# Patient Record
Sex: FEMALE | Race: OTHER | Employment: UNEMPLOYED | URBAN - METROPOLITAN AREA
[De-identification: names, ages, dates, MRNs, and addresses within clinical notes are randomized per-mention and may not be internally consistent; named-entity substitution may affect disease eponyms.]

---

## 2018-10-10 ENCOUNTER — HOSPITAL ENCOUNTER (EMERGENCY)
Age: 33
Discharge: HOME | End: 2018-10-11
Attending: EMERGENCY MEDICINE | Admitting: EMERGENCY MEDICINE
Payer: SELF-PAY

## 2018-10-10 ENCOUNTER — APPOINTMENT (OUTPATIENT)
Dept: ULTRASOUND IMAGING | Age: 33
End: 2018-10-10
Attending: NURSE PRACTITIONER
Payer: SELF-PAY

## 2018-10-10 DIAGNOSIS — Z32.01 PREGNANCY TEST PERFORMED, PREGNANCY CONFIRMED: Primary | ICD-10-CM

## 2018-10-10 DIAGNOSIS — R10.84 ABDOMINAL PAIN, GENERALIZED: ICD-10-CM

## 2018-10-10 LAB
ALBUMIN SERPL-MCNC: 3.4 G/DL (ref 3.5–5)
ALBUMIN/GLOB SERPL: 0.9 {RATIO} (ref 1.1–2.2)
ALP SERPL-CCNC: 61 U/L (ref 45–117)
ALT SERPL-CCNC: 19 U/L (ref 12–78)
ANION GAP SERPL CALC-SCNC: 10 MMOL/L (ref 5–15)
APPEARANCE UR: CLEAR
AST SERPL-CCNC: 7 U/L (ref 15–37)
BACTERIA URNS QL MICRO: NEGATIVE /HPF
BASOPHILS # BLD: 0 K/UL (ref 0–0.1)
BASOPHILS NFR BLD: 0 % (ref 0–1)
BILIRUB SERPL-MCNC: 0.3 MG/DL (ref 0.2–1)
BILIRUB UR QL: NEGATIVE
BUN SERPL-MCNC: 10 MG/DL (ref 6–20)
BUN/CREAT SERPL: 20 (ref 12–20)
CALCIUM SERPL-MCNC: 9.4 MG/DL (ref 8.5–10.1)
CHLORIDE SERPL-SCNC: 104 MMOL/L (ref 97–108)
CO2 SERPL-SCNC: 23 MMOL/L (ref 21–32)
COLOR UR: ABNORMAL
CREAT SERPL-MCNC: 0.49 MG/DL (ref 0.55–1.02)
DIFFERENTIAL METHOD BLD: ABNORMAL
EOSINOPHIL # BLD: 0 K/UL (ref 0–0.4)
EOSINOPHIL NFR BLD: 1 % (ref 0–7)
EPITH CASTS URNS QL MICRO: ABNORMAL /LPF
ERYTHROCYTE [DISTWIDTH] IN BLOOD BY AUTOMATED COUNT: 13.3 % (ref 11.5–14.5)
GLOBULIN SER CALC-MCNC: 3.9 G/DL (ref 2–4)
GLUCOSE SERPL-MCNC: 117 MG/DL (ref 65–100)
GLUCOSE UR STRIP.AUTO-MCNC: >1000 MG/DL
HCG SERPL-ACNC: ABNORMAL MIU/ML (ref 0–6)
HCT VFR BLD AUTO: 34.8 % (ref 35–47)
HGB BLD-MCNC: 11.7 G/DL (ref 11.5–16)
HGB UR QL STRIP: ABNORMAL
HYALINE CASTS URNS QL MICRO: ABNORMAL /LPF (ref 0–5)
IMM GRANULOCYTES # BLD: 0 K/UL (ref 0–0.04)
IMM GRANULOCYTES NFR BLD AUTO: 0 % (ref 0–0.5)
KETONES UR QL STRIP.AUTO: 15 MG/DL
LEUKOCYTE ESTERASE UR QL STRIP.AUTO: NEGATIVE
LIPASE SERPL-CCNC: 150 U/L (ref 73–393)
LYMPHOCYTES # BLD: 1.6 K/UL (ref 0.8–3.5)
LYMPHOCYTES NFR BLD: 25 % (ref 12–49)
MCH RBC QN AUTO: 27.1 PG (ref 26–34)
MCHC RBC AUTO-ENTMCNC: 33.6 G/DL (ref 30–36.5)
MCV RBC AUTO: 80.6 FL (ref 80–99)
MONOCYTES # BLD: 0.3 K/UL (ref 0–1)
MONOCYTES NFR BLD: 5 % (ref 5–13)
NEUTS SEG # BLD: 4.5 K/UL (ref 1.8–8)
NEUTS SEG NFR BLD: 69 % (ref 32–75)
NITRITE UR QL STRIP.AUTO: NEGATIVE
NRBC # BLD: 0 K/UL (ref 0–0.01)
NRBC BLD-RTO: 0 PER 100 WBC
PH UR STRIP: 5.5 [PH] (ref 5–8)
PLATELET # BLD AUTO: 259 K/UL (ref 150–400)
PMV BLD AUTO: 10.1 FL (ref 8.9–12.9)
POTASSIUM SERPL-SCNC: 3.6 MMOL/L (ref 3.5–5.1)
PROT SERPL-MCNC: 7.3 G/DL (ref 6.4–8.2)
PROT UR STRIP-MCNC: ABNORMAL MG/DL
RBC # BLD AUTO: 4.32 M/UL (ref 3.8–5.2)
RBC #/AREA URNS HPF: ABNORMAL /HPF (ref 0–5)
SODIUM SERPL-SCNC: 137 MMOL/L (ref 136–145)
SP GR UR REFRACTOMETRY: >1.03 (ref 1–1.03)
UROBILINOGEN UR QL STRIP.AUTO: 1 EU/DL (ref 0.2–1)
WBC # BLD AUTO: 6.4 K/UL (ref 3.6–11)
WBC URNS QL MICRO: ABNORMAL /HPF (ref 0–4)

## 2018-10-10 PROCEDURE — 86900 BLOOD TYPING SEROLOGIC ABO: CPT | Performed by: NURSE PRACTITIONER

## 2018-10-10 PROCEDURE — 81001 URINALYSIS AUTO W/SCOPE: CPT | Performed by: NURSE PRACTITIONER

## 2018-10-10 PROCEDURE — 80053 COMPREHEN METABOLIC PANEL: CPT | Performed by: NURSE PRACTITIONER

## 2018-10-10 PROCEDURE — 84702 CHORIONIC GONADOTROPIN TEST: CPT | Performed by: NURSE PRACTITIONER

## 2018-10-10 PROCEDURE — 83690 ASSAY OF LIPASE: CPT | Performed by: NURSE PRACTITIONER

## 2018-10-10 PROCEDURE — 99283 EMERGENCY DEPT VISIT LOW MDM: CPT

## 2018-10-10 PROCEDURE — 76705 ECHO EXAM OF ABDOMEN: CPT

## 2018-10-10 PROCEDURE — 85025 COMPLETE CBC W/AUTO DIFF WBC: CPT | Performed by: NURSE PRACTITIONER

## 2018-10-10 PROCEDURE — 36415 COLL VENOUS BLD VENIPUNCTURE: CPT | Performed by: NURSE PRACTITIONER

## 2018-10-11 ENCOUNTER — APPOINTMENT (OUTPATIENT)
Dept: ULTRASOUND IMAGING | Age: 33
End: 2018-10-11
Attending: NURSE PRACTITIONER
Payer: SELF-PAY

## 2018-10-11 VITALS
DIASTOLIC BLOOD PRESSURE: 80 MMHG | HEART RATE: 94 BPM | BODY MASS INDEX: 30.73 KG/M2 | SYSTOLIC BLOOD PRESSURE: 139 MMHG | OXYGEN SATURATION: 98 % | WEIGHT: 180 LBS | RESPIRATION RATE: 17 BRPM | TEMPERATURE: 98.5 F | HEIGHT: 64 IN

## 2018-10-11 LAB
ABO + RH BLD: NORMAL
BLOOD BANK CMNT PATIENT-IMP: NORMAL

## 2018-10-11 PROCEDURE — 76801 OB US < 14 WKS SINGLE FETUS: CPT

## 2018-10-11 NOTE — ED TRIAGE NOTES
Pt.  is 16 week pregnant,  A0 pt.  has been vomiting non-stop, having right flank pain and abdominal cramping. Denies bleeding or discharge at this time. +fetal movement. Pt.  has not had any pre- care at this time, nor has been able to hold down her vitamins.  does not have a doctor.

## 2018-10-11 NOTE — ED PROVIDER NOTES
HPI Comments: 26 y.o. Female, Lina Vicente and currently 16 weeks pregnant with no significant past medical history, presents ambulatory to the ED accompanied by her , with chief complaint of right flank pain and abdominal pain x 2 days. Patient states that her abdominal pain and right flank pain started yesterday. The abdominal pain is described as diffuse \"cramps\", and she rates her pain in both the abdomen and right flank as a 6/10 in severity. Her pain has been constant since onset. She also reports associated \"burning\" dysuria, occasionally malodorous urine, chills, nausea, and vomiting. Patient notes that her nausea and vomiting have been present since she first found out she was pregnant. She denies fever. Pt reports that she has not had any prenatal care for this current pregnancy due to lack of medical insurance. Now that she has insurance, she plans to make an appointment in the near future. Patient specifically denies any vaginal bleeding or vaginal discharge. There are no other acute medical concerns at this time. PCP: None No past medical history on file. No past surgical history on file. Note written by Elab Knutson. Denita Escamilla, as dictated  by Jose Montes NP 9:42 The history is provided by the patient. No  was used. No past medical history on file. No past surgical history on file. No family history on file. Social History Social History  Marital status:  Spouse name: N/A  
 Number of children: N/A  
 Years of education: N/A Occupational History  Not on file. Social History Main Topics  Smoking status: Not on file  Smokeless tobacco: Not on file  Alcohol use Not on file  Drug use: Not on file  Sexual activity: Not on file Other Topics Concern  Not on file Social History Narrative  No narrative on file ALLERGIES: Review of patient's allergies indicates no known allergies. Review of Systems Constitutional: Positive for chills. Negative for appetite change, diaphoresis, fatigue and fever. HENT: Negative for congestion, ear discharge, ear pain, sinus pain, sinus pressure, sore throat and trouble swallowing. Eyes: Negative for photophobia, pain, redness and visual disturbance. Respiratory: Negative for chest tightness, shortness of breath and wheezing. Cardiovascular: Negative for chest pain and palpitations. Gastrointestinal: Positive for abdominal pain, nausea and vomiting. Negative for abdominal distention and diarrhea. Endocrine: Negative. Genitourinary: Positive for dysuria and flank pain (Right). Negative for difficulty urinating, frequency, hematuria, urgency, vaginal bleeding and vaginal discharge. Musculoskeletal: Negative for back pain, neck pain and neck stiffness. Skin: Negative for color change, pallor, rash and wound. Allergic/Immunologic: Negative. Neurological: Negative for dizziness, speech difficulty, weakness and headaches. Hematological: Does not bruise/bleed easily. Psychiatric/Behavioral: Negative for behavioral problems. The patient is not nervous/anxious. All other systems reviewed and are negative. Vitals:  
 10/10/18 2105 10/11/18 0044 10/11/18 0045 BP: 150/80 139/80 Pulse: 94 Resp: 17 Temp: 98.5 °F (36.9 °C) SpO2: 99%  98% Weight: 81.6 kg (180 lb) Height: 5' 4\" (1.626 m) Physical Exam  
Constitutional: She is oriented to person, place, and time. She appears well-developed and well-nourished. No distress. HENT:  
Head: Normocephalic and atraumatic.   
Right Ear: External ear normal.  
Left Ear: External ear normal.  
Nose: Nose normal.  
Mouth/Throat: Oropharynx is clear and moist.  
Eyes: Conjunctivae and EOM are normal. Pupils are equal, round, and reactive to light. Right eye exhibits no discharge. Left eye exhibits no discharge. No scleral icterus. Neck: Normal range of motion. Neck supple. No JVD present. No tracheal deviation present. No thyromegaly present. Cardiovascular: Normal rate, regular rhythm, normal heart sounds and intact distal pulses. Exam reveals no gallop. No murmur heard. Pulmonary/Chest: Effort normal and breath sounds normal. No respiratory distress. She has no wheezes. She has no rales. She exhibits no tenderness. Abdominal: Soft. Bowel sounds are normal. She exhibits no distension. There is no tenderness. There is no rebound and no guarding. Gravid uterus Genitourinary: No vaginal discharge found. Musculoskeletal: Normal range of motion. She exhibits no edema or tenderness. R lower back ttp Neurological: She is alert and oriented to person, place, and time. Skin: Skin is warm and dry. No rash noted. She is not diaphoretic. No erythema. No pallor. Psychiatric: She has a normal mood and affect. Her behavior is normal. Judgment and thought content normal.  
Nursing note and vitals reviewed. Written by Denita Max, as dictated  by Jose Montes NP 9:42 PM 
 
MDM Number of Diagnoses or Management Options Abdominal pain, generalized: new and requires workup Pregnancy test performed, pregnancy confirmed: new and requires workup Diagnosis management comments: Plan: 
Discharge to home and follow up with PCP. Follow up with OB/GYN Amount and/or Complexity of Data Reviewed Clinical lab tests: ordered and reviewed ED Course 12:42 AM 
Pt has been reexamined. Pt has no new complaints, changes or physical findings. Care plan outlined and precautions discussed. All available results were reviewed with pt. All medications were reviewed with pt. All of pt's questions and concerns were addressed.  Pt agrees to F/U as instructed and agrees to return to ED upon further deterioration. Pt is ready to go home. Yandy Lynch NP Procedures

## 2018-10-11 NOTE — DISCHARGE INSTRUCTIONS
Belly Pain in Pregnancy: Care Instructions  Your Care Instructions  When you're pregnant, any belly pain can be a worry. You may not want to call your doctor about every pain you have. But you don't want to miss something that is dangerous for you or your baby. Even if it feels familiar, belly pain can mean something new when you're pregnant. It's important to know when to call your doctor. It will also help to know how to care for yourself at home when your pain is not caused by anything harmful. · When belly pain is more severe or constant, see a doctor right away. · If you're sure your belly pain is a sign of labor, call your doctor. · When belly pain is brief, it's usually a normal part of pregnancy. It might be related to changes in the growing uterus. Or it could be the stretching of ligaments called round ligaments. These ligaments help support the uterus. Round ligament pain can be on either side of your belly. It can also be felt in your hips or groin. Follow-up care is a key part of your treatment and safety. Be sure to make and go to all appointments, and call your doctor if you are having problems. It's also a good idea to know your test results and keep a list of the medicines you take. How can you tell if belly pain is a sign of labor? When belly pain is caused by labor, it can feel like mild or menstrual-like cramps in your lower belly. These cramps are probably contractions. They can happen in your second or third trimester. You may also have:  · A steady, dull ache in your lower back, pelvis, or thighs. · A feeling of pressure in your pelvis or lower belly. · Changes in your vaginal discharge or a sudden release of fluid from the vagina. If you think you are in labor, call your doctor. How can you care for yourself at home? When belly pain is mild and is not a symptom of labor:  · Rest until you feel better. · Take a warm bath.   · Think about what you drink and eat:  ¨ Drink plenty of fluids. Choose water and other caffeine-free clear liquids until you feel better. ¨ Try eating small, frequent meals. If your stomach is upset, try bland, low-fat foods like plain rice, broiled chicken, toast, and yogurt. · Think about how you move if you are having brief pains from stretching of the round ligaments. ¨ Try gentle stretching. ¨ Move a little more slowly when turning in bed or getting up from a chair, so those ligaments don't stretch quickly. ¨ Lean forward a bit if you think you are going to cough or sneeze. When should you call for help? Call 911 anytime you think you may need emergency care. For example, call if:  · You have sudden, severe pain in your belly. · You have severe vaginal bleeding. Call your doctor now or seek immediate medical care if:  · You have new or worse belly pain or cramping. · You have any vaginal bleeding. · You have a fever. · You have symptoms of preeclampsia, such as:  ¨ Sudden swelling of your face, hands, or feet. ¨ New vision problems (such as dimness or blurring). ¨ A severe headache. · You think that you may be in labor. This means that you've had at least 8 contractions within 1 hour or at least 4 contractions within 20 minutes, even after you change your position and drink fluids. · You have symptoms of a urinary tract infection. These may include:  ¨ Pain or burning when you urinate. ¨ A frequent need to urinate without being able to pass much urine. ¨ Pain in the flank, which is just below the rib cage and above the waist on either side of the back. ¨ Blood in your urine. Watch closely for changes in your health, and be sure to contact your doctor if you are worried about your or your baby's health. Where can you learn more? Go to Workspot.be  Enter B275 in the search box to learn more about \"Belly Pain in Pregnancy: Care Instructions. \"   © 7520-8623 [employer] Inc[employer].  Care instructions adapted under license by [facility] (which disclaims liability or warranty for this information). This care instruction is for use with your licensed healthcare professional. If you have questions about a medical condition or this instruction, always ask your healthcare professional. [address] any warranty or liability for your use of this information.   Content Version: 85.8.518776; Current as of: November 12, 2015

## 2019-05-13 ENCOUNTER — HOSPITAL ENCOUNTER (EMERGENCY)
Age: 34
Discharge: HOME | End: 2019-05-13
Attending: STUDENT IN AN ORGANIZED HEALTH CARE EDUCATION/TRAINING PROGRAM
Payer: MEDICAID

## 2019-05-13 ENCOUNTER — APPOINTMENT (OUTPATIENT)
Dept: GENERAL RADIOLOGY | Age: 34
End: 2019-05-13
Attending: STUDENT IN AN ORGANIZED HEALTH CARE EDUCATION/TRAINING PROGRAM
Payer: MEDICAID

## 2019-05-13 VITALS
WEIGHT: 178 LBS | DIASTOLIC BLOOD PRESSURE: 83 MMHG | TEMPERATURE: 98.6 F | OXYGEN SATURATION: 100 % | RESPIRATION RATE: 18 BRPM | SYSTOLIC BLOOD PRESSURE: 184 MMHG | BODY MASS INDEX: 30.39 KG/M2 | HEIGHT: 64 IN | HEART RATE: 65 BPM

## 2019-05-13 DIAGNOSIS — J06.9 UPPER RESPIRATORY TRACT INFECTION, UNSPECIFIED TYPE: Primary | ICD-10-CM

## 2019-05-13 PROCEDURE — 71046 X-RAY EXAM CHEST 2 VIEWS: CPT

## 2019-05-13 PROCEDURE — 99281 EMR DPT VST MAYX REQ PHY/QHP: CPT

## 2019-05-13 NOTE — DISCHARGE INSTRUCTIONS
Patient Education        Upper Respiratory Infection (Cold): Care Instructions  Your Care Instructions    An upper respiratory infection, or URI, is an infection of the nose, sinuses, or throat. URIs are spread by coughs, sneezes, and direct contact. The common cold is the most frequent kind of URI. The flu and sinus infections are other kinds of URIs. Almost all URIs are caused by viruses. Antibiotics won't cure them. But you can treat most infections with home care. This may include drinking lots of fluids and taking over-the-counter pain medicine. You will probably feel better in 4 to 10 days. The doctor has checked you carefully, but problems can develop later. If you notice any problems or new symptoms, get medical treatment right away. Follow-up care is a key part of your treatment and safety. Be sure to make and go to all appointments, and call your doctor if you are having problems. It's also a good idea to know your test results and keep a list of the medicines you take. How can you care for yourself at home? · To prevent dehydration, drink plenty of fluids, enough so that your urine is light yellow or clear like water. Choose water and other caffeine-free clear liquids until you feel better. If you have kidney, heart, or liver disease and have to limit fluids, talk with your doctor before you increase the amount of fluids you drink. · Take an over-the-counter pain medicine, such as acetaminophen (Tylenol), ibuprofen (Advil, Motrin), or naproxen (Aleve). Read and follow all instructions on the label. · Before you use cough and cold medicines, check the label. These medicines may not be safe for young children or for people with certain health problems. · Be careful when taking over-the-counter cold or flu medicines and Tylenol at the same time. Many of these medicines have acetaminophen, which is Tylenol. Read the labels to make sure that you are not taking more than the recommended dose.  Too much acetaminophen (Tylenol) can be harmful. · Get plenty of rest.  · Do not smoke or allow others to smoke around you. If you need help quitting, talk to your doctor about stop-smoking programs and medicines. These can increase your chances of quitting for good. When should you call for help? Call 911 anytime you think you may need emergency care. For example, call if:    · You have severe trouble breathing.    Call your doctor now or seek immediate medical care if:    · You seem to be getting much sicker.     · You have new or worse trouble breathing.     · You have a new or higher fever.     · You have a new rash.    Watch closely for changes in your health, and be sure to contact your doctor if:    · You have a new symptom, such as a sore throat, an earache, or sinus pain.     · You cough more deeply or more often, especially if you notice more mucus or a change in the color of your mucus.     · You do not get better as expected. Where can you learn more? Go to http://miky-divya.info/. Enter V275 in the search box to learn more about \"Upper Respiratory Infection (Cold): Care Instructions. \"  Current as of: September 5, 2018  Content Version: 11.9  © 9942-7025 [employer]. Care instructions adapted under license by [employer] (which disclaims liability or warranty for this information). If you have questions about a medical condition or this instruction, always ask your healthcare professional. [place] [address] any warranty or liability for your use of this information.

## 2019-05-13 NOTE — ED PROVIDER NOTES
Rosaline Grady is a 37 y.o. female who presents ambulatory to the ED with a c/o cough, sore throat onset yesterday morning. Pt currently rates her pain at 10/10 in severity. Pt additionally c/o chest pain secondary to her cough. Pt specifically denies shortness of breath, n/v,d , fever, chills, numbness, tingling, abdominal pain, back pain, leg swelling, dizziness or any other acute sx. Pt notes that her oldest son has been sick recently but denies any other sick contacts. Pt has not taken any medications for her sx. PCP: None PMHx significant for: none reported PSHx significant for: none reported Social Hx: Tobacco: none EtOH: none Illicit drug use: none There are no further complaints or symptoms at this time. Signed by: luis Blackburn for Wanda Nicole MD on May 13th, 2019 at 4:05 PM. The history is provided by the patient. No  was used. No past medical history on file. No past surgical history on file. No family history on file. Social History Socioeconomic History  Marital status:  Spouse name: Not on file  Number of children: Not on file  Years of education: Not on file  Highest education level: Not on file Occupational History  Not on file Social Needs  Financial resource strain: Not on file  Food insecurity:  
  Worry: Not on file Inability: Not on file  Transportation needs:  
  Medical: Not on file Non-medical: Not on file Tobacco Use  Smoking status: Not on file Substance and Sexual Activity  Alcohol use: Not on file  Drug use: Not on file  Sexual activity: Not on file Lifestyle  Physical activity:  
  Days per week: Not on file Minutes per session: Not on file  Stress: Not on file Relationships  Social connections:  
  Talks on phone: Not on file Gets together: Not on file Attends Restoration service: Not on file Active member of club or organization: Not on file Attends meetings of clubs or organizations: Not on file Relationship status: Not on file  Intimate partner violence:  
  Fear of current or ex partner: Not on file Emotionally abused: Not on file Physically abused: Not on file Forced sexual activity: Not on file Other Topics Concern  Not on file Social History Narrative  Not on file ALLERGIES: Patient has no known allergies. Review of Systems Constitutional: Negative for chills and fever. HENT: Positive for sore throat. Respiratory: Positive for cough. Negative for shortness of breath. Cardiovascular: Positive for chest pain. Gastrointestinal: Negative for abdominal pain, nausea and vomiting. Genitourinary: Negative for dysuria. Neurological: Negative for dizziness and headaches. All other systems reviewed and are negative. There were no vitals filed for this visit. Physical Exam  
Constitutional: She is oriented to person, place, and time. She appears well-developed and well-nourished. No distress. HENT:  
Head: Normocephalic and atraumatic. Nose: Nose normal.  
Mouth/Throat: Oropharynx is clear and moist. No oropharyngeal exudate. Eyes: Conjunctivae and EOM are normal. Right eye exhibits no discharge. Left eye exhibits no discharge. No scleral icterus. Neck: Normal range of motion. Neck supple. No JVD present. No tracheal deviation present. No thyromegaly present. Cardiovascular: Normal rate, regular rhythm, normal heart sounds and intact distal pulses. Exam reveals no gallop and no friction rub. No murmur heard. Pulmonary/Chest: Effort normal and breath sounds normal. No stridor. No respiratory distress. She has no wheezes. She has no rales. She exhibits no tenderness. Non productive cough Abdominal: Bowel sounds are normal. She exhibits no distension and no mass. There is no tenderness. There is no rebound. Musculoskeletal: Normal range of motion. She exhibits no edema or tenderness. Lymphadenopathy:  
  She has no cervical adenopathy. Neurological: She is alert and oriented to person, place, and time. No cranial nerve deficit. Coordination normal.  
Skin: Skin is warm and dry. No rash noted. She is not diaphoretic. No erythema. No pallor. Psychiatric: She has a normal mood and affect. Her behavior is normal. Judgment and thought content normal.  
Nursing note and vitals reviewed. MDM Number of Diagnoses or Management Options Upper respiratory tract infection, unspecified type:  
  
Amount and/or Complexity of Data Reviewed Tests in the radiology section of CPT®: ordered and reviewed Independent visualization of images, tracings, or specimens: yes Risk of Complications, Morbidity, and/or Mortality Presenting problems: moderate Diagnostic procedures: moderate Management options: moderate Patient Progress Patient progress: stable Procedures

## 2020-11-15 NOTE — PATIENT INSTRUCTIONS
Learning About Pregnancy  Your Care Instructions     Your health in the early weeks of your pregnancy is particularly important for your baby's health. Take good care of yourself. Anything you do that harms your body can also harm your baby. Make sure to go to all of your doctor appointments. Regular checkups will help keep you and your baby healthy. How can you care for yourself at home? Diet    · Eat a balanced diet. Make sure your diet includes plenty of beans, peas, and leafy green vegetables.     · Do not skip meals or go for many hours without eating. If you are nauseated, try to eat a small, healthy snack every 2 to 3 hours.     · Do not eat fish that has a high level of mercury, such as shark, swordfish, or mackerel. Do not eat more than one can of tuna each week.     · Drink plenty of fluids, enough so that your urine is light yellow or clear like water. If you have kidney, heart, or liver disease and have to limit fluids, talk with your doctor before you increase the amount of fluids you drink.     · Cut down on caffeine, such as coffee, tea, and cola.     · Do not drink alcohol, such as beer, wine, or hard liquor.     · Take a multivitamin that contains at least 400 micrograms (mcg) of folic acid to help prevent birth defects. Fortified cereal and whole wheat bread are good additional sources of folic acid.     · Increase the calcium in your diet. Try to drink a quart of skim milk each day. You may also take calcium supplements and choose foods such as cheese and yogurt. Lifestyle    · Make sure you go to your follow-up appointments.     · Get plenty of rest. You may be unusually tired while you are pregnant.     · Get at least 30 minutes of exercise on most days of the week. Walking is a good choice. If you have not exercised in the past, start out slowly. Take several short walks each day.     · Do not smoke. If you need help quitting, talk to your doctor about stop-smoking programs.  These can increase your chances of quitting for good.     · Do not touch cat feces or litter boxes. Also, wash your hands after you handle raw meat, and fully cook all meat before you eat it. Wear gloves when you work in the yard or garden, and wash your hands well when you are done. Cat feces, raw or undercooked meat, and contaminated dirt can cause an infection that may harm your baby or lead to a miscarriage.     · Do not use saunas or hot tubs. Raising your body temperature may harm your baby.     · Avoid chemical fumes, paint fumes, or poisons.     · Do not use illegal drugs or alcohol. Medicines    · Review all of your medicines with your doctor. Some of your routine medicines may need to be changed to protect your baby.     · Use acetaminophen (Tylenol) to relieve minor problems, such as a mild headache or backache or a mild fever with cold symptoms. Do not use nonsteroidal anti-inflammatory drugs (NSAIDs), such as ibuprofen (Advil, Motrin) or naproxen (Aleve), unless your doctor says it is okay.     · Do not take two or more pain medicines at the same time unless the doctor told you to. Many pain medicines have acetaminophen, which is Tylenol. Too much acetaminophen (Tylenol) can be harmful.     · Take your medicines exactly as prescribed. Call your doctor if you think you are having a problem with your medicine. To manage morning sickness    · If you feel sick when you first wake up, try eating a small snack (such as crackers) before you get out of bed. Allow some time to digest the snack, and then get out of bed slowly.     · Do not skip meals or go for long periods without eating. An empty stomach can make nausea worse.     · Eat small, frequent meals instead of three large meals each day.     · Drink plenty of fluids.  Sports drinks, such as Gatorade or Powerade, are good choices.     · Eat foods that are high in protein but low in fat.     · If you are taking iron supplements, ask your doctor if they are necessary. Iron can make nausea worse.     · Avoid any smells, such as coffee, that make you feel sick.     · Get lots of rest. Morning sickness may be worse when you are tired. Follow-up care is a key part of your treatment and safety. Be sure to make and go to all appointments, and call your doctor if you are having problems. It's also a good idea to know your test results and keep a list of the medicines you take. Where can you learn more? Go to http://www.gray.com/  Enter R340 in the search box to learn more about \"Learning About Pregnancy. \"  Current as of: February 11, 2020               Content Version: 12.6  © 9200-9303 "[employer] Incorporated. Care instructions adapted under license by [employer] (which disclaims liability or warranty for this information). If you have questions about a medical condition or this instruction, always ask your healthcare professional. [address] any warranty or liability for your use of this information.

## 2020-11-15 NOTE — PROGRESS NOTES
Threatened AB note    Rosaline Missed is a ,  38 y.o. female OTHER whose Patient's last menstrual period was 2020 (exact date). was on 2020. making her 10 weeks pregnant , who presents for initial pregnancy visit. Recent Tests:  She had a positive No components found for: SPEP, UPEP  pregnancy test on (DATE) . She has had a recent pelvic ultrasound today. She has not had prenatal care. Sheis not having pelvic pain. She does not have a history of a spontaneous . Her past medical history is significant for  has no past medical history on file. .   She has not had a recent injury or trauma. History reviewed. No pertinent past medical history. History reviewed. No pertinent surgical history.   Social History     Occupational History    Not on file   Tobacco Use    Smoking status: Never Smoker    Smokeless tobacco: Never Used   Substance and Sexual Activity    Alcohol use: Never     Frequency: Never    Drug use: Never    Sexual activity: Yes     Partners: Male     Family History   Problem Relation Age of Onset    Hypertension Mother        No Known Allergies  Prior to Admission medications    Not on File        Review of Systems: History obtained from the patient  Constitutional: negative for weight loss, fever, night sweats  Breast: negative for breast lumps, nipple discharge, galactorrhea  GI: negative for change in bowel habits, abdominal pain, black or bloody stools  : negative for frequency, dysuria, hematuria, vaginal discharge  MSK: negative for back pain, joint pain, muscle pain  Skin: negative for itching, rash, hives  Psych: negative for anxiety, depression, change in mood      Objective:  Visit Vitals  /64   Wt 193 lb (87.5 kg)   LMP 2020 (Exact Date)   BMI 33.13 kg/m²       Physical Exam:   PHYSICAL EXAMINATION    Constitutional  · Appearance: well-nourished, well developed, alert, in no acute distress    Gastrointestinal  · Abdominal Examination: abdomen non-tender to palpation, normal bowel sounds, no masses present  · Liver and spleen: no hepatomegaly present, spleen not palpable  · Hernias: no hernias identified    Genitourinary  · External Genitalia: normal appearance for age, no discharge present, no tenderness present, no inflammatory lesions present, no masses present, no atrophy present  · Vagina: normal vaginal vault without central or paravaginal defects, bloody discharge present, no inflammatory lesions present, no masses present  · Bladder: non-tender to palpation  · Urethra: appears normal  · Cervix: normal   · Uterus: enlarged, soft, mildly tender with normal shape and consistency  · Adnexa: no adnexal tenderness present, no adnexal masses present  · Perineum: perineum within normal limits, no evidence of trauma, no rashes or skin lesions present  · Anus: anus within normal limits, no hemorrhoids present  · Inguinal Lymph Nodes: no lymphadenopathy present    Skin  · General Inspection: no rash, no lesions identified    Neurologic/Psychiatric  · Mental Status:  · Orientation: grossly oriented to person, place and time  · Mood and Affect: mood normal, affect appropriate    Assessment/Plan:   Threatened AB- likely normal pregnancy, however as near cornua possible ectopic. Will rto for repeat ultrasound in 1 week. Routine precautions discussed.

## 2020-11-16 ENCOUNTER — OFFICE VISIT (OUTPATIENT)
Dept: OBGYN CLINIC | Age: 35
End: 2020-11-16
Payer: MEDICAID

## 2020-11-16 VITALS — BODY MASS INDEX: 33.13 KG/M2 | WEIGHT: 193 LBS | DIASTOLIC BLOOD PRESSURE: 64 MMHG | SYSTOLIC BLOOD PRESSURE: 110 MMHG

## 2020-11-16 DIAGNOSIS — Z23 ENCOUNTER FOR IMMUNIZATION: ICD-10-CM

## 2020-11-16 DIAGNOSIS — O20.0 THREATENED ABORTION: ICD-10-CM

## 2020-11-16 DIAGNOSIS — Z32.01 PREGNANCY EXAMINATION OR TEST, POSITIVE RESULT: Primary | ICD-10-CM

## 2020-11-16 PROCEDURE — 76801 OB US < 14 WKS SINGLE FETUS: CPT | Performed by: OBSTETRICS & GYNECOLOGY

## 2020-11-16 PROCEDURE — 99202 OFFICE O/P NEW SF 15 MIN: CPT | Performed by: OBSTETRICS & GYNECOLOGY

## 2020-11-16 RX ORDER — DOXYLAMINE SUCCINATE AND PYRIDOXINE HYDROCHLORIDE, DELAYED RELEASE TABLETS 10 MG/10 MG 10; 10 MG/1; MG/1
TABLET, DELAYED RELEASE ORAL
Qty: 120 TAB | Refills: 11 | Status: SHIPPED | OUTPATIENT
Start: 2020-11-16 | End: 2022-10-31

## 2020-11-16 RX ORDER — ONDANSETRON 8 MG/1
8 TABLET, ORALLY DISINTEGRATING ORAL
Qty: 30 TAB | Refills: 11 | Status: SHIPPED | OUTPATIENT
Start: 2020-11-16 | End: 2021-07-23

## 2020-11-16 RX ORDER — PNV 112/IRON/FOLIC/OM3/DHA/EPA 3.33-.33MG
1 TABLET,CHEWABLE ORAL DAILY
Qty: 90 TAB | Refills: 4 | Status: SHIPPED | OUTPATIENT
Start: 2020-11-16

## 2020-11-22 LAB
C TRACH RRNA CVX QL NAA+PROBE: NEGATIVE
CYTOLOGIST CVX/VAG CYTO: NORMAL
CYTOLOGY CVX/VAG DOC CYTO: NORMAL
CYTOLOGY CVX/VAG DOC THIN PREP: NORMAL
CYTOLOGY HISTORY:: NORMAL
DX ICD CODE: NORMAL
HPV I/H RISK 1 DNA CVX QL PROBE+SIG AMP: NORMAL
HPV I/H RISK 4 DNA CVX QL PROBE+SIG AMP: NEGATIVE
Lab: NORMAL
N GONORRHOEA RRNA CVX QL NAA+PROBE: NEGATIVE
OTHER STN SPEC: NORMAL
STAT OF ADQ CVX/VAG CYTO-IMP: NORMAL
T VAGINALIS RRNA SPEC QL NAA+PROBE: NEGATIVE

## 2020-11-24 ENCOUNTER — ROUTINE PRENATAL (OUTPATIENT)
Dept: OBGYN CLINIC | Age: 35
End: 2020-11-24
Payer: MEDICAID

## 2020-11-24 DIAGNOSIS — Z32.01 PREGNANCY EXAMINATION OR TEST, POSITIVE RESULT: Primary | ICD-10-CM

## 2020-11-24 DIAGNOSIS — Z34.80 SUPERVISION OF OTHER NORMAL PREGNANCY: ICD-10-CM

## 2020-11-24 PROCEDURE — 0500F INITIAL PRENATAL CARE VISIT: CPT | Performed by: OBSTETRICS & GYNECOLOGY

## 2020-11-24 PROCEDURE — 76801 OB US < 14 WKS SINGLE FETUS: CPT | Performed by: OBSTETRICS & GYNECOLOGY

## 2020-11-24 NOTE — PROGRESS NOTES
Current pregnancy history:    Rosaline Grady is a ,  38 y.o. female OTHER Patient's last menstrual period was 2020 (exact date). .  She presents for the evaluation of amenorrhea and a positive pregnancy test.    LMP history:  The date of her LMP is certain. Her last menstrual period was normal and lasted for 4 to 5 days. A urine pregnancy test was positive a few weeks ago. She was not on the pill at conception. Based on her LMP, her EDC is 21 and her EGA is 9 weeks,5 days. Her menstrual cycles are regular and occur approximately every 28 days  and range from 3 to 5 days. The last menses lasted the usual number of days. Ultrasound data:  She had an  ultrasound done by the ultrasound tech today which revealed a viable marin pregnancy with a gestational age of 9 weeks and 1 days giving an [facility] of 21. Pregnancy symptoms:    Since her LMP she has experienced  urinary frequency, breast tenderness, and nausea. She has been vomiting over the last few weeks. Associated signs and symptoms which she denies: dysuria, discharge, vaginal bleeding. Relevant past pregnancy history:   She has the following pregnancy history: Her last pregnancy she had GDM and elevated blood pressure. Relevant past medical history:(relevant to this pregnancy): noncontributory. Substance history: negative for alcohol, tobacco and street drugs. Positive for nothing. Exposure history: There is/are no indoor cat/s in the home. The patient was instructed to not change the cat litter. She admits close contact with children on a regular basis. She has had chicken pox or the vaccine in the past.   Patient denies issues with domestic violence. Genetic Screening/Teratology Counseling: (Includes patient, baby's father, or anyone in either family with:)  3.  Patient's age >/= 28 at [facility]?-- no  .   2.   Thalassemia ([place], [place], [address], or  background): MCV<80?--no.     3. Neural tube defect (meningomyelocele, spina bifida, anencephaly)?--no.   4.  Congenital heart defect?--no.  5.  Down syndrome?--no.   6.  Jatinder-Sachs ([place], [place])?--no.   7.  Canavan's Disease?--no.   8.  Familial Dysautonomia?--no.   9.  Sickle cell disease or trait ()? --no   The patient has not been tested for sickle trait  10. Hemophilia or other blood disorders?--no. 11.  Muscular dystrophy?--no. 12.  Cystic fibrosis?--no. 13.  Dilcia's Chorea?--no. 14.  Mental retardation/autism (if yes was person tested for Fragile X)?--no. 15.  Other inherited genetic or chromosomal disorder?--no. 12.  Maternal metabolic disorder (DM, PKU, etc)?--no. 17.  Patient or FOB with a child with a birth defect not listed above?--no.  17a. Patient or FOB with a birth defect themselves?--no. 18.  Recurrent pregnancy loss, or stillbirth?--no. 19.  Any medications since LMP other than prenatal vitamins (include vitamins, supplements, OTC meds, drugs, alcohol)?--no. 20.  Any other genetic/environmental exposure to discuss?--no. Infection History:  1. Lives with someone with TB or TB exposed?--no.   2.  Patient or partner has history of genital herpes?--no.  3.  Rash or viral illness since LMP?--no.    4.  History of STD (GC, CT, HPV, syphilis, HIV)? --no   5. Other: OTHER? No past medical history on file. No past surgical history on file.   Social History     Occupational History    Not on file   Tobacco Use    Smoking status: Never Smoker    Smokeless tobacco: Never Used   Substance and Sexual Activity    Alcohol use: Never     Frequency: Never    Drug use: Never    Sexual activity: Yes     Partners: Male     Family History   Problem Relation Age of Onset    Hypertension Mother      OB History    Para Term  AB Living   6 5 5         SAB TAB Ectopic Molar Multiple Live Births                    # Outcome Date GA Lbr Tony/2nd Weight Sex Delivery Anes PTL Lv   6 Current 5 Term      Vag-Spont      4 Term      Vag-Spont      3 Term      Vag-Spont      2 Term      Vag-Spont      1 Term      Vag-Spont        No Known Allergies  Prior to Admission medications    Medication Sig Start Date End Date Taking? Authorizing Provider   -iron-[id number]-dha-epa (Vitafol Gummies) 3.33 mg iron- 0.33 mg chew Take 1 Tab by mouth daily. 11/16/20   Lata Marr MD   doxylamine-pyridoxine, vit B6, (Diclegis) 10-10 mg TbEC DR tablet Take 2 tablets by mouth nightly. May add 1 tab in the morning and 1 tab in the afternoon. 4 tabs max daily. #120 tabs for 30 days 11/16/20   Lata Marr MD   ondansetron (Zofran ODT) 8 mg disintegrating tablet Take 1 Tab by mouth every eight (8) hours as needed for Nausea.  11/16/20   Lata Marr MD        Review of Systems: History obtained from the patient  Constitutional: negative for weight loss, fever, night sweats  HEENT: negative for hearing loss, earache, congestion, snoring, sore throat  CV: negative for chest pain, palpitations, edema  Resp: negative for cough, shortness of breath, wheezing  Breast: negative for breast lumps, nipple discharge, galactorrhea  GI: negative for change in bowel habits, abdominal pain, black or bloody stools  : negative for frequency, dysuria, hematuria, vaginal discharge  MSK: negative for back pain, joint pain, muscle pain  Skin: negative for itching, rash, hives  Neuro: negative for dizziness, headache, confusion, weakness  Psych: negative for anxiety, depression, change in mood  Heme/lymph: negative for bleeding, bruising, pallor    Objective:  Visit Vitals  LMP 09/07/2020 (Exact Date)       Physical Exam:     Constitutional  · Appearance: well-nourished, well developed, alert, in no acute distress    HENT  · Head  · Face: appears normal  · Eyes: appear normal  · Ears: normal  · Mouth: normal  · Lips: no lesions    Neck  · Inspection/Palpation: normal appearance, no masses or tenderness  · Lymph Nodes: no lymphadenopathy present  · Thyroid: gland size normal, nontender, no nodules or masses present on palpation    Chest  · Respiratory Effort: breathing unlabored  · Auscultation: normal breath sounds    Cardiovascular  · Heart:  · Auscultation: regular rate and rhythm without murmur    Gastrointestinal  · Abdominal Examination: abdomen non-tender to palpation, normal bowel sounds, no masses present  · Liver and spleen: no hepatomegaly present, spleen not palpable  · Hernias: no hernias identified    Skin  · General Inspection: no rash, no lesions identified    Neurologic/Psychiatric  · Mental Status:  · Orientation: grossly oriented to person, place and time  · Mood and Affect: mood normal, affect appropriate    Assessment:   Intrauterine pregnancy with the following problems identified: AMA- NIPS in 2 weeks  H/o PIH - baseline labs and baby ASA rec  H/o GDM- early glucola at next visit. Plan:     Offered CF testing, CVS, Nuchal Translucency, MSAFP, amnio, and discussed NIPT  Course of pregnancy discussed including visit schedule, routine U/S, glucola testing, etc.  Avoid alcoholic beverages and illicit/recreational drugs use  Take prenatal vitamins or folic acid daily. Hospital and practice style discussed with coverage system. Discussed nutrition, toxoplasmosis precautions, sexual activity, exercise, need for influenza vaccine, environmental and work hazards, travel advice, screen for domestic violence, need for seat belts. Discussed seafood, unpasteurized dairy products, deli meat, artificial sweeteners, and caffeine. Information on prenatal classes/breastfeeding given. Information on circumcision given  Patient encouraged not to smoke. Discussed current prescription drug use. Given medication list.  Discussed the use of over the counter medications and chemicals. Route of delivery discussed, including risks, benefits, and alternatives of  versus repeat LTCS.   Pt understands risk of hemorrhage during pregnancy and post delivery and would accept blood products if necessary in life-threatening emergencies    Handouts given to pt.

## 2020-11-30 ENCOUNTER — LAB ONLY (OUTPATIENT)
Dept: OBGYN CLINIC | Age: 35
End: 2020-11-30

## 2020-11-30 DIAGNOSIS — Z32.01 PREGNANCY EXAMINATION OR TEST, POSITIVE RESULT: Primary | ICD-10-CM

## 2020-11-30 LAB
ANTIBODY SCREEN, EXTERNAL: NEGATIVE
HBSAG, EXTERNAL: NEGATIVE
HIV, EXTERNAL: NORMAL
RPR, EXTERNAL: NORMAL
RUBELLA, EXTERNAL: NORMAL
TYPE, ABO & RH, EXTERNAL: NORMAL

## 2020-12-02 LAB
ABO GROUP BLD: NORMAL
ALBUMIN SERPL-MCNC: 4.3 G/DL (ref 3.8–4.8)
ALBUMIN/GLOB SERPL: 1.4 {RATIO} (ref 1.2–2.2)
ALP SERPL-CCNC: 63 IU/L (ref 39–117)
ALT SERPL-CCNC: 16 IU/L (ref 0–32)
AST SERPL-CCNC: 9 IU/L (ref 0–40)
B19V IGG SER IA-ACNC: 6.9 INDEX (ref 0–0.8)
B19V IGM SER IA-ACNC: 0.1 INDEX (ref 0–0.8)
BILIRUB SERPL-MCNC: 0.7 MG/DL (ref 0–1.2)
BLD GP AB SCN SERPL QL: NEGATIVE
BUN SERPL-MCNC: 10 MG/DL (ref 6–20)
BUN/CREAT SERPL: 20 (ref 9–23)
CALCIUM SERPL-MCNC: 9.5 MG/DL (ref 8.7–10.2)
CHLORIDE SERPL-SCNC: 104 MMOL/L (ref 96–106)
CO2 SERPL-SCNC: 19 MMOL/L (ref 20–29)
CREAT SERPL-MCNC: 0.49 MG/DL (ref 0.57–1)
ERYTHROCYTE [DISTWIDTH] IN BLOOD BY AUTOMATED COUNT: 13.3 % (ref 11.7–15.4)
GLOBULIN SER CALC-MCNC: 3.1 G/DL (ref 1.5–4.5)
GLUCOSE SERPL-MCNC: 143 MG/DL (ref 65–99)
HBV SURFACE AG SERPL QL IA: NEGATIVE
HCT VFR BLD AUTO: 37.5 % (ref 34–46.6)
HGB BLD-MCNC: 12.5 G/DL (ref 11.1–15.9)
HIV 1+2 AB+HIV1 P24 AG SERPL QL IA: NON REACTIVE
MCH RBC QN AUTO: 26.8 PG (ref 26.6–33)
MCHC RBC AUTO-ENTMCNC: 33.3 G/DL (ref 31.5–35.7)
MCV RBC AUTO: 81 FL (ref 79–97)
PLATELET # BLD AUTO: 253 X10E3/UL (ref 150–450)
POTASSIUM SERPL-SCNC: 4.1 MMOL/L (ref 3.5–5.2)
PROT SERPL-MCNC: 7.4 G/DL (ref 6–8.5)
RBC # BLD AUTO: 4.66 X10E6/UL (ref 3.77–5.28)
RH BLD: POSITIVE
RPR SER QL: NON REACTIVE
RUBV IGG SERPL IA-ACNC: 11.7 INDEX
SODIUM SERPL-SCNC: 139 MMOL/L (ref 134–144)
WBC # BLD AUTO: 5 X10E3/UL (ref 3.4–10.8)

## 2020-12-05 ENCOUNTER — APPOINTMENT (OUTPATIENT)
Dept: ULTRASOUND IMAGING | Age: 35
End: 2020-12-05
Attending: STUDENT IN AN ORGANIZED HEALTH CARE EDUCATION/TRAINING PROGRAM
Payer: MEDICAID

## 2020-12-05 ENCOUNTER — HOSPITAL ENCOUNTER (EMERGENCY)
Age: 35
Discharge: HOME | End: 2020-12-05
Attending: STUDENT IN AN ORGANIZED HEALTH CARE EDUCATION/TRAINING PROGRAM
Payer: MEDICAID

## 2020-12-05 VITALS
HEIGHT: 64 IN | BODY MASS INDEX: 32.95 KG/M2 | HEART RATE: 95 BPM | DIASTOLIC BLOOD PRESSURE: 66 MMHG | TEMPERATURE: 98 F | RESPIRATION RATE: 22 BRPM | WEIGHT: 193 LBS | OXYGEN SATURATION: 99 % | SYSTOLIC BLOOD PRESSURE: 132 MMHG

## 2020-12-05 DIAGNOSIS — K82.8 GALLBLADDER SLUDGE: Primary | ICD-10-CM

## 2020-12-05 LAB
ALBUMIN SERPL-MCNC: 4 G/DL (ref 3.5–5)
ALBUMIN/GLOB SERPL: 1 {RATIO} (ref 1.1–2.2)
ALP SERPL-CCNC: 65 U/L (ref 45–117)
ALT SERPL-CCNC: 28 U/L (ref 12–78)
ANION GAP SERPL CALC-SCNC: 6 MMOL/L (ref 5–15)
APPEARANCE UR: CLEAR
AST SERPL-CCNC: 9 U/L (ref 15–37)
BACTERIA URNS QL MICRO: NEGATIVE /HPF
BASOPHILS # BLD: 0 K/UL (ref 0–0.1)
BASOPHILS NFR BLD: 0 % (ref 0–1)
BILIRUB SERPL-MCNC: 0.5 MG/DL (ref 0.2–1)
BILIRUB UR QL CFM: POSITIVE
BUN SERPL-MCNC: 10 MG/DL (ref 6–20)
BUN/CREAT SERPL: 17 (ref 12–20)
CALCIUM SERPL-MCNC: 9.5 MG/DL (ref 8.5–10.1)
CHLORIDE SERPL-SCNC: 106 MMOL/L (ref 97–108)
CO2 SERPL-SCNC: 24 MMOL/L (ref 21–32)
COLOR UR: ABNORMAL
COMMENT, HOLDF: NORMAL
CREAT SERPL-MCNC: 0.6 MG/DL (ref 0.55–1.02)
DIFFERENTIAL METHOD BLD: NORMAL
EOSINOPHIL # BLD: 0.1 K/UL (ref 0–0.4)
EOSINOPHIL NFR BLD: 1 % (ref 0–7)
EPITH CASTS URNS QL MICRO: ABNORMAL /LPF
ERYTHROCYTE [DISTWIDTH] IN BLOOD BY AUTOMATED COUNT: 13.2 % (ref 11.5–14.5)
GLOBULIN SER CALC-MCNC: 4.1 G/DL (ref 2–4)
GLUCOSE SERPL-MCNC: 164 MG/DL (ref 65–100)
GLUCOSE UR STRIP.AUTO-MCNC: >1000 MG/DL
HCT VFR BLD AUTO: 37.9 % (ref 35–47)
HGB BLD-MCNC: 12.5 G/DL (ref 11.5–16)
HGB UR QL STRIP: ABNORMAL
IMM GRANULOCYTES # BLD AUTO: 0 K/UL (ref 0–0.04)
IMM GRANULOCYTES NFR BLD AUTO: 0 % (ref 0–0.5)
KETONES UR QL STRIP.AUTO: 15 MG/DL
LEUKOCYTE ESTERASE UR QL STRIP.AUTO: NEGATIVE
LIPASE SERPL-CCNC: 130 U/L (ref 73–393)
LYMPHOCYTES # BLD: 2.3 K/UL (ref 0.8–3.5)
LYMPHOCYTES NFR BLD: 31 % (ref 12–49)
MCH RBC QN AUTO: 26.6 PG (ref 26–34)
MCHC RBC AUTO-ENTMCNC: 33 G/DL (ref 30–36.5)
MCV RBC AUTO: 80.6 FL (ref 80–99)
MONOCYTES # BLD: 0.4 K/UL (ref 0–1)
MONOCYTES NFR BLD: 6 % (ref 5–13)
NEUTS SEG # BLD: 4.7 K/UL (ref 1.8–8)
NEUTS SEG NFR BLD: 62 % (ref 32–75)
NITRITE UR QL STRIP.AUTO: NEGATIVE
NRBC # BLD: 0 K/UL (ref 0–0.01)
NRBC BLD-RTO: 0 PER 100 WBC
PH UR STRIP: 5.5 [PH] (ref 5–8)
PLATELET # BLD AUTO: 279 K/UL (ref 150–400)
PMV BLD AUTO: 10.4 FL (ref 8.9–12.9)
POTASSIUM SERPL-SCNC: 3.4 MMOL/L (ref 3.5–5.1)
PROT SERPL-MCNC: 8.1 G/DL (ref 6.4–8.2)
PROT UR STRIP-MCNC: 30 MG/DL
RBC # BLD AUTO: 4.7 M/UL (ref 3.8–5.2)
RBC #/AREA URNS HPF: ABNORMAL /HPF (ref 0–5)
SAMPLES BEING HELD,HOLD: NORMAL
SODIUM SERPL-SCNC: 136 MMOL/L (ref 136–145)
SP GR UR REFRACTOMETRY: >1.03 (ref 1–1.03)
UR CULT HOLD, URHOLD: NORMAL
UROBILINOGEN UR QL STRIP.AUTO: 0.2 EU/DL (ref 0.2–1)
WBC # BLD AUTO: 7.5 K/UL (ref 3.6–11)
WBC URNS QL MICRO: ABNORMAL /HPF (ref 0–4)

## 2020-12-05 PROCEDURE — 83690 ASSAY OF LIPASE: CPT

## 2020-12-05 PROCEDURE — 36415 COLL VENOUS BLD VENIPUNCTURE: CPT

## 2020-12-05 PROCEDURE — 80053 COMPREHEN METABOLIC PANEL: CPT

## 2020-12-05 PROCEDURE — 96375 TX/PRO/DX INJ NEW DRUG ADDON: CPT

## 2020-12-05 PROCEDURE — 96374 THER/PROPH/DIAG INJ IV PUSH: CPT

## 2020-12-05 PROCEDURE — 74011250636 HC RX REV CODE- 250/636: Performed by: STUDENT IN AN ORGANIZED HEALTH CARE EDUCATION/TRAINING PROGRAM

## 2020-12-05 PROCEDURE — 99284 EMERGENCY DEPT VISIT MOD MDM: CPT

## 2020-12-05 PROCEDURE — 81001 URINALYSIS AUTO W/SCOPE: CPT

## 2020-12-05 PROCEDURE — 85025 COMPLETE CBC W/AUTO DIFF WBC: CPT

## 2020-12-05 PROCEDURE — 74011250637 HC RX REV CODE- 250/637: Performed by: STUDENT IN AN ORGANIZED HEALTH CARE EDUCATION/TRAINING PROGRAM

## 2020-12-05 PROCEDURE — 76705 ECHO EXAM OF ABDOMEN: CPT

## 2020-12-05 RX ORDER — MORPHINE SULFATE 4 MG/ML
4 INJECTION INTRAVENOUS
Status: COMPLETED | OUTPATIENT
Start: 2020-12-05 | End: 2020-12-05

## 2020-12-05 RX ORDER — ONDANSETRON 2 MG/ML
4 INJECTION INTRAMUSCULAR; INTRAVENOUS
Status: COMPLETED | OUTPATIENT
Start: 2020-12-05 | End: 2020-12-05

## 2020-12-05 RX ORDER — ASPIRIN 81 MG/1
81 TABLET ORAL DAILY
COMMUNITY
End: 2021-07-23

## 2020-12-05 RX ORDER — MAG HYDROX/ALUMINUM HYD/SIMETH 200-200-20
30 SUSPENSION, ORAL (FINAL DOSE FORM) ORAL
Status: DISCONTINUED | OUTPATIENT
Start: 2020-12-05 | End: 2020-12-05 | Stop reason: HOSPADM

## 2020-12-05 RX ORDER — FAMOTIDINE 20 MG/1
20 TABLET, FILM COATED ORAL 2 TIMES DAILY
Qty: 20 TAB | Refills: 0 | Status: SHIPPED | OUTPATIENT
Start: 2020-12-05 | End: 2020-12-15

## 2020-12-05 RX ADMIN — FAMOTIDINE 20 MG: 10 INJECTION INTRAVENOUS at 03:01

## 2020-12-05 RX ADMIN — ALUMINUM HYDROXIDE, MAGNESIUM HYDROXIDE, AND SIMETHICONE 30 ML: 200; 200; 20 SUSPENSION ORAL at 03:01

## 2020-12-05 RX ADMIN — MORPHINE SULFATE 4 MG: 4 INJECTION INTRAVENOUS at 01:28

## 2020-12-05 RX ADMIN — ONDANSETRON 4 MG: 2 INJECTION INTRAMUSCULAR; INTRAVENOUS at 01:28

## 2020-12-05 RX ADMIN — SODIUM CHLORIDE 1000 ML: 900 INJECTION, SOLUTION INTRAVENOUS at 01:34

## 2020-12-05 NOTE — ED PROVIDER NOTES
Rosaline Grady is a 38 y.o.  female with past medical history notable for gestational diabetes, nephrolithiasis with previous pregnancy presenting with acute onset of epigastric pain rating to the back and midline. No history of similar pain. Denies hematuria, dysuria. No vaginal bleeding or discharge. States the pain is sharp, severe and constant. Also radiates to the shoulder blade somewhat. No jaundice or scleral icterus. Denies fevers or chills. No cough or respiratory symptoms. He has had mild nausea associated with her pregnancy but never had severe symptoms like this. She had multiple episodes of nonbloody nonbilious emesis in the past few hours. History reviewed. No pertinent past medical history. History reviewed. No pertinent surgical history.       Family History:   Problem Relation Age of Onset    Hypertension Mother        Social History     Socioeconomic History    Marital status:      Spouse name: Not on file    Number of children: Not on file    Years of education: Not on file    Highest education level: Not on file   Occupational History    Not on file   Social Needs    Financial resource strain: Not on file    Food insecurity     Worry: Not on file     Inability: Not on file    Transportation needs     Medical: Not on file     Non-medical: Not on file   Tobacco Use    Smoking status: Never Smoker    Smokeless tobacco: Never Used   Substance and Sexual Activity    Alcohol use: Never     Frequency: Never    Drug use: Never    Sexual activity: Yes     Partners: Male   Lifestyle    Physical activity     Days per week: Not on file     Minutes per session: Not on file    Stress: Not on file   Relationships    Social connections     Talks on phone: Not on file     Gets together: Not on file     Attends Druze service: Not on file     Active member of club or organization: Not on file     Attends meetings of clubs or organizations: Not on file Relationship status: Not on file    Intimate partner violence     Fear of current or ex partner: Not on file     Emotionally abused: Not on file     Physically abused: Not on file     Forced sexual activity: Not on file   Other Topics Concern    Not on file   Social History Narrative    Not on file         ALLERGIES: Patient has no known allergies. Review of Systems   Constitutional: Negative for chills and fever. Eyes: Negative for photophobia. Respiratory: Negative for shortness of breath. Cardiovascular: Negative for chest pain. Gastrointestinal: Positive for abdominal pain, nausea and vomiting. Genitourinary: Negative for dysuria and flank pain. Musculoskeletal: Negative for back pain. Neurological: Negative for headaches. Psychiatric/Behavioral: Negative for confusion. All other systems reviewed and are negative. Vitals:    12/05/20 0130 12/05/20 0144 12/05/20 0145 12/05/20 0215   BP: 136/68  138/71 132/66   Pulse:       Resp:       Temp:       SpO2: 98% 100% 96% 98%   Weight:       Height:                Physical Exam  Vitals signs reviewed. Constitutional:       General: She is not in acute distress. HENT:      Head: Normocephalic and atraumatic. Mouth/Throat:      Mouth: Mucous membranes are moist.      Pharynx: Oropharynx is clear. Cardiovascular:      Rate and Rhythm: Normal rate and regular rhythm. Heart sounds: Normal heart sounds. Pulmonary:      Effort: Pulmonary effort is normal.      Breath sounds: Normal breath sounds. Abdominal:      Tenderness: There is no abdominal tenderness. There is no guarding or rebound. Musculoskeletal: Normal range of motion. Skin:     General: Skin is warm and dry. Capillary Refill: Capillary refill takes less than 2 seconds. Neurological:      General: No focal deficit present. Mental Status: She is alert and oriented to person, place, and time.    Psychiatric:         Mood and Affect: Mood normal. MDM  Number of Diagnoses or Management Options  Gallbladder sludge:      Amount and/or Complexity of Data Reviewed  Clinical lab tests: reviewed         Rosaline Grady is a 38 y.o. female presenting with epigastric abdominal pain radiating to the back, sudden onset at midnight. Generalized p.o. was around 10 PM.  She has not had fevers or chills. Her symptoms were severe initially. They completely resolved after he received 1 dose of morphine 4 mg. She is completely pain-free at this point. Her labs are unremarkable, her ultrasound reveals only gallbladder sludge. Differential includes symptomatic gallbladder disease, biliary dyskinesia, peptic ulcer disease, gastritis. Course: Symptoms resolved. Dispo: Discharge with follow-up with OB/GYN, trial antacid, small low-fat meals, return if worse/recurrent pain.      Procedures

## 2020-12-05 NOTE — DISCHARGE INSTRUCTIONS
It is unclear whether you are having symptoms from gallbladder sludge versus gastritis or symptoms from peptic ulcer disease. If you have recurrent episodes of gallbladder attacks you should return to the emergency department. Please call your OB/GYN to let them know about your episode today.

## 2020-12-05 NOTE — ED TRIAGE NOTES
Patient arrives for complaints of epigastric pain and 1 episode of diarrhea, denies vomiting     Patient 11 weeks pregnant

## 2020-12-15 ENCOUNTER — ROUTINE PRENATAL (OUTPATIENT)
Dept: OBGYN CLINIC | Age: 35
End: 2020-12-15
Payer: MEDICAID

## 2020-12-15 VITALS — BODY MASS INDEX: 32.54 KG/M2 | SYSTOLIC BLOOD PRESSURE: 130 MMHG | WEIGHT: 189.6 LBS | DIASTOLIC BLOOD PRESSURE: 73 MMHG

## 2020-12-15 DIAGNOSIS — Z34.80 SUPERVISION OF OTHER NORMAL PREGNANCY: Primary | ICD-10-CM

## 2020-12-15 DIAGNOSIS — O09.529 ANTEPARTUM MULTIGRAVIDA OF ADVANCED MATERNAL AGE: ICD-10-CM

## 2020-12-15 DIAGNOSIS — R11.0 NAUSEA: ICD-10-CM

## 2020-12-15 PROCEDURE — 0502F SUBSEQUENT PRENATAL CARE: CPT | Performed by: OBSTETRICS & GYNECOLOGY

## 2020-12-16 ENCOUNTER — HOSPITAL ENCOUNTER (OUTPATIENT)
Dept: PERINATAL CARE | Age: 35
Discharge: HOME | End: 2020-12-16
Attending: OBSTETRICS & GYNECOLOGY
Payer: MEDICAID

## 2020-12-16 LAB — GLUCOSE 1H P 50 G GLC PO SERPL-MCNC: 298 MG/DL (ref 65–139)

## 2020-12-16 PROCEDURE — 76801 OB US < 14 WKS SINGLE FETUS: CPT | Performed by: OBSTETRICS & GYNECOLOGY

## 2020-12-16 PROCEDURE — 76813 OB US NUCHAL MEAS 1 GEST: CPT | Performed by: OBSTETRICS & GYNECOLOGY

## 2020-12-16 PROCEDURE — 99204 OFFICE O/P NEW MOD 45 MIN: CPT | Performed by: OBSTETRICS & GYNECOLOGY

## 2020-12-22 ENCOUNTER — HOSPITAL ENCOUNTER (OUTPATIENT)
Dept: ULTRASOUND IMAGING | Age: 35
Discharge: HOME | End: 2020-12-22
Attending: OBSTETRICS & GYNECOLOGY
Payer: MEDICAID

## 2020-12-22 DIAGNOSIS — R11.0 NAUSEA: ICD-10-CM

## 2020-12-22 PROCEDURE — 76705 ECHO EXAM OF ABDOMEN: CPT

## 2020-12-28 ENCOUNTER — HOSPITAL ENCOUNTER (EMERGENCY)
Age: 35
Discharge: HOME | End: 2020-12-28
Attending: EMERGENCY MEDICINE
Payer: MEDICAID

## 2020-12-28 ENCOUNTER — TELEPHONE (OUTPATIENT)
Dept: OBGYN CLINIC | Age: 35
End: 2020-12-28

## 2020-12-28 VITALS
WEIGHT: 189.6 LBS | DIASTOLIC BLOOD PRESSURE: 51 MMHG | HEIGHT: 64 IN | BODY MASS INDEX: 32.37 KG/M2 | HEART RATE: 74 BPM | TEMPERATURE: 98.2 F | SYSTOLIC BLOOD PRESSURE: 106 MMHG | OXYGEN SATURATION: 100 % | RESPIRATION RATE: 16 BRPM

## 2020-12-28 DIAGNOSIS — Z3A.14 14 WEEKS GESTATION OF PREGNANCY: ICD-10-CM

## 2020-12-28 DIAGNOSIS — R10.819 ABDOMINAL TENDERNESS WITHOUT REBOUND TENDERNESS, UNSPECIFIED LOCATION: Primary | ICD-10-CM

## 2020-12-28 DIAGNOSIS — Z34.80 SUPERVISION OF OTHER NORMAL PREGNANCY: Primary | ICD-10-CM

## 2020-12-28 LAB
ALBUMIN SERPL-MCNC: 3.9 G/DL (ref 3.5–5)
ALBUMIN/GLOB SERPL: 0.9 {RATIO} (ref 1.1–2.2)
ALP SERPL-CCNC: 60 U/L (ref 45–117)
ALT SERPL-CCNC: 18 U/L (ref 12–78)
ANION GAP SERPL CALC-SCNC: 7 MMOL/L (ref 5–15)
APPEARANCE UR: CLEAR
AST SERPL-CCNC: 13 U/L (ref 15–37)
ATRIAL RATE: 103 BPM
BACTERIA URNS QL MICRO: NEGATIVE /HPF
BASOPHILS # BLD: 0 K/UL (ref 0–0.1)
BASOPHILS NFR BLD: 0 % (ref 0–1)
BILIRUB SERPL-MCNC: 0.4 MG/DL (ref 0.2–1)
BILIRUB UR QL: NEGATIVE
BUN SERPL-MCNC: 11 MG/DL (ref 6–20)
BUN/CREAT SERPL: 19 (ref 12–20)
CALCIUM SERPL-MCNC: 9.4 MG/DL (ref 8.5–10.1)
CALCULATED P AXIS, ECG09: 31 DEGREES
CALCULATED R AXIS, ECG10: 45 DEGREES
CALCULATED T AXIS, ECG11: 34 DEGREES
CHLORIDE SERPL-SCNC: 104 MMOL/L (ref 97–108)
CO2 SERPL-SCNC: 26 MMOL/L (ref 21–32)
COLOR UR: ABNORMAL
COMMENT, HOLDF: NORMAL
CREAT SERPL-MCNC: 0.58 MG/DL (ref 0.55–1.02)
DIAGNOSIS, 93000: NORMAL
DIFFERENTIAL METHOD BLD: ABNORMAL
EOSINOPHIL # BLD: 0.1 K/UL (ref 0–0.4)
EOSINOPHIL NFR BLD: 1 % (ref 0–7)
EPITH CASTS URNS QL MICRO: ABNORMAL /LPF
ERYTHROCYTE [DISTWIDTH] IN BLOOD BY AUTOMATED COUNT: 13.7 % (ref 11.5–14.5)
GLOBULIN SER CALC-MCNC: 4.4 G/DL (ref 2–4)
GLUCOSE SERPL-MCNC: 130 MG/DL (ref 65–100)
GLUCOSE UR STRIP.AUTO-MCNC: >1000 MG/DL
HCG SERPL-ACNC: ABNORMAL MIU/ML (ref 0–6)
HCT VFR BLD AUTO: 37.4 % (ref 35–47)
HGB BLD-MCNC: 12.6 G/DL (ref 11.5–16)
HGB UR QL STRIP: ABNORMAL
HYALINE CASTS URNS QL MICRO: ABNORMAL /LPF (ref 0–5)
IMM GRANULOCYTES # BLD AUTO: 0 K/UL (ref 0–0.04)
IMM GRANULOCYTES NFR BLD AUTO: 1 % (ref 0–0.5)
KETONES UR QL STRIP.AUTO: ABNORMAL MG/DL
LEUKOCYTE ESTERASE UR QL STRIP.AUTO: NEGATIVE
LIPASE SERPL-CCNC: 186 U/L (ref 73–393)
LYMPHOCYTES # BLD: 1.9 K/UL (ref 0.8–3.5)
LYMPHOCYTES NFR BLD: 28 % (ref 12–49)
MCH RBC QN AUTO: 27.4 PG (ref 26–34)
MCHC RBC AUTO-ENTMCNC: 33.7 G/DL (ref 30–36.5)
MCV RBC AUTO: 81.3 FL (ref 80–99)
MONOCYTES # BLD: 0.4 K/UL (ref 0–1)
MONOCYTES NFR BLD: 6 % (ref 5–13)
NEUTS SEG # BLD: 4.5 K/UL (ref 1.8–8)
NEUTS SEG NFR BLD: 64 % (ref 32–75)
NITRITE UR QL STRIP.AUTO: NEGATIVE
NRBC # BLD: 0 K/UL (ref 0–0.01)
NRBC BLD-RTO: 0 PER 100 WBC
P-R INTERVAL, ECG05: 160 MS
PH UR STRIP: 6 [PH] (ref 5–8)
PLATELET # BLD AUTO: 266 K/UL (ref 150–400)
PMV BLD AUTO: 10.6 FL (ref 8.9–12.9)
POTASSIUM SERPL-SCNC: 3.3 MMOL/L (ref 3.5–5.1)
PROT SERPL-MCNC: 8.3 G/DL (ref 6.4–8.2)
PROT UR STRIP-MCNC: 30 MG/DL
Q-T INTERVAL, ECG07: 340 MS
QRS DURATION, ECG06: 88 MS
QTC CALCULATION (BEZET), ECG08: 445 MS
RBC # BLD AUTO: 4.6 M/UL (ref 3.8–5.2)
RBC #/AREA URNS HPF: ABNORMAL /HPF (ref 0–5)
SAMPLES BEING HELD,HOLD: NORMAL
SODIUM SERPL-SCNC: 137 MMOL/L (ref 136–145)
SP GR UR REFRACTOMETRY: 1.03 (ref 1–1.03)
TROPONIN I SERPL-MCNC: <0.05 NG/ML
UROBILINOGEN UR QL STRIP.AUTO: 1 EU/DL (ref 0.2–1)
VENTRICULAR RATE, ECG03: 103 BPM
WBC # BLD AUTO: 6.9 K/UL (ref 3.6–11)
WBC URNS QL MICRO: ABNORMAL /HPF (ref 0–4)

## 2020-12-28 PROCEDURE — 96374 THER/PROPH/DIAG INJ IV PUSH: CPT

## 2020-12-28 PROCEDURE — 96375 TX/PRO/DX INJ NEW DRUG ADDON: CPT

## 2020-12-28 PROCEDURE — 84702 CHORIONIC GONADOTROPIN TEST: CPT

## 2020-12-28 PROCEDURE — 80053 COMPREHEN METABOLIC PANEL: CPT

## 2020-12-28 PROCEDURE — 74011250636 HC RX REV CODE- 250/636: Performed by: EMERGENCY MEDICINE

## 2020-12-28 PROCEDURE — 36415 COLL VENOUS BLD VENIPUNCTURE: CPT

## 2020-12-28 PROCEDURE — 99284 EMERGENCY DEPT VISIT MOD MDM: CPT

## 2020-12-28 PROCEDURE — 99285 EMERGENCY DEPT VISIT HI MDM: CPT

## 2020-12-28 PROCEDURE — 85025 COMPLETE CBC W/AUTO DIFF WBC: CPT

## 2020-12-28 PROCEDURE — 81001 URINALYSIS AUTO W/SCOPE: CPT

## 2020-12-28 PROCEDURE — 84484 ASSAY OF TROPONIN QUANT: CPT

## 2020-12-28 PROCEDURE — 93005 ELECTROCARDIOGRAM TRACING: CPT

## 2020-12-28 PROCEDURE — 83690 ASSAY OF LIPASE: CPT

## 2020-12-28 RX ORDER — ACETAMINOPHEN 500 MG
1000 TABLET ORAL ONCE
Status: DISCONTINUED | OUTPATIENT
Start: 2020-12-28 | End: 2020-12-28

## 2020-12-28 RX ORDER — FENTANYL CITRATE 50 UG/ML
50 INJECTION, SOLUTION INTRAMUSCULAR; INTRAVENOUS
Status: COMPLETED | OUTPATIENT
Start: 2020-12-28 | End: 2020-12-28

## 2020-12-28 RX ORDER — ONDANSETRON 2 MG/ML
4 INJECTION INTRAMUSCULAR; INTRAVENOUS
Status: COMPLETED | OUTPATIENT
Start: 2020-12-28 | End: 2020-12-28

## 2020-12-28 RX ADMIN — FENTANYL CITRATE 50 MCG: 50 INJECTION, SOLUTION INTRAMUSCULAR; INTRAVENOUS at 01:57

## 2020-12-28 RX ADMIN — ONDANSETRON 4 MG: 2 INJECTION INTRAMUSCULAR; INTRAVENOUS at 01:57

## 2020-12-28 NOTE — ED TRIAGE NOTES
Acute onset upper left abdominal pain radiating to the back starting at 0100. \"I think its my gallbladder\". Pt admits she has had this pain before and it is very similar. Pt admits to vomiting x4 PTA.

## 2020-12-28 NOTE — ED PROVIDER NOTES
Please note that this dictation was completed with [employer], the computer voice recognition software.  Quite often unanticipated grammatical, syntax, homophones, and other interpretive errors are inadvertently transcribed by the computer software.  Please disregard these errors.  Please excuse any errors that have escaped final proofreadingMarissa Grady is a 38 y.o.  female at 12 weeks  with past medical history notable for gestational diabetes, nephrolithiasis with previous episode of 'gall bladder pains seen here   presenting with acute onset of epigastric pain rating to the back and midline. Pt states 'onset 40 min ago after eating a hamburger at 11 or 11:30/ vomited x 4; tried to take tylenol but I threw it up'; pain achuing/ sharp/ constant RUQ/ Mid-epig/ LUQ, 'worse than last time'; No recent illnesses, diarrhea/ VB/ Vag d/c; 'is 14 weeks OB'; (+) fetalmovements;     pt denies HA, vison changes, diff swallowing, CP, SOB, F/Ch, D/Cons or other current systemic complaints    Social/ PSH reviewed in EMR    EMR Chart Reviewed           No past medical history on file. No past surgical history on file.       Family History:   Problem Relation Age of Onset    Hypertension Mother        Social History     Socioeconomic History    Marital status:      Spouse name: Not on file    Number of children: Not on file    Years of education: Not on file    Highest education level: Not on file   Occupational History    Not on file   Social Needs    Financial resource strain: Not on file    Food insecurity     Worry: Not on file     Inability: Not on file    Transportation needs     Medical: Not on file     Non-medical: Not on file   Tobacco Use    Smoking status: Never Smoker    Smokeless tobacco: Never Used   Substance and Sexual Activity    Alcohol use: Never     Frequency: Never    Drug use: Never    Sexual activity: Yes     Partners: Male   Lifestyle    Physical activity     Days per week: Not on file     Minutes per session: Not on file    Stress: Not on file   Relationships    Social connections     Talks on phone: Not on file     Gets together: Not on file     Attends Sabianism service: Not on file     Active member of club or organization: Not on file     Attends meetings of clubs or organizations: Not on file     Relationship status: Not on file    Intimate partner violence     Fear of current or ex partner: Not on file     Emotionally abused: Not on file     Physically abused: Not on file     Forced sexual activity: Not on file   Other Topics Concern    Not on file   Social History Narrative    Not on file         ALLERGIES: Patient has no known allergies. Review of Systems   Constitutional: Positive for appetite change. Negative for chills and fever. HENT: Negative for trouble swallowing and voice change. Eyes: Negative for visual disturbance. Respiratory: Negative for chest tightness and shortness of breath. Cardiovascular: Negative for chest pain and palpitations. Gastrointestinal: Positive for abdominal pain, nausea and vomiting. Negative for diarrhea. Genitourinary: Negative for dysuria, vaginal bleeding and vaginal discharge. Neurological: Negative for speech difficulty. All other systems reviewed and are negative. Vitals:    12/28/20 0123   BP: (!) 164/107   Pulse: 100   Resp: 20   Temp: 98.1 °F (36.7 °C)   SpO2: 100%   Weight: 86 kg (189 lb 9.6 oz)   Height: 5' 4\" (1.626 m)            Physical Exam  Vitals signs and nursing note reviewed. Constitutional:       General: She is not in acute distress. Appearance: Normal appearance. She is well-developed. She is not ill-appearing, toxic-appearing or diaphoretic. Comments: Uncomfortable appearing, AxOx4, speaking in complete sentences    Moaning, rocking;    HENT:      Head: Normocephalic and atraumatic.       Right Ear: External ear normal.      Left Ear: External ear normal.   Eyes: General: No scleral icterus. Right eye: No discharge. Extraocular Movements: Extraocular movements intact. Conjunctiva/sclera: Conjunctivae normal.      Pupils: Pupils are equal, round, and reactive to light. Neck:      Musculoskeletal: Normal range of motion and neck supple. Vascular: No JVD. Trachea: No tracheal deviation. Cardiovascular:      Rate and Rhythm: Normal rate and regular rhythm. Pulses: Normal pulses. Heart sounds: Normal heart sounds. No murmur. No friction rub. No gallop. Pulmonary:      Effort: Pulmonary effort is normal. No respiratory distress. Breath sounds: Normal breath sounds. No stridor. No wheezing, rhonchi or rales. Chest:      Chest wall: No tenderness. Abdominal:      General: Bowel sounds are normal.      Palpations: Abdomen is soft. Tenderness: There is abdominal tenderness in the right upper quadrant, epigastric area and left upper quadrant. There is no guarding or rebound. Hernia: No hernia is present. Comments: exam consistent w/ dates  Min ttp RUQ/ Mid-epigastric/ LUQ;   Neg peritoneal signs; Genitourinary:     Vagina: No vaginal discharge. Comments: Pt denies urinary/ vaginal complaints  Musculoskeletal: Normal range of motion. General: No tenderness. Skin:     General: Skin is warm and dry. Capillary Refill: Capillary refill takes less than 2 seconds. Coloration: Skin is not pale. Findings: No bruising, erythema or rash. Neurological:      General: No focal deficit present. Mental Status: She is alert and oriented to person, place, and time. Cranial Nerves: No cranial nerve deficit. Sensory: No sensory deficit. Motor: No weakness or abnormal muscle tone. Coordination: Coordination normal.      Gait: Gait normal.      Deep Tendon Reflexes: Reflexes normal.   Psychiatric:         Behavior: Behavior normal.         Thought Content:  Thought content normal. MDM       Procedures    Chief Complaint   Patient presents with    Abdominal Pain       1:35 AM  The patients presenting problems have been discussed, and they are in agreement with the care plan formulated and outlined with them. I have encouraged them to ask questions as they arise throughout their visit. MEDICATIONS GIVEN:  Medications   fentaNYL citrate (PF) injection 50 mcg (has no administration in time range)   ondansetron (ZOFRAN) injection 4 mg (has no administration in time range)       LABS REVIEWED:  Labs Reviewed   TROPONIN I   URINALYSIS W/MICROSCOPIC   SAMPLES BEING HELD   LIPASE   METABOLIC PANEL, COMPREHENSIVE   CBC WITH AUTOMATED DIFF       RADIOLOGY RESULTS:  The following have been ordered and reviewed:  _____________________________________________________________________  _____________________________________________________________________    EKG interpretation:   Rhythm: normal sinus rhythm; and regular . Rate (approx.): 100; Axis: normal; P wave: normal; QRS interval: normal ; ST/T wave: normal; Negative acute significant segmental elevations/ no old study for comparison    PROCEDURES:        CONSULTATIONS:       PROGRESS NOTES:      DIAGNOSIS:    1. Abdominal tenderness without rebound tenderness, unspecified location    2. 14 weeks gestation of pregnancy        PLAN:  1-gall stones/ RUQ/ mid-epig/ LUQ  abd tenderness; Pt agrees w/ plans    ED COURSE: The patients hospital course has been uncomplicated. 1:48 AM  'feeling better now'; awaiting results;      2:58 AM  Pt told of results thus far; told of '(+) gallstones on prior US/ anything you eat w/ fat will contract it/ cuse pain'; Pt agrees w/ plans; FHT = 150's;     3:35 AM  Rosaline Missed's  results have been reviewed with her. She has been counseled regarding her diagnosis.   She verbally conveys understanding and agreement of the signs, symptoms, diagnosis, treatment and prognosis and additionally agrees to Call/ Arrange follow up as recommended with Dr. None in 24 - 48 hours. She also agrees with the care-plan and conveys that all of her questions have been answered. I have also put together some discharge instructions for her that include: 1) educational information regarding their diagnosis, 2) how to care for their diagnosis at home, as well a 3) list of reasons why they would want to return to the ED prior to their follow-up appointment, should their condition change or for concerns.

## 2020-12-28 NOTE — ED NOTES
Pt discharged with written instructions and verbalized understanding. Ambulatory out of the department with a steady gait and in no acute distress.

## 2020-12-28 NOTE — TELEPHONE ENCOUNTER
Pt is waiting for an appt to be made with general surgeon to see about her gallbladder. She was just seen in ED for it. She also wants Dr. Andrez Lopez to know that her blood sugars have been high lately.

## 2020-12-29 ENCOUNTER — PATIENT MESSAGE (OUTPATIENT)
Dept: OBGYN CLINIC | Age: 35
End: 2020-12-29

## 2020-12-29 NOTE — TELEPHONE ENCOUNTER
12/29/20  10:35 am- spoke with patient she was calling to confirm this information. Read the paragraph to her and confirmed.

## 2020-12-29 NOTE — PROGRESS NOTES
Pt saw MFM.   Referral and records faxed to endo for appt and education appt scheduled for 1/5/2021 @ 1:00pm.

## 2021-01-05 ENCOUNTER — CLINICAL SUPPORT (OUTPATIENT)
Dept: DIABETES SERVICES | Age: 36
End: 2021-01-05
Payer: MEDICAID

## 2021-01-05 DIAGNOSIS — O24.410 DIET CONTROLLED GESTATIONAL DIABETES MELLITUS (GDM) IN SECOND TRIMESTER: Primary | ICD-10-CM

## 2021-01-05 PROCEDURE — G0108 DIAB MANAGE TRN  PER INDIV: HCPCS | Performed by: DIETITIAN, REGISTERED

## 2021-01-05 NOTE — PROGRESS NOTES
[facility] for Diabetes Health  Diabetes Self-Management Education   Pre-program Assessment    Reason for Referral: GDM   Referral Source: John Frances MD   PCP: none- pt encouraged to get one established as she may need help managing Type 2 DM after the pregnancy    This is her 6th pregnancy and her 2rd with GDM. Reports no previous education on GDM and did not have f/u testing after her 5th pregnancy 18 mos ago to assess for Type 2 DM. Pt reports first time with GDM was able to diet control and last one she used insulin. She is testing her BS already with her parents BS meters and has to go their home to do this so will have a meter prescription requested for her. Her insurance pays for a True Metrix meter. She reports all pre meal BS are >140 mg/dl and all 2 hour post meal BS are >200 mg/dl. She is eating very little currently due to gallbladder issues and is having a lot of vomiting. She is also afraid to eat when she sees these high BS. This pt needs insulin asap and spoke with Darrell Taylor RN for Dr. Trevor Matthews and she will work today on getting and endo or MFM appt asap so insulin can be started. Wgt based calculations show 69 units TDD 34 units basal/34 units bolus  (wgt today: 190.6#)    Reviewed with pt what is GDM vs. Type 2 DM, reviewed monitoring technique, disposal of sharps, targets for pre and post meals. Instructed on GDM meal plan and provided written sample menu and resources for carbs, snack ideas, fish in pregnancy. Metric Patient responses (1/5/2021)   Healthy Eating     24-hour Dietary Recall:  Breakfast: 1 ww toast, 1 egg and 1 cheese slice  Lunch: baked chicken and baked potatoes  Dinner: turkey and cheese s/w on  ww bread w/ lettuce   Snacks: baked chips midafternoon  Beverages: water only  Alcohol: never    Pt having issues with gallbladder and will see surgeon on Monday for resolution of issue.   Trying to limit high fat foods and eating very little currently due to pain and nausea. Also afraid to eat with high BS. Stage of change: Action     Being Active     Physical Activity Vital Sign:  How many days during the past week have you performed physical activity where your heart beats faster and your breathing is harder than normal for 30 minutes or more?  0 days    How many days in a typical week do you perform activity such as this?  0 days     Reports has a large house so activity is doing housework and going up and down stairs    Stage of change: Preparation     Monitoring Do you monitor your blood sugar? Yes    How often do you monitor? before and 2 hours after each meal    What are the range of readings? 140-250 mg/dL  Before meals: never lower than 140 mg/dl  2 hours post meal:  Never lower than 200 mg/dl    Stage of change: Action     Needs her own meter and this was requested from Dr. Trevor Matthews office     Taking Medications Medication Management:  Do you understand what your diabetes medications do? not applicable    Can you afford your diabetes medications? Yes    How often do you miss doses of your diabetes medications? Not taking diabetes medication    Current dosing:   Key Antihyperglycemic Medications     Patient is on no antihyperglycemic meds. Clot Prevention:  Key Anti-Platelet Anticoagulant Meds             aspirin delayed-release 81 mg tablet Take 81 mg by mouth daily. Stage of change: Preparation     Healthy Coping Diabetes Skills, Confidence and Preparedness Index:    Stage of change: Action     Reducing Risks Vaccines:  Influenza: There is no immunization history for the selected administration types on file for this patient. Anticipates getting one next week with her OB appt      Heart Protection:  BP Readings from Last 2 Encounters:   12/28/20 (!) 106/51   12/15/20 130/73        Stage of change: Preparation     Problem Solving Hypoglycemia Management:  What are signs and symptoms of hypoglycemia that you experience?  Pt reported being unaware of s/s of hypoglycemia    How do you prevent hypoglycemia? Pt reported being unaware of how to prevent hypoglycemia    How do you treat hypoglycemia? Pt reported being unaware of how to treat hypoglycemia    Hyperglycemia Management:  What are signs and symptoms of hyperglycemia that you experience? Pt reported being unaware of s/s of hyperglycemia    How can you prevent hyperglycemia? Pt reported being unaware of how to prevent hyperglycemia    Sick Day Management:  What do you do differently on sick days? Pt reported being unaware of self-management on sick days    Pattern Management:  Do you notice blood glucose patterns when you look at the readings in your meter or logbook? Yes    How do you use the blood glucose readings from your meter or logbook? Understand how body responds to meals    Stage of change: Action   Note: Content derived from the American Association of Diabetes Educators' Diabetes Education Curriculum: A Guide to Successful Self-Management (2nd edition)         Diabetes Educator Recommendations:   - Address diabetes self-care behaviors through education and support using AADE7 Curriculum. Pt to attend weekly individual sessions on reducing risks, healthy eating, being active, monitoring, taking medications, healthy coping and problem solving.     - Patient intends to focus on these diabetes self-care practices: Healthy eating and Monitoring          Diabetes Self-Management Education Follow-up Visit: 1/12/2021 for f/u and sees Dr. Taz Harden at 2:10pm        --Petros Sanchez RD on 1/5/2021 at 1:45 PM    An electronic signature was used to authenticate this note.  I was in the office for the appointment and time spent: 55 minutes

## 2021-01-06 RX ORDER — INSULIN PUMP SYRINGE, 3 ML
EACH MISCELLANEOUS
Qty: 1 KIT | Refills: 0 | Status: SHIPPED | OUTPATIENT
Start: 2021-01-06 | End: 2021-01-12 | Stop reason: SDUPTHER

## 2021-01-06 RX ORDER — LANCETS
EACH MISCELLANEOUS
Qty: 1 EACH | Refills: 11 | Status: SHIPPED | OUTPATIENT
Start: 2021-01-06 | End: 2021-01-12 | Stop reason: SDUPTHER

## 2021-01-08 DIAGNOSIS — Z34.80 SUPERVISION OF OTHER NORMAL PREGNANCY: Primary | ICD-10-CM

## 2021-01-11 ENCOUNTER — PATIENT MESSAGE (OUTPATIENT)
Dept: OBGYN CLINIC | Age: 36
End: 2021-01-11

## 2021-01-12 ENCOUNTER — OFFICE VISIT (OUTPATIENT)
Dept: DIABETES SERVICES | Age: 36
End: 2021-01-12
Payer: MEDICAID

## 2021-01-12 ENCOUNTER — ROUTINE PRENATAL (OUTPATIENT)
Dept: OBGYN CLINIC | Age: 36
End: 2021-01-12
Payer: MEDICAID

## 2021-01-12 VITALS — DIASTOLIC BLOOD PRESSURE: 69 MMHG | BODY MASS INDEX: 32.82 KG/M2 | SYSTOLIC BLOOD PRESSURE: 119 MMHG | WEIGHT: 191.2 LBS

## 2021-01-12 DIAGNOSIS — O24.410 DIET CONTROLLED GESTATIONAL DIABETES MELLITUS (GDM) IN SECOND TRIMESTER: Primary | ICD-10-CM

## 2021-01-12 DIAGNOSIS — Z34.80 SUPERVISION OF OTHER NORMAL PREGNANCY: Primary | ICD-10-CM

## 2021-01-12 PROCEDURE — G0108 DIAB MANAGE TRN  PER INDIV: HCPCS | Performed by: DIETITIAN, REGISTERED

## 2021-01-12 PROCEDURE — 0502F SUBSEQUENT PRENATAL CARE: CPT | Performed by: OBSTETRICS & GYNECOLOGY

## 2021-01-12 RX ORDER — INSULIN PUMP SYRINGE, 3 ML
EACH MISCELLANEOUS
Qty: 1 KIT | Refills: 0 | Status: SHIPPED | OUTPATIENT
Start: 2021-01-12 | End: 2022-10-31

## 2021-01-12 RX ORDER — LANCETS
EACH MISCELLANEOUS
Qty: 1 EACH | Refills: 11 | Status: SHIPPED | OUTPATIENT
Start: 2021-01-12 | End: 2022-10-31

## 2021-01-12 NOTE — PROGRESS NOTES
[employer] [facility] for Diabetes Health  Diabetes Self-Management Education   Education and Goal Plan for GDM follow up    AADE7 Self-Care Behaviors:  Behavior Education Completed (1/12/2021)   Healthy Eating   - Recall taken     Pt still with daily pain from her gallbladder. Her appt with the surgeon was rescheduled to next week b/c he had an emergency. She is doing better if food is low fat and bland    1 pm  Breakfast; 1 egg and 1 dry toast  5 pm Baked chicken, small baked potato; small swetha bread  10 pm turkey s/w with cheese and lettuce on ww bread; no spreads  Drinking only water; no snacks due to stomach upset  Bed 1 am     Pt is a night owl. She does get up to get kids to school and then goes back to sleep till 1 pm-this is her normal routine       Being Active   - Goals for exercise     Has not  been able to be active due to the pain but needs to add aerobic activity after the pregnancy     Monitoring     - Monitoring blood glucose trends per meter     1/6/21  140, 195, 215, 220  1/7/21 150, 198, 210, 182  1/8/21 160, 202,490, 210    She is in pain and is not overeating at this time. Taking Medications - Insulin: Rapid-acting and long-acting  - Using pens  - Site rotation  - Insulin expiration  - Pt has good recall of insulin pen use from last pregnancy ; missing priming the pen and holding in skin for 10 seconds     She gave a good return demo of insulin pen use     Healthy Coping Not addressed during this session. Reducing Risks - Preventon of Type 2 DM if 2 hr GTT does not show Type 2   If Type 2 diabetes encouraged to get a PCP asap and to continue her DM education with us to ensure good self care     Problem Solving - Hypoglycemia signs/symptoms  - Hypoglycemia prevention  - Hypoglycemia treatment: Rule of 15     Pt has juice at home but worked on items to use for low BS if not at home.      Note: Content derived from the American Association of Diabetes Educators' Diabetes Education Curriculum: A Guide to Successful Self-Management (2nd edition)         Diabetes Educator Recommendations:     - Continue practicing knowledge and skills relating to healthy eating and monitoring to improve diabetes self-management. - Patient's Identified SMART Goal(s): - Follow GDM meal plan as able with GB issues     - Pt to follow up with provider on the following: to see endo tomorrow for initiation of insulin     Diabetes Self-Management Education Follow-up Visit: TBS if issues remain after she sees endo and insulin started and gall bladder issues not resolved      --Sarkis Cabrales RD on 1/12/2021 at 1:42 PM    An electronic signature was used to authenticate this note.  I was in the office for the appointment and time spent: 42 minutes

## 2021-01-12 NOTE — PROGRESS NOTES
Pt doing well, see prenatal flowsheet.   Endocrine appt tomorrow, did not  meter, so prescriptions printed and handed to pt.  msafp today

## 2021-01-13 ENCOUNTER — OFFICE VISIT (OUTPATIENT)
Dept: ENDOCRINOLOGY | Age: 36
End: 2021-01-13
Payer: MEDICAID

## 2021-01-13 VITALS
DIASTOLIC BLOOD PRESSURE: 73 MMHG | HEIGHT: 64 IN | HEART RATE: 82 BPM | TEMPERATURE: 96.9 F | SYSTOLIC BLOOD PRESSURE: 118 MMHG | RESPIRATION RATE: 18 BRPM | WEIGHT: 191 LBS | BODY MASS INDEX: 32.61 KG/M2 | OXYGEN SATURATION: 98 %

## 2021-01-13 DIAGNOSIS — O24.414 INSULIN CONTROLLED GESTATIONAL DIABETES MELLITUS (GDM) IN SECOND TRIMESTER: Primary | ICD-10-CM

## 2021-01-13 DIAGNOSIS — Z34.80 SUPERVISION OF OTHER NORMAL PREGNANCY: ICD-10-CM

## 2021-01-13 DIAGNOSIS — Z79.4 ENCOUNTER FOR LONG-TERM (CURRENT) USE OF INSULIN (HCC): ICD-10-CM

## 2021-01-13 LAB — HBA1C MFR BLD HPLC: 5.6 %

## 2021-01-13 PROCEDURE — 99205 OFFICE O/P NEW HI 60 MIN: CPT | Performed by: INTERNAL MEDICINE

## 2021-01-13 PROCEDURE — 83036 HEMOGLOBIN GLYCOSYLATED A1C: CPT | Performed by: INTERNAL MEDICINE

## 2021-01-13 RX ORDER — LANCETS
EACH MISCELLANEOUS
Qty: 250 EACH | Refills: 6 | Status: SHIPPED | OUTPATIENT
Start: 2021-01-13

## 2021-01-13 RX ORDER — CALCIUM CITRATE/VITAMIN D3 200MG-6.25
TABLET ORAL
Qty: 250 STRIP | Refills: 6 | Status: SHIPPED | OUTPATIENT
Start: 2021-01-13 | End: 2021-01-29 | Stop reason: SDUPTHER

## 2021-01-13 RX ORDER — INSULIN ASPART 100 [IU]/ML
INJECTION, SOLUTION INTRAVENOUS; SUBCUTANEOUS
Qty: 15 ML | Refills: 6 | Status: SHIPPED | OUTPATIENT
Start: 2021-01-13 | End: 2021-01-25 | Stop reason: SDUPTHER

## 2021-01-13 RX ORDER — PEN NEEDLE, DIABETIC 31 GX3/16"
NEEDLE, DISPOSABLE MISCELLANEOUS
Qty: 100 PEN NEEDLE | Refills: 6 | Status: SHIPPED | OUTPATIENT
Start: 2021-01-13 | End: 2021-02-10 | Stop reason: SDUPTHER

## 2021-01-13 RX ORDER — BLOOD-GLUCOSE METER
EACH MISCELLANEOUS
Qty: 1 EACH | Refills: 0 | Status: SHIPPED | OUTPATIENT
Start: 2021-01-13 | End: 2022-10-31

## 2021-01-13 NOTE — PROGRESS NOTES
1. Have you been to the ER, urgent care clinic since your last visit. yes-st. [facility]-gallbladder, January, 2021 Hospitalized since your last visit?no. 2. Have you seen or consulted any other health care providers outside of the [address] [place] [address] since your last visit? Include any pap smears or colon screening.  No    Wt Readings from Last 3 Encounters:   01/13/21 191 lb (86.6 kg)   01/12/21 191 lb 3.2 oz (86.7 kg)   12/28/20 189 lb 9.6 oz (86 kg)     Temp Readings from Last 3 Encounters:   01/13/21 96.9 °F (36.1 °C) (Oral)   12/28/20 98.2 °F (36.8 °C)   12/05/20 98 °F (36.7 °C)     BP Readings from Last 3 Encounters:   01/13/21 118/73   01/12/21 119/69   12/28/20 (!) 106/51     Pulse Readings from Last 3 Encounters:   01/13/21 82   12/28/20 74   12/05/20 95     No results found for: HBA1C, HGBE8, [id number], [id number]

## 2021-01-13 NOTE — PATIENT INSTRUCTIONS
SPECIFIC INSTRUCTIONS BELOW [address] water Do not skip meals Do not eat in between meals Reduce carbs- pasta, rice, potatoes, bread Try to avoid processed bread products like BISCUITS, CROISSANTS, MUFFINS Do not drink juices or sodas, even if they are calorie zero or diet drinks and especially avoid using powders like crystalloids Do not eat peanut butter Do not eat sugar free cookies and cakes Do not eat peaches, oranges, pineapples, raisins, grapes , canteloupe , honey dew, mangoes , watermelon  and fruit medleys 
 
------------------------------------------------------------------------------------------------- 
  
TALK TO OB ABOUT METFORMIN Check blood sugars immediately before each meal and at bedtime AND  2 HR POST MEALS , Take  LEVEMIR   insulin 18 units  at bed time  (  NPH  INSULIN ) Take NOVOLOG  insulin  3  units before breakfast, 3 units before lunch and 0 units before dinner. Also, add additional NOVOLOG  as follows BEFORE  meals,   If blood sugars are[de-identified] 
 
120 TO  150  MG  1 UNIT  
 
150-200 mg 2 units 201-250 mg 3 units 251-300 mg 4 units 301-350 mg 5 units Less than 70 mg NO INSULIN 
 
PAY ATTENTION TO THESE GENERAL INSTRUCTIONS  
 
- HEALTH MAINTENANCE IS NOT GOING TO BE UP TO DATE ON YOUR AVS- PLEASE IGNORE  
- YOUR MED LIST IS NOT UP TO DATE AS SOME CHANGES ARE BEING MADE AFTER THE VISIT - FOLLOW SPECIFIC INSTRUCTIONS ABOVE Results *Normal results will not be notified by a phone call starting January 1 2021 *If you have an upcoming visit, the results will be discussed at the visit *Please sign up for MY CHART if you want access to your lab and test results *Abnormal results which require immediate attention will be notified by phone call *Abnormal results which do not require immediate assistance will be notified in 1-2 weeks Refills    -    have your pharmacy send us a refill request 
 Phone calls  -  Allow  24 hrs. for non-urgent calls to be returned Prior authorization - It may take 2-4 weeks to process Forms  -  FMLA, DMV etc., will take up to 2 weeks to process Cancellations - please notify the office 2 days in advance Samples  - will only be dispensed at visits  
 
--------------------------------------------------------------------------------------------

## 2021-01-13 NOTE — PROGRESS NOTES
[facility]  Burak Dorsey MD, [facility] Missed is a 38 y.o. female presents today as a new DM Patient. HPI    Patient here for initial visit of Type 2 diabetes mellitus, Gestational    Referred : by Dr. Darcie Guzman -      She is thru 16 weeks of gestational age     - had to take insulin  ,  18 mths  p4- diet controlled , 10 yrs  p3- no glucose intolerance  11  p2- 12 and p1 15     FTND     She never  Had any check done in between pregnancies  3  and  one   Patient was called by her OB yesterday and she was asked to start checking sugars more often   Current monitoring regimen: home blood tests - daily and  - but she was using the parents' meter for checks  Home blood sugar records: trend: UNKNOWN   FROM PATIENT'S MEMORY  - FBG  - 140TO 160   AFTER MEALS 180 -210       Weight trend: stable  Prior visit with dietician: yes - diabetes educator   Current diet: \"unhealthy\" diet in general  Current exercise: no regular exercise    Known diabetic complications: none  Cardiovascular risk factors: dyslipidemia, diabetes mellitus  Eye exam current (within one year): no      History reviewed. No pertinent past medical history. History reviewed. No pertinent surgical history. Current Outpatient Medications   Medication Sig    Blood-Glucose Meter monitoring kit Please dispense insurance preferred meter Use PRN to check blood sugars    lancets misc Use 4 times daily to check blood sugar please dispense insurance preferred    glucose blood VI test strips (ASCENSIA AUTODISC VI, ONE TOUCH ULTRA TEST VI) strip Use 4 times daily to check blood sugar please dispense insurance preferred    aspirin delayed-release 81 mg tablet Take 81 mg by mouth daily.  -iron-FA-om-3s-dha-epa (Vitafol Gummies) 3.33 mg iron- 0.33 mg chew Take 1 Tab by mouth daily.  doxylamine-pyridoxine, vit B6, (Diclegis) 10-10 mg TbEC DR tablet Take 2 tablets by mouth nightly.   May add 1 tab in the morning and 1 tab in the afternoon. 4 tabs max daily. #120 tabs for 30 days    ondansetron (Zofran ODT) 8 mg disintegrating tablet Take 1 Tab by mouth every eight (8) hours as needed for Nausea. No current facility-administered medications for this visit. Review of Systems   Constitutional: Negative. HENT: Negative. Eyes: Negative. Respiratory: Negative. Cardiovascular: Negative. Gastrointestinal: Negative. Genitourinary: Negative. Musculoskeletal: Negative. Skin: Negative. Neurological: Negative. Endo/Heme/Allergies: Negative. Psychiatric/Behavioral: Negative. Vitals:    01/13/21 1314   BP: 118/73   BP 1 Location: Right arm   BP Patient Position: Sitting   Pulse: 82   Resp: 18   Temp: 96.9 °F (36.1 °C)   TempSrc: Oral   SpO2: 98%   Weight: 191 lb (86.6 kg)   Height: 5' 4\" (1.626 m)        Physical Exam  Constitutional:       Appearance: She is well-developed. HENT:      Head: Normocephalic. Eyes:      Conjunctiva/sclera: Conjunctivae normal.      Pupils: Pupils are equal, round, and reactive to light. Neck:      Musculoskeletal: Normal range of motion and neck supple. Cardiovascular:      Rate and Rhythm: Normal rate and regular rhythm. Pulmonary:      Effort: Pulmonary effort is normal.      Breath sounds: Normal breath sounds. Abdominal:      General: Bowel sounds are normal.      Palpations: Abdomen is soft. Musculoskeletal: Normal range of motion. Skin:     General: Skin is warm and dry. Neurological:      Mental Status: She is alert and oriented to person, place, and time. Deep Tendon Reflexes: Reflexes are normal and symmetric.           Reviewed the notes from the OB        Assessment and plan      1. diabetes , GESTATIONAL   :  A1C TODAY BY poc IS 5.6 %  +- 0.4 %  = 6  %    SHE WOULD HAVE BEEN PREDIABETIC BEFORE PREGNANCY ,   She is 16 weeks into GA    Reviewed the glucose log : fasting : over 120 by her memory   Discussed patho-physiology of DM  during pregnancy       Started her on basal bolus regimen given her history and her inclination to have good glycemic control ASAP    She is educated about the importance of glycemic control for healthy baby and safe delivery. She is told to check blood sugars before meals and one hour after meals and at bed time on some days (2 hours post dinner). She is educated about the targets being different during pregnancy   Fasting below 90 mg and one hour post prandial being below 130 mg     She attended  the DTC education class. She is told to upload her log/meter for review. She will call office if blood sugars are staying about the target range for 2 consecutive days   Patient is advised to check blood sugars 6  times daily by rotation method. reviewed medications and side effects in detail  lab results and schedule of future lab studies reviewed with patient        2. Hypoglycemia :  Educated on treating the hypoglycemia.              > 50 % of time is spent on counseling   Patient voiced understanding her plan of care

## 2021-01-18 ENCOUNTER — OFFICE VISIT (OUTPATIENT)
Dept: SURGERY | Age: 36
End: 2021-01-18
Payer: MEDICAID

## 2021-01-18 VITALS
SYSTOLIC BLOOD PRESSURE: 125 MMHG | HEART RATE: 83 BPM | TEMPERATURE: 97.5 F | WEIGHT: 192.5 LBS | HEIGHT: 64 IN | OXYGEN SATURATION: 99 % | DIASTOLIC BLOOD PRESSURE: 72 MMHG | BODY MASS INDEX: 32.87 KG/M2

## 2021-01-18 DIAGNOSIS — Z3A.17 17 WEEKS GESTATION OF PREGNANCY: ICD-10-CM

## 2021-01-18 DIAGNOSIS — K80.20 GALLSTONES: Primary | ICD-10-CM

## 2021-01-18 PROCEDURE — 99204 OFFICE O/P NEW MOD 45 MIN: CPT | Performed by: SURGERY

## 2021-01-18 NOTE — PROGRESS NOTES
To: Jayant Petersen MD, Ana Lilia Carter MD    From: Gilma Florez MD    Thank you for sending [facility] Miss[facility] to see us. Please note that this dictation was completed with [employer], the computer voice recognition software. Quite often unanticipated grammatical, syntax, homophones, and other interpretive errors are inadvertently transcribed by the software. Please disregard these errors. Please excuse any errors that have escaped final proofreading. Encounter Date: 2021  History and Physical    Assessment:   Symptomatic cholelithiasis. She has had several severe attacks. She is 17 weeks pregnant. It is affecting her ability to eat. She has pregnancy-induced diabetes. Body mass index is 33.04 kg/m². Plan/Recommendations/Medical Decision Making: We discussed the risks and benefits of cholecystectomy especially in relation to the fetus. We discussed the possibility of spontaneous . We discussed that the second trimester tends to be the safest time to operate if absolutely necessary. We also discussed the implications of not being able to get adequate nutrition due to the gallbladder attacks. I explained to her that we could try to wait things out until after she delivers. I did leave it to her, however, stating that only she can make the final decision about whether to undergo surgery during pregnancy. She feels that she is not going to have a healthy pregnancy if these attacks continue and she would like to have surgery during the second trimester which she is in right now. HPI:   Patient is a 38 y.o. female who is referred for gallstones and attacks of abdominal pain. She tells me that she is 17 weeks pregnant. She had her first attack on 2020. She started says it starts in the lower back and then comes up and around to the upper abdomen. She has had 5 prior spontaneous vaginal deliveries and never had attacks during any of them.   She says the attacks have become quite severe and lasted about 40 minutes to an hour. She says that it has occurred several times over the last month. She is okay if she does not eat. She vomits during the attacks. Fortunately she has maintained her weight. No fever or chills. No history of jaundice. Past Medical History:   Diagnosis Date    Diabetes [facility])       History reviewed. No pertinent surgical history. Social History     Tobacco Use    Smoking status: Never Smoker    Smokeless tobacco: Never Used   Substance Use Topics    Alcohol use: Never     Frequency: Never      Family History   Problem Relation Age of Onset    Hypertension Mother        Current Outpatient Medications   Medication Sig    insulin detemir U-100 (LEVEMIR FLEXTOUCH) 100 unit/mL (3 mL) inpn Inject 18 units at bedtime.  insulin aspart U-100 (NOVOLOG) 100 unit/mL (3 mL) inpn Inject 3 units before breakfast and lunch. Plus sliding scale to max 21 units daily.  Insulin Needles, Disposable, 32 gauge x 5/32\" ndle Use to inject insulin four times daily. Dx. Code E11.65    glucose blood VI test strips (True Metrix Glucose Test Strip) strip Use to check blood sugars eight times daily. Dx. Code E11.65    Blood-Glucose Meter (True Metrix Glucose Meter) misc Use to check blood sugars eight times daily. Dx. Code E11.65    lancets misc Use to check blood sugars eight times daily. Dx. Code E11.65    Blood-Glucose Meter monitoring kit Please dispense insurance preferred meter Use PRN to check blood sugars    lancets misc Use 4 times daily to check blood sugar please dispense insurance preferred    glucose blood VI test strips (ASCENSIA AUTODISC VI, ONE TOUCH ULTRA TEST VI) strip Use 4 times daily to check blood sugar please dispense insurance preferred    aspirin delayed-release 81 mg tablet Take 81 mg by mouth daily.  -iron-FA-om-3s-dha-epa (Vitafol Gummies) 3.33 mg iron- 0.33 mg chew Take 1 Tab by mouth daily.     doxylamine-pyridoxine, vit B6, (Diclegis) 10-10 mg TbEC DR tablet Take 2 tablets by mouth nightly. May add 1 tab in the morning and 1 tab in the afternoon. 4 tabs max daily. #120 tabs for 30 days    ondansetron (Zofran ODT) 8 mg disintegrating tablet Take 1 Tab by mouth every eight (8) hours as needed for Nausea. No current facility-administered medications for this visit. Allergies:  No Known Allergies     Review of Systems:  10 systems reviewed. See scanned sheet in \"Media\" section. See HPI for pertinent positives and negatives. Objective:     Visit Vitals  /72   Pulse 83   Temp 97.5 °F (36.4 °C)   Ht 5' 4\" (1.626 m)   Wt 87.3 kg (192 lb 8 oz)   LMP 09/07/2020 (Exact Date)   SpO2 99%   BMI 33.04 kg/m²     General appearance  Alert, cooperative, no distress, appears stated age   [de-identified]  Anicteric   Neck Supple   Back   No CVA tenderness   Lungs   CTAB   Heart  Regular rate and rhythm. Abdomen   Soft. Bowel sounds normal. No palpable masses. TTP RUQ mildly. Gravid with uterus reaching just below the umbilicus. Extremities No CCE.    Pulses 2+ right radial   Skin Skin color, texture, turgor normal.        Neurologic Without overt sensory or motor deficit     Imaging and Lab Review:   images and reports reviewed    Signed By: Juan C Tinsley MD     January 18, 2021

## 2021-01-18 NOTE — H&P (VIEW-ONLY)
To: Marissa Elmore MD, Juanita Sanabria MD 
 
From: Bina Caldwell MD 
 
Thank you for sending [facility] Missed to see us. Please note that this dictation was completed with [employer], the computer voice recognition software. Quite often unanticipated grammatical, syntax, homophones, and other interpretive errors are inadvertently transcribed by the software. Please disregard these errors. Please excuse any errors that have escaped final proofreading. Encounter Date: 2021 History and Physical 
 
Assessment:  
Symptomatic cholelithiasis. She has had several severe attacks. She is 17 weeks pregnant. It is affecting her ability to eat. She has pregnancy-induced diabetes. Body mass index is 33.04 kg/m². Plan/Recommendations/Medical Decision Making: We discussed the risks and benefits of cholecystectomy especially in relation to the fetus. We discussed the possibility of spontaneous . We discussed that the second trimester tends to be the safest time to operate if absolutely necessary. We also discussed the implications of not being able to get adequate nutrition due to the gallbladder attacks. I explained to her that we could try to wait things out until after she delivers. I did leave it to her, however, stating that only she can make the final decision about whether to undergo surgery during pregnancy. She feels that she is not going to have a healthy pregnancy if these attacks continue and she would like to have surgery during the second trimester which she is in right now.  
 
HPI:  
 Patient is a 38 y.o. female who is referred for gallstones and attacks of abdominal pain. She tells me that she is 17 weeks pregnant. She had her first attack on December 5, 2020. She started says it starts in the lower back and then comes up and around to the upper abdomen. She has had 5 prior spontaneous vaginal deliveries and never had attacks during any of them. She says the attacks have become quite severe and lasted about 40 minutes to an hour. She says that it has occurred several times over the last month. She is okay if she does not eat. She vomits during the attacks. Fortunately she has maintained her weight. No fever or chills. No history of jaundice. Past Medical History:  
Diagnosis Date  Diabetes ([facility] [address] History reviewed. No pertinent surgical history. Social History Tobacco Use  Smoking status: Never Smoker  Smokeless tobacco: Never Used Substance Use Topics  Alcohol use: Never Frequency: Never Family History Problem Relation Age of Onset  Hypertension Mother Current Outpatient Medications Medication Sig  
 insulin detemir U-100 (LEVEMIR FLEXTOUCH) 100 unit/mL (3 mL) inpn Inject 18 units at bedtime.  insulin aspart U-100 (NOVOLOG) 100 unit/mL (3 mL) inpn Inject 3 units before breakfast and lunch. Plus sliding scale to max 21 units daily.  Insulin Needles, Disposable, 32 gauge x 5/32\" ndle Use to inject insulin four times daily. Dx. Code E11.65  
 glucose blood VI test strips (True Metrix Glucose Test Strip) strip Use to check blood sugars eight times daily. Dx. Code E11.65  Blood-Glucose Meter (True Metrix Glucose Meter) misc Use to check blood sugars eight times daily. Dx. Code E11.65  
 lancets misc Use to check blood sugars eight times daily. Dx. Code E11.65  Blood-Glucose Meter monitoring kit Please dispense insurance preferred meter Use PRN to check blood sugars  lancets misc Use 4 times daily to check blood sugar please dispense insurance preferred  glucose blood VI test strips (ASCENSIA AUTODISC VI, ONE TOUCH ULTRA TEST VI) strip Use 4 times daily to check blood sugar please dispense insurance preferred  aspirin delayed-release 81 mg tablet Take 81 mg by mouth daily.  -iron-FA-om-3s-dha-epa (Vitafol Gummies) 3.33 mg iron- 0.33 mg chew Take 1 Tab by mouth daily.  doxylamine-pyridoxine, vit B6, (Diclegis) 10-10 mg TbEC DR tablet Take 2 tablets by mouth nightly. May add 1 tab in the morning and 1 tab in the afternoon. 4 tabs max daily. #120 tabs for 30 days  ondansetron (Zofran ODT) 8 mg disintegrating tablet Take 1 Tab by mouth every eight (8) hours as needed for Nausea. No current facility-administered medications for this visit. Allergies: 
No Known Allergies Review of Systems:  10 systems reviewed. See scanned sheet in \"Media\" section. See HPI for pertinent positives and negatives. Objective:  
 
Visit Vitals /72 Pulse 83 Temp 97.5 °F (36.4 °C) Ht 5' 4\" (1.626 m) Wt 87.3 kg (192 lb 8 oz) LMP 09/07/2020 (Exact Date) SpO2 99% BMI 33.04 kg/m² General appearance  Alert, cooperative, no distress, appears stated age HEENT  Anicteric Neck Supple Back   No CVA tenderness Lungs   CTAB Heart  Regular rate and rhythm. Abdomen   Soft. Bowel sounds normal. No palpable masses. TTP RUQ mildly. Gravid with uterus reaching just below the umbilicus. Extremities No CCE. Pulses 2+ right radial  
Skin Skin color, texture, turgor normal.   
   
Neurologic Without overt sensory or motor deficit Imaging and Lab Review:  
images and reports reviewed Signed By: Mell Monroy MD   
 January 18, 2021

## 2021-01-18 NOTE — PROGRESS NOTES
Chief Complaint   Patient presents with    Abdominal Pain     seen at the request of Dr. Chantal samuel gallbladder     Discussed advanced directive. Patient states that she does not have an advanced directive.

## 2021-01-19 ENCOUNTER — DOCUMENTATION ONLY (OUTPATIENT)
Dept: ENDOCRINOLOGY | Age: 36
End: 2021-01-19

## 2021-01-19 NOTE — PROGRESS NOTES
Call patient and remind her to upload her  Sugar numbers if she is on my chart and she is on it     Or     Drop off her log     She is pregnant and I told her to be good with log info       Aj Hendrickson MD

## 2021-01-22 ENCOUNTER — TELEPHONE (OUTPATIENT)
Dept: SURGERY | Age: 36
End: 2021-01-22

## 2021-01-22 NOTE — TELEPHONE ENCOUNTER
Called patient to discuss her wishes to proceed with cholecystectomy. She is presently 17 weeks pregnant and she understands that the second trimester is the best time for surgery if surgery is absolutely necessary. We discussed the risks including spontaneous . She feels that she cannot wait until after she delivers and wishes to proceed. I will have my office call her Monday morning to schedule for this coming . I told her to expect that they will have her come in Monday to PAT for Covid testing. We will also need to make arrangements with her obstetrician so that we can have fetal heart tones checked in preop and and postop.

## 2021-01-25 RX ORDER — INSULIN ASPART 100 [IU]/ML
INJECTION, SOLUTION INTRAVENOUS; SUBCUTANEOUS
Qty: 15 ML | Refills: 6
Start: 2021-01-25 | End: 2021-02-10 | Stop reason: SDUPTHER

## 2021-01-25 NOTE — PERIOP NOTES
[facility]  Preoperative Instructions        Surgery Date 1/28/21          Time of Arrival 1200    1. On the day of your surgery, please report to the Surgical Services Registration Desk and sign in at your designated time. The Surgery Center is located to the right of the Emergency Room. 2. You must have someone with you to drive you home. You should not drive a car for 24 hours following surgery. Please make arrangements for a friend or family member to stay with you for the first 24 hours after your surgery. 3. Do not have anything to eat or drink (including water, gum, mints, coffee, juice) after midnight ?1/27/21? Cherl Spinner ? This may not apply to medications prescribed by your physician. ?(Please note below the special instructions with medications to take the morning of your procedure.)    4. We recommend you do not drink any alcoholic beverages for 24 hours before and after your surgery. 5. Contact your surgeons office for instructions on the following medications: non-steroidal anti-inflammatory drugs (i.e. Advil, Aleve), vitamins, and supplements. (Some surgeons will want you to stop these medications prior to surgery and others may allow you to take them)  **If you are currently taking Plavix, Coumadin, Aspirin and/or other blood-thinning agents, contact your surgeon for instructions. ** Your surgeon will partner with the physician prescribing these medications to determine if it is safe to stop or if you need to continue taking. Please do not stop taking these medications without instructions from your surgeon    6. Wear comfortable clothes. Wear glasses instead of contacts. Do not bring any money or jewelry. Please bring picture ID, insurance card, and any prearranged co-payment or hospital payment. Do not wear make-up, particularly mascara the morning of your surgery. Do not wear nail polish, particularly if you are having foot /hand surgery.   Wear your hair loose or down, no ponytails, buns, elpidio pins or clips. All body piercings must be removed. Please shower with antibacterial soap for three consecutive days before and on the morning of surgery, but do not apply any lotions, powders or deodorants after the shower on the day of surgery. Please use a fresh towels after each shower. Please sleep in clean clothes and change bed linens the night before surgery. Please do not shave for 48 hours prior to surgery. Shaving of the face is acceptable. 7. You should understand that if you do not follow these instructions your surgery may be cancelled. If your physical condition changes (I.e. fever, cold or flu) please contact your surgeon as soon as possible. 8. It is important that you be on time. If a situation occurs where you may be late, please call [phone] (OR Holding Area). 9. If you have any questions and or problems, please call [phone] (Pre-admission Testing). 10. Your surgery time may be subject to change. You will receive a phone call the evening prior if your time changes. 11.  If having outpatient surgery, you must have someone to drive you here, stay with you during the duration of your stay, and to drive you home at time of discharge. Special Instructions:     TAKE ALL MEDICATIONS DAY OF SURGERY EXCEPT:  Aspirin, vitamins, aspart insulin    I understand a pre-operative phone call will be made to verify my surgery time. In the event that I am not available, I give permission for a message to be left on my answering service and/or with another person?   Yes [phone]         ___________________      __________   _________    (Signature of Patient)             (Witness)                (Date and Time)

## 2021-01-28 ENCOUNTER — ANESTHESIA (OUTPATIENT)
Dept: SURGERY | Age: 36
End: 2021-01-28
Payer: MEDICAID

## 2021-01-28 ENCOUNTER — HOSPITAL ENCOUNTER (OUTPATIENT)
Age: 36
Setting detail: OUTPATIENT SURGERY
Discharge: HOME | End: 2021-01-28
Attending: SURGERY | Admitting: SURGERY
Payer: MEDICAID

## 2021-01-28 ENCOUNTER — ANESTHESIA EVENT (OUTPATIENT)
Dept: SURGERY | Age: 36
End: 2021-01-28
Payer: MEDICAID

## 2021-01-28 ENCOUNTER — APPOINTMENT (OUTPATIENT)
Dept: GENERAL RADIOLOGY | Age: 36
End: 2021-01-28
Attending: SURGERY
Payer: MEDICAID

## 2021-01-28 VITALS
BODY MASS INDEX: 33.08 KG/M2 | HEIGHT: 64 IN | HEART RATE: 97 BPM | RESPIRATION RATE: 19 BRPM | OXYGEN SATURATION: 95 % | TEMPERATURE: 98 F | DIASTOLIC BLOOD PRESSURE: 67 MMHG | SYSTOLIC BLOOD PRESSURE: 122 MMHG | WEIGHT: 193.78 LBS

## 2021-01-28 DIAGNOSIS — K80.20 GALLSTONES: Primary | ICD-10-CM

## 2021-01-28 LAB
ALBUMIN SERPL-MCNC: 3.3 G/DL (ref 3.5–5)
ALBUMIN/GLOB SERPL: 0.8 {RATIO} (ref 1.1–2.2)
ALP SERPL-CCNC: 66 U/L (ref 45–117)
ALT SERPL-CCNC: 12 U/L (ref 12–78)
ANION GAP SERPL CALC-SCNC: 7 MMOL/L (ref 5–15)
AST SERPL-CCNC: 12 U/L (ref 15–37)
BILIRUB SERPL-MCNC: 0.6 MG/DL (ref 0.2–1)
BUN SERPL-MCNC: 6 MG/DL (ref 6–20)
BUN/CREAT SERPL: 14 (ref 12–20)
CALCIUM SERPL-MCNC: 8.5 MG/DL (ref 8.5–10.1)
CHLORIDE SERPL-SCNC: 108 MMOL/L (ref 97–108)
CO2 SERPL-SCNC: 20 MMOL/L (ref 21–32)
CREAT SERPL-MCNC: 0.43 MG/DL (ref 0.55–1.02)
ERYTHROCYTE [DISTWIDTH] IN BLOOD BY AUTOMATED COUNT: 13.3 % (ref 11.5–14.5)
GLOBULIN SER CALC-MCNC: 4.2 G/DL (ref 2–4)
GLUCOSE BLD STRIP.AUTO-MCNC: 139 MG/DL (ref 65–100)
GLUCOSE BLD STRIP.AUTO-MCNC: 145 MG/DL (ref 65–100)
GLUCOSE SERPL-MCNC: 140 MG/DL (ref 65–100)
HCT VFR BLD AUTO: 35.3 % (ref 35–47)
HGB BLD-MCNC: 11.4 G/DL (ref 11.5–16)
MCH RBC QN AUTO: 26.4 PG (ref 26–34)
MCHC RBC AUTO-ENTMCNC: 32.3 G/DL (ref 30–36.5)
MCV RBC AUTO: 81.7 FL (ref 80–99)
NRBC # BLD: 0 K/UL (ref 0–0.01)
NRBC BLD-RTO: 0 PER 100 WBC
PLATELET # BLD AUTO: 242 K/UL (ref 150–400)
PMV BLD AUTO: 10.7 FL (ref 8.9–12.9)
POTASSIUM SERPL-SCNC: 3.8 MMOL/L (ref 3.5–5.1)
PROT SERPL-MCNC: 7.5 G/DL (ref 6.4–8.2)
RBC # BLD AUTO: 4.32 M/UL (ref 3.8–5.2)
SERVICE CMNT-IMP: ABNORMAL
SERVICE CMNT-IMP: ABNORMAL
SODIUM SERPL-SCNC: 135 MMOL/L (ref 136–145)
WBC # BLD AUTO: 5.9 K/UL (ref 3.6–11)

## 2021-01-28 PROCEDURE — 74011250636 HC RX REV CODE- 250/636: Performed by: ANESTHESIOLOGY

## 2021-01-28 PROCEDURE — 74011000250 HC RX REV CODE- 250: Performed by: SURGERY

## 2021-01-28 PROCEDURE — 47562 LAPAROSCOPIC CHOLECYSTECTOMY: CPT | Performed by: SURGERY

## 2021-01-28 PROCEDURE — 74011250636 HC RX REV CODE- 250/636: Performed by: NURSE ANESTHETIST, CERTIFIED REGISTERED

## 2021-01-28 PROCEDURE — 76010000138 HC OR TIME 0.5 TO 1 HR: Performed by: SURGERY

## 2021-01-28 PROCEDURE — 77030008756 HC TU IRR SUC STRY -B: Performed by: SURGERY

## 2021-01-28 PROCEDURE — 76060000032 HC ANESTHESIA 0.5 TO 1 HR: Performed by: SURGERY

## 2021-01-28 PROCEDURE — 77030018875 HC APPL CLP LIG4 J&J -B: Performed by: SURGERY

## 2021-01-28 PROCEDURE — 77030020829: Performed by: SURGERY

## 2021-01-28 PROCEDURE — 2709999900 HC NON-CHARGEABLE SUPPLY: Performed by: SURGERY

## 2021-01-28 PROCEDURE — 85027 COMPLETE CBC AUTOMATED: CPT

## 2021-01-28 PROCEDURE — 74011000250 HC RX REV CODE- 250: Performed by: NURSE ANESTHETIST, CERTIFIED REGISTERED

## 2021-01-28 PROCEDURE — 74011250637 HC RX REV CODE- 250/637: Performed by: SURGERY

## 2021-01-28 PROCEDURE — 2709999900 HC NON-CHARGEABLE SUPPLY

## 2021-01-28 PROCEDURE — 82962 GLUCOSE BLOOD TEST: CPT

## 2021-01-28 PROCEDURE — 77030012407 HC DRN WND BARD -B: Performed by: SURGERY

## 2021-01-28 PROCEDURE — 77030008606 HC TRCR ENDOSC KII AMR -B: Performed by: SURGERY

## 2021-01-28 PROCEDURE — 77030020053 HC ELECTRD LAPSCP COVD -B: Performed by: SURGERY

## 2021-01-28 PROCEDURE — 36415 COLL VENOUS BLD VENIPUNCTURE: CPT

## 2021-01-28 PROCEDURE — 77030002933 HC SUT MCRYL J&J -A: Performed by: SURGERY

## 2021-01-28 PROCEDURE — 76210000020 HC REC RM PH II FIRST 0.5 HR: Performed by: SURGERY

## 2021-01-28 PROCEDURE — 77030012770 HC TRCR OPT FX AMR -B: Performed by: SURGERY

## 2021-01-28 PROCEDURE — 74011250636 HC RX REV CODE- 250/636: Performed by: SURGERY

## 2021-01-28 PROCEDURE — 77030040361 HC SLV COMPR DVT MDII -B: Performed by: SURGERY

## 2021-01-28 PROCEDURE — 77030009851 HC PCH RTVR ENDOSC AMR -B: Performed by: SURGERY

## 2021-01-28 PROCEDURE — 76210000016 HC OR PH I REC 1 TO 1.5 HR: Performed by: SURGERY

## 2021-01-28 PROCEDURE — 77030010507 HC ADH SKN DERMBND J&J -B: Performed by: SURGERY

## 2021-01-28 PROCEDURE — 77030013567 HC DRN WND RESERV BARD -A: Performed by: SURGERY

## 2021-01-28 PROCEDURE — 77030031139 HC SUT VCRL2 J&J -A: Performed by: SURGERY

## 2021-01-28 PROCEDURE — 77030010837 HC CATH CHOL INTRO TELE -B: Performed by: SURGERY

## 2021-01-28 PROCEDURE — 77030008771 HC TU NG SALEM SUMP -A: Performed by: NURSE ANESTHETIST, CERTIFIED REGISTERED

## 2021-01-28 PROCEDURE — 80053 COMPREHEN METABOLIC PANEL: CPT

## 2021-01-28 PROCEDURE — 77030013079 HC BLNKT BAIR HGGR 3M -A: Performed by: NURSE ANESTHETIST, CERTIFIED REGISTERED

## 2021-01-28 PROCEDURE — 88304 TISSUE EXAM BY PATHOLOGIST: CPT

## 2021-01-28 PROCEDURE — 77030008684 HC TU ET CUF COVD -B: Performed by: NURSE ANESTHETIST, CERTIFIED REGISTERED

## 2021-01-28 RX ORDER — ONDANSETRON 2 MG/ML
4 INJECTION INTRAMUSCULAR; INTRAVENOUS AS NEEDED
Status: DISCONTINUED | OUTPATIENT
Start: 2021-01-28 | End: 2021-01-28 | Stop reason: HOSPADM

## 2021-01-28 RX ORDER — PROPOFOL 10 MG/ML
INJECTION, EMULSION INTRAVENOUS AS NEEDED
Status: DISCONTINUED | OUTPATIENT
Start: 2021-01-28 | End: 2021-01-28 | Stop reason: HOSPADM

## 2021-01-28 RX ORDER — SODIUM CHLORIDE, SODIUM LACTATE, POTASSIUM CHLORIDE, CALCIUM CHLORIDE 600; 310; 30; 20 MG/100ML; MG/100ML; MG/100ML; MG/100ML
25 INJECTION, SOLUTION INTRAVENOUS CONTINUOUS
Status: DISCONTINUED | OUTPATIENT
Start: 2021-01-28 | End: 2021-01-28 | Stop reason: HOSPADM

## 2021-01-28 RX ORDER — HYDROMORPHONE HYDROCHLORIDE 1 MG/ML
0.5 INJECTION, SOLUTION INTRAMUSCULAR; INTRAVENOUS; SUBCUTANEOUS
Status: DISCONTINUED | OUTPATIENT
Start: 2021-01-28 | End: 2021-01-28 | Stop reason: HOSPADM

## 2021-01-28 RX ORDER — SODIUM CHLORIDE 0.9 % (FLUSH) 0.9 %
5-40 SYRINGE (ML) INJECTION AS NEEDED
Status: DISCONTINUED | OUTPATIENT
Start: 2021-01-28 | End: 2021-01-28 | Stop reason: HOSPADM

## 2021-01-28 RX ORDER — ONDANSETRON 2 MG/ML
INJECTION INTRAMUSCULAR; INTRAVENOUS AS NEEDED
Status: DISCONTINUED | OUTPATIENT
Start: 2021-01-28 | End: 2021-01-28 | Stop reason: HOSPADM

## 2021-01-28 RX ORDER — POLYETHYLENE GLYCOL 3350 17 G/17G
17 POWDER, FOR SOLUTION ORAL DAILY
Qty: 510 G | Refills: 0 | Status: SHIPPED | OUTPATIENT
Start: 2021-01-28

## 2021-01-28 RX ORDER — FENTANYL CITRATE 50 UG/ML
25 INJECTION, SOLUTION INTRAMUSCULAR; INTRAVENOUS
Status: DISCONTINUED | OUTPATIENT
Start: 2021-01-28 | End: 2021-01-28 | Stop reason: HOSPADM

## 2021-01-28 RX ORDER — HYDROMORPHONE HYDROCHLORIDE 2 MG/ML
INJECTION, SOLUTION INTRAMUSCULAR; INTRAVENOUS; SUBCUTANEOUS AS NEEDED
Status: DISCONTINUED | OUTPATIENT
Start: 2021-01-28 | End: 2021-01-28 | Stop reason: HOSPADM

## 2021-01-28 RX ORDER — ACETAMINOPHEN 325 MG/1
650 TABLET ORAL ONCE
Status: DISCONTINUED | OUTPATIENT
Start: 2021-01-28 | End: 2021-01-28 | Stop reason: HOSPADM

## 2021-01-28 RX ORDER — SODIUM CHLORIDE, SODIUM LACTATE, POTASSIUM CHLORIDE, CALCIUM CHLORIDE 600; 310; 30; 20 MG/100ML; MG/100ML; MG/100ML; MG/100ML
100 INJECTION, SOLUTION INTRAVENOUS CONTINUOUS
Status: DISCONTINUED | OUTPATIENT
Start: 2021-01-28 | End: 2021-01-28 | Stop reason: HOSPADM

## 2021-01-28 RX ORDER — LIDOCAINE HYDROCHLORIDE 20 MG/ML
INJECTION, SOLUTION EPIDURAL; INFILTRATION; INTRACAUDAL; PERINEURAL AS NEEDED
Status: DISCONTINUED | OUTPATIENT
Start: 2021-01-28 | End: 2021-01-28 | Stop reason: HOSPADM

## 2021-01-28 RX ORDER — SUCCINYLCHOLINE CHLORIDE 20 MG/ML
INJECTION INTRAMUSCULAR; INTRAVENOUS AS NEEDED
Status: DISCONTINUED | OUTPATIENT
Start: 2021-01-28 | End: 2021-01-28 | Stop reason: HOSPADM

## 2021-01-28 RX ORDER — HYDROCODONE BITARTRATE AND ACETAMINOPHEN 5; 325 MG/1; MG/1
1-2 TABLET ORAL
Qty: 30 TAB | Refills: 0 | Status: SHIPPED | OUTPATIENT
Start: 2021-01-28 | End: 2021-02-02

## 2021-01-28 RX ORDER — MIDAZOLAM HYDROCHLORIDE 1 MG/ML
1 INJECTION, SOLUTION INTRAMUSCULAR; INTRAVENOUS AS NEEDED
Status: DISCONTINUED | OUTPATIENT
Start: 2021-01-28 | End: 2021-01-28 | Stop reason: HOSPADM

## 2021-01-28 RX ORDER — MIDAZOLAM HYDROCHLORIDE 1 MG/ML
1 INJECTION, SOLUTION INTRAMUSCULAR; INTRAVENOUS AS NEEDED
Status: DISCONTINUED | OUTPATIENT
Start: 2021-01-28 | End: 2021-01-28 | Stop reason: SDUPTHER

## 2021-01-28 RX ORDER — DIPHENHYDRAMINE HYDROCHLORIDE 50 MG/ML
12.5 INJECTION, SOLUTION INTRAMUSCULAR; INTRAVENOUS AS NEEDED
Status: DISCONTINUED | OUTPATIENT
Start: 2021-01-28 | End: 2021-01-28 | Stop reason: HOSPADM

## 2021-01-28 RX ORDER — LIDOCAINE HYDROCHLORIDE 10 MG/ML
0.1 INJECTION, SOLUTION EPIDURAL; INFILTRATION; INTRACAUDAL; PERINEURAL AS NEEDED
Status: DISCONTINUED | OUTPATIENT
Start: 2021-01-28 | End: 2021-01-28 | Stop reason: HOSPADM

## 2021-01-28 RX ORDER — MIDAZOLAM HYDROCHLORIDE 1 MG/ML
0.5 INJECTION, SOLUTION INTRAMUSCULAR; INTRAVENOUS
Status: DISCONTINUED | OUTPATIENT
Start: 2021-01-28 | End: 2021-01-28 | Stop reason: HOSPADM

## 2021-01-28 RX ORDER — FENTANYL CITRATE 50 UG/ML
50 INJECTION, SOLUTION INTRAMUSCULAR; INTRAVENOUS AS NEEDED
Status: DISCONTINUED | OUTPATIENT
Start: 2021-01-28 | End: 2021-01-28 | Stop reason: HOSPADM

## 2021-01-28 RX ORDER — SODIUM CHLORIDE 0.9 % (FLUSH) 0.9 %
5-40 SYRINGE (ML) INJECTION EVERY 8 HOURS
Status: DISCONTINUED | OUTPATIENT
Start: 2021-01-28 | End: 2021-01-28 | Stop reason: HOSPADM

## 2021-01-28 RX ORDER — MORPHINE SULFATE 10 MG/ML
2 INJECTION, SOLUTION INTRAMUSCULAR; INTRAVENOUS
Status: DISCONTINUED | OUTPATIENT
Start: 2021-01-28 | End: 2021-01-28 | Stop reason: HOSPADM

## 2021-01-28 RX ORDER — ROPIVACAINE HYDROCHLORIDE 5 MG/ML
30 INJECTION, SOLUTION EPIDURAL; INFILTRATION; PERINEURAL AS NEEDED
Status: DISCONTINUED | OUTPATIENT
Start: 2021-01-28 | End: 2021-01-28 | Stop reason: HOSPADM

## 2021-01-28 RX ORDER — SODIUM CHLORIDE 9 MG/ML
25 INJECTION, SOLUTION INTRAVENOUS CONTINUOUS
Status: DISCONTINUED | OUTPATIENT
Start: 2021-01-28 | End: 2021-01-28 | Stop reason: HOSPADM

## 2021-01-28 RX ORDER — PHENYLEPHRINE HCL IN 0.9% NACL 0.4MG/10ML
SYRINGE (ML) INTRAVENOUS AS NEEDED
Status: DISCONTINUED | OUTPATIENT
Start: 2021-01-28 | End: 2021-01-28 | Stop reason: HOSPADM

## 2021-01-28 RX ORDER — FENTANYL CITRATE 50 UG/ML
INJECTION, SOLUTION INTRAMUSCULAR; INTRAVENOUS AS NEEDED
Status: DISCONTINUED | OUTPATIENT
Start: 2021-01-28 | End: 2021-01-28 | Stop reason: HOSPADM

## 2021-01-28 RX ORDER — BUPIVACAINE HYDROCHLORIDE AND EPINEPHRINE 5; 5 MG/ML; UG/ML
30 INJECTION, SOLUTION EPIDURAL; INTRACAUDAL; PERINEURAL ONCE
Status: COMPLETED | OUTPATIENT
Start: 2021-01-28 | End: 2021-01-28

## 2021-01-28 RX ORDER — ROCURONIUM BROMIDE 10 MG/ML
INJECTION, SOLUTION INTRAVENOUS AS NEEDED
Status: DISCONTINUED | OUTPATIENT
Start: 2021-01-28 | End: 2021-01-28 | Stop reason: HOSPADM

## 2021-01-28 RX ADMIN — HYDROMORPHONE HYDROCHLORIDE 0.4 MG: 2 INJECTION, SOLUTION INTRAMUSCULAR; INTRAVENOUS; SUBCUTANEOUS at 14:48

## 2021-01-28 RX ADMIN — HYDROMORPHONE HYDROCHLORIDE 0.4 MG: 2 INJECTION, SOLUTION INTRAMUSCULAR; INTRAVENOUS; SUBCUTANEOUS at 15:05

## 2021-01-28 RX ADMIN — LIDOCAINE HYDROCHLORIDE 60 MG: 20 INJECTION, SOLUTION EPIDURAL; INFILTRATION; INTRACAUDAL; PERINEURAL at 14:18

## 2021-01-28 RX ADMIN — Medication 80 MCG: at 13:43

## 2021-01-28 RX ADMIN — SUGAMMADEX 200 MG: 100 INJECTION, SOLUTION INTRAVENOUS at 14:53

## 2021-01-28 RX ADMIN — Medication 80 MCG: at 14:25

## 2021-01-28 RX ADMIN — Medication 80 MCG: at 14:36

## 2021-01-28 RX ADMIN — ROCURONIUM BROMIDE 25 MG: 10 INJECTION INTRAVENOUS at 14:32

## 2021-01-28 RX ADMIN — SODIUM CHLORIDE, POTASSIUM CHLORIDE, SODIUM LACTATE AND CALCIUM CHLORIDE: 600; 310; 30; 20 INJECTION, SOLUTION INTRAVENOUS at 14:39

## 2021-01-28 RX ADMIN — ROCURONIUM BROMIDE 5 MG: 10 INJECTION INTRAVENOUS at 14:18

## 2021-01-28 RX ADMIN — PROPOFOL 150 MG: 10 INJECTION, EMULSION INTRAVENOUS at 14:18

## 2021-01-28 RX ADMIN — SUCCINYLCHOLINE CHLORIDE 160 MG: 20 INJECTION, SOLUTION INTRAMUSCULAR; INTRAVENOUS at 14:18

## 2021-01-28 RX ADMIN — WATER 2 G: 1 INJECTION INTRAMUSCULAR; INTRAVENOUS; SUBCUTANEOUS at 14:28

## 2021-01-28 RX ADMIN — ONDANSETRON HYDROCHLORIDE 4 MG: 2 INJECTION, SOLUTION INTRAMUSCULAR; INTRAVENOUS at 14:36

## 2021-01-28 RX ADMIN — SODIUM CHLORIDE, POTASSIUM CHLORIDE, SODIUM LACTATE AND CALCIUM CHLORIDE 25 ML/HR: 600; 310; 30; 20 INJECTION, SOLUTION INTRAVENOUS at 13:19

## 2021-01-28 RX ADMIN — HYDROMORPHONE HYDROCHLORIDE 0.4 MG: 2 INJECTION, SOLUTION INTRAMUSCULAR; INTRAVENOUS; SUBCUTANEOUS at 15:14

## 2021-01-28 RX ADMIN — Medication 3 AMPULE: at 13:19

## 2021-01-28 RX ADMIN — HYDROMORPHONE HYDROCHLORIDE 0.4 MG: 2 INJECTION, SOLUTION INTRAMUSCULAR; INTRAVENOUS; SUBCUTANEOUS at 14:57

## 2021-01-28 RX ADMIN — FENTANYL CITRATE 100 MCG: 50 INJECTION, SOLUTION INTRAMUSCULAR; INTRAVENOUS at 14:13

## 2021-01-28 NOTE — DISCHARGE INSTRUCTIONS
Discharge Instructions:  Laparoscopic Cholecystectomy (Gallbladder Removal)  Dr. Isaak Gonzalez    Call on next business day to arrange appointment for follow up in 3 weeks -- [phone]. Activity:  Walk regularly - can walk today as tolerated, but make yourself walk at least 50 yards at least 6 times per day starting tomorrow. No lifting more than 10 -15 pounds for 4 weeks. You may resume driving in 5-7 days unless still requiring narcotics for pain. Work:  You may return to work in 1 or 2 weeks to light activity. No lifting more than 10 pounds (=\"light duty\") for four weeks. If your employer can not accommodate \"light duty,\" your employer will need to provide any necessary paperwork to our office. This document with serve as the initial \"note\" to your employer. Diet:  You may resume normal diet after 24 hours. Fatty foods may still cause some stomach upset. Avoid fatty/greasy foods for 1 month, then add back slowly. Wound Care:  Dermabond glue dressing will fall off over the next couple of weeks. It is waterproof. You may shower, but no swimming or baths for 2 weeks. Your incisions may ooze for a few days. Do not worry about this. If you experience a lot of drainage, develop redness around the wound, or a fever over 101 F occurs please call the office. Medications:  See attached \"Medication Reconciliation. \"    Resume home medications as indicated on the Medical Reconciliation form. Do not use blood thinners (such as Aspirin, Coumadin, or Plavix) until 3 days after surgery. Pain medications:  Non steroidal antiinflammatories (ibuprofen -- Advil or Motrin) seem to work best for post surgical pain. Try these first.  A narcotic prescription will also be given for breakthrough pain. Do not use Tylenol while taking the narcotic because there is Tylenol in the narcotic pill, and too much Tylenol can injure your liver.   Tylenol can be used in place of the narcotic, but do not take both at the same time. PUT ICE ON YOUR INCISION(S) 20 MINUTES EVERY HOUR WHILE AWAKE FOR THE NEXT 3 DAYS AT LEAST. PLACE A THIN CLOTH BETWEEN YOUR SKIN AND THE ICE BAG. Colace or Miralax should be used twice daily to prevent constipation while on narcotics. If you are still having trouble having a BM after 1-2 days, try milk of magnesia. If this does not work within 24 hours, try a bottle of magnesium citrate. If this does not work, call us. Narcotics and anesthesia sometimes cause nausea and vomiting. If persistent please call the office. Do not hesitate to call with questions or concerns. Santhohs Lyles MD  [facility]. Tel. [phone]  Fax [phone]   TO PREVENT AN INFECTION      1. [address] YOUR HANDS     To prevent infection, good handwashing is the most important thing you or your caregiver can do.  Wash your hands with soap and water or use the hand  we gave you before you touch any wounds. 2. SHOWER     Use the antibacterial soap we gave you when you take a shower.  Shower with this soap until your wounds are healed.  To reach all areas of your body, you may need someone to help you.  Dont forget to clean your belly button with every shower. 3.  USE CLEAN SHEETS     Use freshly cleaned sheets on your bed after surgery.  To keep the surgery site clean, do not allow pets to sleep with you while your wound is still healing. 4. STOP SMOKING     Stop smoking, or at least cut back on smoking     Smoking slows your healing. 5.  CONTROL YOUR BLOOD SUGAR     High blood sugars slow wound healing. If you are diabetic, control your blood sugar levels before and after your surgery. How to Care for Your Wound After Its Treated With  DERMABOND* Topical Skin Adhesive  DERMABOND* Topical Skin Adhesive (2-octyl cyanoacrylate) is a sterile, liquid skin adhesive  that holds wound edges together.  The film will usually remain in place for 5 to 10 days, then  naturally fall off your skin. The following will answer some of your questions and provide instructions for proper care for your  wound while it is healing:    CHECK WOUND APPEARANCE   Some swelling, redness, and pain are common with all wounds and normally will go away as the  wound heals. If swelling, redness, or pain increases or if the wound feels warm to the touch,  contact a doctor. Also contact a doctor if the wound edges reopen or separate. REPLACE BANDAGES   If your wound is bandaged, keep the bandage dry.  Replace the dressing daily until the adhesive film has fallen off or if the  bandage should become wet, unless otherwise instructed by your  physician.  When changing the dressing, do not place tape directly over the  DERMABOND adhesive film, because removing the tape later may also  remove the film. AVOID TOPICAL MEDICATIONS   Do not apply liquid or ointment medications or any other product to your wound while the  DERMABOND adhesive film is in place. These may loosen the film before your wound is healed. KEEP WOUND DRY AND PROTECTED   You may occasionally and briefly wet your wound in the shower or bath. Do not soak or scrub  your wound, do not swim, and avoid periods of heavy perspiration until the DERMABOND  adhesive has naturally fallen off. After showering or bathing, gently blot your wound dry with a  soft towel. If a protective dressing is being used, apply a fresh, dry bandage, being sure to keep  the tape off the DERMABOND adhesive film.  Apply a clean, dry bandage over the wound if necessary to protect it.  Protect your wound from injury until the skin has had sufficient time to heal.   Do not scratch, rub, or pick at the DERMABOND adhesive film. This may loosen the film before  your wound is healed.  Protect the wound from prolonged exposure to sunlight or tanning lamps while the film is in  place.   If you have any questions or concerns about this product, please consult your doctor. *Trademark ©[employer] 2002DISCHARGE SUMMARY from Nurse    The following personal items are in your possession at time of discharge:    Dental Appliances: None  Visual Aid: None  Home Medications: None  Jewelry: None  Clothing: None (left in in pt. room)  Other Valuables: None             PATIENT INSTRUCTIONS:    After general anesthesia or intravenous sedation, for 24 hours or while taking prescription Narcotics:  · Limit your activities  · Do not drive and operate hazardous machinery  · Do not make important personal or business decisions  · Do  not drink alcoholic beverages  · If you have not urinated within 8 hours after discharge, please contact your surgeon on call. Report the following to your surgeon:  · Excessive pain, swelling, redness or odor of or around the surgical area  · Temperature over 100.5  · Nausea and vomiting lasting longer than 4 hours or if unable to take medications  · Any signs of decreased circulation or nerve impairment to extremity: change in color, persistent  numbness, tingling, coldness or increase pain  · Any questions    To prevent infection remember to refer to your handout on handwashing given to you by your nurse. *  Please give a list of your current medications to your Primary Care Provider. *  Please update this list whenever your medications are discontinued, doses are      changed, or new medications (including over-the-counter products) are added. *  Please carry medication information at all times in case of emergency situations. These are general instructions for a healthy lifestyle:    No smoking/ No tobacco products/ Avoid exposure to second hand smoke    Surgeon General's Warning:  Quitting smoking now greatly reduces serious risk to your health.     Obesity, smoking, and sedentary lifestyle greatly increases your risk for illness    A healthy diet, regular physical exercise & weight monitoring are important for maintaining a healthy lifestyle    You may be retaining fluid if you have a history of heart failure or if you experience any of the following symptoms:  Weight gain of 3 pounds or more overnight or 5 pounds in a week, increased swelling in our hands or feet or shortness of breath while lying flat in bed. Please call your doctor as soon as you notice any of these symptoms; do not wait until your next office visit. Recognize signs and symptoms of STROKE:    F-face looks uneven    A-arms unable to move or move unevenly    S-speech slurred or non-existent    T-time-call 911 as soon as signs and symptoms begin-DO NOT go       Back to bed or wait to see if you get better-TIME IS BRAIN. The discharge information has been reviewed with the patient. The patient verbalized understanding.

## 2021-01-28 NOTE — PROGRESS NOTES
Patient discharged to home. Iv removed. Discharge instructions, scripts, and medication education done with patient.

## 2021-01-28 NOTE — PERIOP NOTES
FHT at bedside in 172, blood glucose 139. Labs sent @ 1315. Patient has not had covid test in past 14 days. Denies known contact or exposure to infected persons. Denies S/S. Adheres to social distancing, mask mandates. Valuables to pacu,1 bag.   in waiting room

## 2021-01-28 NOTE — PROGRESS NOTES
Fetal  in post-op. WNL. No patient distress. OK for d/c. Had discussed this plan with Dr. Rajan Pressley earlier this week.

## 2021-01-28 NOTE — ANESTHESIA POSTPROCEDURE EVALUATION
Procedure(s):  LAPAROSCOPIC CHOLECYSTECTOMY (URGENT). general    Anesthesia Post Evaluation        Patient location during evaluation: PACU  Note status: Adequate. Level of consciousness: responsive to verbal stimuli and sleepy but conscious  Pain management: satisfactory to patient  Airway patency: patent  Anesthetic complications: no  Cardiovascular status: acceptable  Respiratory status: acceptable  Hydration status: acceptable  Comments: +Post-Anesthesia Evaluation and Assessment    Patient: Jerica Patel Missed MRN: [id number]  SSN: [id number]   YOB: 1985  Age: 43 y.o. Sex: female      Cardiovascular Function/Vital Signs    /63   Pulse 93   Temp 36.7 °C (98.1 °F)   Resp 17   Ht 5' 4\" (1.626 m)   Wt 87.9 kg (193 lb 12.6 oz)   SpO2 96%   BMI 33.26 kg/m²     Patient is status post Procedure(s):  LAPAROSCOPIC CHOLECYSTECTOMY (URGENT). Nausea/Vomiting: Controlled. Postoperative hydration reviewed and adequate. Pain:  Pain Scale 1: Numeric (0 - 10) (01/28/21 1600)  Pain Intensity 1: 0 (01/28/21 1600)   Managed. Neurological Status:   Neuro (WDL): Exceptions to WDL (01/28/21 1510)   At baseline. Mental Status and Level of Consciousness: Arousable. Pulmonary Status:   O2 Device: Room air (01/28/21 1525)   Adequate oxygenation and airway patent. Complications related to anesthesia: None    Post-anesthesia assessment completed. No concerns. Signed By: Blayne Dash MD    1/28/2021  Post anesthesia nausea and vomiting:  controlled      INITIAL Post-op Vital signs:   Vitals Value Taken Time   /67 01/28/21 1615   Temp 36.7 °C (98.1 °F) 01/28/21 1510   Pulse 93 01/28/21 1617   Resp 15 01/28/21 1617   SpO2 96 % 01/28/21 1617   Vitals shown include unvalidated device data.

## 2021-01-28 NOTE — INTERVAL H&P NOTE
Update History & Physical 
 
The Patient's History and Physical below was reviewed with the patient and I examined the patient today. There was no change. The surgical site was confirmed by the patient and me. Plan:  The risk, benefits, expected outcome, and alternative to the recommended procedure have been discussed with the patient. Patient understands and wants to proceed with the procedure. Risk to the fetus were again discussed and she wishes to proceed with surgery. Fetal heart tines being checked in pre and post-op. The patient was counseled at length about the risks of vini Covid-19 during their perioperative period and any recovery window from their procedure. The patient was made aware that vini Covid-19  may worsen their prognosis for recovering from their procedure and lend to a higher morbidity and/or mortality risk. All material risks, benefits, and reasonable alternatives including postponing the procedure were discussed. The patient wishes to proceed with the procedure at this time.

## 2021-01-28 NOTE — PERIOP NOTES
Handoff Report from Operating Room to PACU    Report received from [address] [place] [address]  and [address] regarding [facility] Missed. Surgeon(s):  Gaurav Guadalupe MD  And Procedure(s) (LRB):  LAPAROSCOPIC CHOLECYSTECTOMY (URGENT) (N/A)  confirmed   with allergies and dressings discussed. Anesthesia type, drugs, patient history, complications, estimated blood loss, vital signs, intake and output, and last pain medication, lines, reversal medications and temperature were reviewed. Pt stable.  Pt pregnant-- FHT ausculted via doppler at approx 140bpm    4:25 PM  Report off to Tameka pt transferred to Phase II for d/c

## 2021-01-28 NOTE — OP NOTES
DATE OF PROCEDURE:  1/28/2021     PREOPERATIVE DIAGNOSIS:   Symptomatic gallstones, ~18 weeks pregnant    POSTOPERATIVE DIAGNOSIS:   same    OPERATIVE PROCEDURE:  Laparoscopic cholecystectomy (CPT 28358', modifier 22')    SURGEON:  Celestino Segovia MD    ANESTHESIA:  General endotracheal.    EBL: minimal    SPECIMENS:   ID Type Source Tests Collected by Time Destination   1 : Gallbladder Preservative Gallbladder  Jonah Powers MD 1/28/2021 1352 Pathology        FINDINGS:  Mild inflammation of the gallbladder, gallstones. INDICATIONS:  Continued episodes of RUQ and epigastric pain. Gallstones on ultrasound. Risk to patient and fetus discussed at length with patient. Discussed with her OB as well. DESCRIPTION OF PROCEDURE:  After obtaining informed consent, the patient was taken to the operating room and placed supine on the operating table. An operative time-out was performed, and general endotracheal anesthesia was induced. Preoperative antibiotics were administered, and the abdomen was prepped and draped in the usual sterile fashion. While palpating the upper limit of the uterus the abdomen was then entered approximately 8 cm and above the umbilicus using a 5-mm optical trocar. The abdomen was the insufflated without incident and was visually explored. There was no other visible pathology noted. The gravid uterus was visualized and appeared grossly normal.  Two right upper quadrant 5-mm ports were placed under direct vision, followed by a 12-mm port in the subxiphoid position. Local anesthetic was infiltrated at the port sites prior to placement. The gallbladder fundus was then grasped and retracted cephalad over the liver. The triangle of Calot was exposed, and the cystic duct and artery were skeletonized using a combination of blunt dissection with a [place] dissector and hook electrocautery. The cystic artery was then divided between clips with 2 clips left on the patient side. The cystic duct was then traced from the gallbladder infundibulum into its insertion into the portal triad. The cystic duct was swept with a grasper up into the gallbladder. The cystic duct was then divided between clips directly adjacent to the gallbladder infundibulum, with 3 clips left on the patient's side. The gallbladder was then removed from the liver bed using hook electrocautery. It was removed from the abdominal cavity using a bag through the subxiphoid incision. The liver bed was inspected for hemostasis, and excellent hemostasis was achieved with minimal electrocautery. The clips on the cystic duct and artery were again visualized and were intact. The area below and lateral to the liver was suction irrigated until clear. The right upper quadrant ports were removed under direct vision and both were hemostatic. The abdomen was then desufflated through the subxiphoid port under direct vision via the umbilical port. These ports were subsequently removed and the fascial defect at the 12-mm incision was closed with an interrupted 0 Vicryl suture. The incisions were irrigated with sterile saline and made hemostatic with minimal electrocautery. They were closed with buried interrupted 4-0 Monocryl sutures, and dressed with sterile dressing. The patient was recovered from general anesthesia and taken to the recovery area in satisfactory condition. All instrument, sponge, and needle counts were reported as correct.     Bouchra Guallpa MD

## 2021-01-28 NOTE — ANESTHESIA PREPROCEDURE EVALUATION
Relevant Problems   No relevant active problems       Anesthetic History   No history of anesthetic complications            Review of Systems / Medical History  Patient summary reviewed, nursing notes reviewed and pertinent labs reviewed    Pulmonary  Within defined limits                 Neuro/Psych   Within defined limits           Cardiovascular  Within defined limits                Exercise tolerance: >4 METS     GI/Hepatic/Renal  Within defined limits              Endo/Other    Diabetes    Obesity     Other Findings   Comments: Gallstones   17 weeks pregnant         Physical Exam    Airway  Mallampati: II  TM Distance: > 6 cm  Neck ROM: normal range of motion   Mouth opening: Normal     Cardiovascular  Regular rate and rhythm,  S1 and S2 normal,  no murmur, click, rub, or gallop             Dental  No notable dental hx       Pulmonary  Breath sounds clear to auscultation               Abdominal  GI exam deferred       Other Findings            Anesthetic Plan    ASA: 2  Anesthesia type: general    Monitoring Plan: BIS      Induction: Intravenous  Anesthetic plan and risks discussed with: Patient

## 2021-01-29 RX ORDER — CALCIUM CITRATE/VITAMIN D3 200MG-6.25
TABLET ORAL
Qty: 250 STRIP | Refills: 6 | Status: SHIPPED | OUTPATIENT
Start: 2021-01-29 | End: 2022-10-31

## 2021-02-03 ENCOUNTER — HOSPITAL ENCOUNTER (OUTPATIENT)
Dept: PERINATAL CARE | Age: 36
Discharge: HOME | End: 2021-02-03
Attending: OBSTETRICS & GYNECOLOGY
Payer: MEDICAID

## 2021-02-03 PROCEDURE — 76811 OB US DETAILED SNGL FETUS: CPT | Performed by: OBSTETRICS & GYNECOLOGY

## 2021-02-10 ENCOUNTER — ROUTINE PRENATAL (OUTPATIENT)
Dept: OBGYN CLINIC | Age: 36
End: 2021-02-10
Payer: MEDICAID

## 2021-02-10 ENCOUNTER — OFFICE VISIT (OUTPATIENT)
Dept: ENDOCRINOLOGY | Age: 36
End: 2021-02-10
Payer: MEDICAID

## 2021-02-10 VITALS
BODY MASS INDEX: 33.16 KG/M2 | DIASTOLIC BLOOD PRESSURE: 71 MMHG | WEIGHT: 194.2 LBS | HEIGHT: 64 IN | TEMPERATURE: 97 F | OXYGEN SATURATION: 98 % | SYSTOLIC BLOOD PRESSURE: 121 MMHG | HEART RATE: 79 BPM | RESPIRATION RATE: 18 BRPM

## 2021-02-10 VITALS — BODY MASS INDEX: 33.47 KG/M2 | SYSTOLIC BLOOD PRESSURE: 123 MMHG | WEIGHT: 195 LBS | DIASTOLIC BLOOD PRESSURE: 69 MMHG

## 2021-02-10 DIAGNOSIS — O24.414 INSULIN CONTROLLED GESTATIONAL DIABETES MELLITUS (GDM) IN SECOND TRIMESTER: Primary | ICD-10-CM

## 2021-02-10 DIAGNOSIS — Z34.80 SUPERVISION OF OTHER NORMAL PREGNANCY: Primary | ICD-10-CM

## 2021-02-10 LAB — HBA1C MFR BLD HPLC: 5.5 %

## 2021-02-10 PROCEDURE — 0502F SUBSEQUENT PRENATAL CARE: CPT | Performed by: OBSTETRICS & GYNECOLOGY

## 2021-02-10 PROCEDURE — 99214 OFFICE O/P EST MOD 30 MIN: CPT | Performed by: INTERNAL MEDICINE

## 2021-02-10 PROCEDURE — 83036 HEMOGLOBIN GLYCOSYLATED A1C: CPT | Performed by: INTERNAL MEDICINE

## 2021-02-10 RX ORDER — PEN NEEDLE, DIABETIC 31 GX3/16"
NEEDLE, DISPOSABLE MISCELLANEOUS
Qty: 100 PEN NEEDLE | Refills: 6 | Status: SHIPPED | OUTPATIENT
Start: 2021-02-10 | End: 2021-07-23

## 2021-02-10 RX ORDER — INSULIN ASPART 100 [IU]/ML
INJECTION, SOLUTION INTRAVENOUS; SUBCUTANEOUS
Qty: 15 ML | Refills: 6 | Status: SHIPPED | OUTPATIENT
Start: 2021-02-10 | End: 2021-03-04 | Stop reason: SDUPTHER

## 2021-02-10 NOTE — PATIENT INSTRUCTIONS
SPECIFIC INSTRUCTIONS BELOW SPECIFIC INSTRUCTIONS BELOW [address] Do not skip meals Do not eat in between meals Reduce carbs- pasta, rice, potatoes, bread Try to avoid processed bread products like BISCUITS, CROISSANTS, MUFFINS Do not drink juices or sodas, even if they are calorie zero or diet drinks and especially avoid using powders like crystalloids Do not eat peanut butter Do not eat sugar free cookies and cakes Do not eat peaches, oranges, pineapples, raisins, grapes , canteloupe , honey dew, mangoes , watermelon  and fruit medleys 
 
------------------------------------------------------------------------------------------------- 
  
TALK TO OB ABOUT METFORMIN Check blood sugars immediately before each meal and at bedtime AND  2 HR POST MEALS , Increase    LEVEMIR   insulin  24  units  at bed time  (  NPH  INSULIN ) Increase  NOVOLOG  insulin  8  units before breakfast, 8  units before lunch and 0 units before dinner. Also, add additional NOVOLOG  as follows BEFORE  meals,   If blood sugars are[de-identified] 
 
120 TO  150  MG  1 UNIT  
 
150-200 mg 3 units 201-250 mg 5 units 251-300 mg 7  units 301-350 mg 9 units Less than 70 mg NO INSULIN 
 
 
PAY ATTENTION TO THESE GENERAL INSTRUCTIONS  
 
- HEALTH MAINTENANCE IS NOT GOING TO BE UP TO DATE ON YOUR AVS- PLEASE IGNORE  
- YOUR MED LIST IS NOT UP TO DATE AS SOME CHANGES ARE BEING MADE AFTER THE VISIT - FOLLOW SPECIFIC INSTRUCTIONS ABOVE Results *Normal results will not be notified by a phone call starting January 1 2021 *If you have an upcoming visit, the results will be discussed at the visit *Please sign up for MY CHART if you want access to your lab and test results *Abnormal results which require immediate attention will be notified by phone call *Abnormal results which do not require immediate assistance will be notified in 1-2 weeks Refills    -    have your pharmacy send us a refill request 
Phone calls  -  Allow  24 hrs. for non-urgent calls to be returned Prior authorization - It may take 2-4 weeks to process Forms  -  FMLA, DMV etc., will take up to 2 weeks to process Cancellations - please notify the office 2 days in advance Samples  - will only be dispensed at visits  
 
--------------------------------------------------------------------------------------------

## 2021-02-10 NOTE — PROGRESS NOTES
1. Have you been to the ER, urgent care clinic since your last visit? January 2021/Gallbladder Removal/[facility]  Hospitalized since your last visit? No    2. Have you seen or consulted any other health care providers outside of the [address] [place] [address] since your last visit? Include any pap smears or colon screening. No    Wt Readings from Last 3 Encounters:   02/10/21 194 lb 3.2 oz (88.1 kg)   01/28/21 193 lb 12.6 oz (87.9 kg)   01/18/21 192 lb 8 oz (87.3 kg)     Temp Readings from Last 3 Encounters:   02/10/21 97 °F (36.1 °C) (Oral)   01/28/21 98 °F (36.7 °C)   01/18/21 97.5 °F (36.4 °C)     BP Readings from Last 3 Encounters:   02/10/21 121/71   01/28/21 122/67   01/18/21 125/72     Pulse Readings from Last 3 Encounters:   02/10/21 79   01/28/21 97   01/18/21 83     Lab Results   Component Value Date/Time    Hemoglobin A1c (POC) 5.6 01/13/2021 01:22 PM     Patient has logbook today.

## 2021-02-10 NOTE — PROGRESS NOTES
Pt doing well, see prenatal flowsheet.   Endocrine appt today  [facility] appt last week

## 2021-02-10 NOTE — PROGRESS NOTES
[facility]  Alia Goldberg MD, [address] [place] is a 43 y.o. female presents today     HPI    Patient here for follow up after initial visit of Type 2 diabetes mellitus, Gestational    From 2021     In the interim, she had lap choly on      Referred : by Dr. Stephan Menezes -      She is thru 16 weeks of gestational age   - had to take insulin  ,  18 mths  p4- diet controlled , 10 yrs  p3- no glucose intolerance  11  p2- 14 and p1 17     FTND   She never  Had any check done in between pregnancies   and  one   Patient was called by her OB yesterday and she was asked to start checking sugars more often   Current monitoring regimen: home blood tests - daily and  - but she was using the parents' meter for checks  Home blood sugar records: trend: UNKNOWN   FROM PATIENT'S MEMORY  - FBG  - 140TO 160   AFTER MEALS 180 -210   Weight trend: stable  Prior visit with dietician: yes - diabetes educator       Past Medical History:   Diagnosis Date    Diabetes ([facility] [address]      Past Surgical History:   Procedure Laterality Date    HX APPENDECTOMY       Current Outpatient Medications   Medication Sig    glucose blood VI test strips (True Metrix Glucose Test Strip) strip Use to check blood sugars eight times daily. Dx. Code E11.65    insulin detemir U-100 (LEVEMIR FLEXTOUCH) 100 unit/mL (3 mL) inpn Inject 24 units at bedtime.  insulin aspart U-100 (NOVOLOG) 100 unit/mL (3 mL) inpn Inject 5 units before breakfast and lunch and 3 units before dinner with sliding scale to max 28 units daily.  Insulin Needles, Disposable, 32 gauge x 5/32\" ndle Use to inject insulin four times daily. Dx. Code E11.65    Blood-Glucose Meter (True Metrix Glucose Meter) misc Use to check blood sugars eight times daily. Dx. Code E11.65    lancets misc Use to check blood sugars eight times daily.  Dx. Code E11.65    Blood-Glucose Meter monitoring kit Please dispense insurance preferred meter Use PRN to check blood sugars    lancets misc Use 4 times daily to check blood sugar please dispense insurance preferred    glucose blood VI test strips (ASCENSIA AUTODISC VI, ONE TOUCH ULTRA TEST VI) strip Use 4 times daily to check blood sugar please dispense insurance preferred    aspirin delayed-release 81 mg tablet Take 81 mg by mouth daily.  -iron-FA-om-3s-dha-epa (Vitafol Gummies) 3.33 mg iron- 0.33 mg chew Take 1 Tab by mouth daily.  doxylamine-pyridoxine, vit B6, (Diclegis) 10-10 mg TbEC DR tablet Take 2 tablets by mouth nightly. May add 1 tab in the morning and 1 tab in the afternoon. 4 tabs max daily. #120 tabs for 30 days    polyethylene glycol (MIRALAX) 17 gram/dose powder Take 17 g by mouth daily.  ondansetron (Zofran ODT) 8 mg disintegrating tablet Take 1 Tab by mouth every eight (8) hours as needed for Nausea. No current facility-administered medications for this visit.          ROS : negative       Vitals:    02/10/21 0831   BP: 121/71   BP 1 Location: Left upper arm   BP Patient Position: Sitting   BP Cuff Size: Large adult   Pulse: 79   Resp: 18   Temp: 97 °F (36.1 °C)   TempSrc: Oral   SpO2: 98%   Weight: 194 lb 3.2 oz (88.1 kg)   Height: 5' 4\" (1.626 m)        Physical Exam :    Gestational abdomen             Assessment and Plan :      1. Diabetes , GESTATIONAL   :  A1C on jan 13 2021 -  BY poc IS 5.6 %  +- 0.4 %  = 6  %    SHE WOULD HAVE BEEN PREDIABETIC BEFORE PREGNANCY        She is 19  weeks into GA  Reviewed the glucose log hand written  : fasting  - sugars are below 100 mg    2 hr post meal - sugars are below 130 mg     Today,she says  She increased humalog to 14 units herself and she also is a bit confused on how much to take if sugars are low   I adjusted insulins and admonished her not to increase humalog as dramatically up on her own -because of the risk of hypoglycemia     She is educated about the importance of glycemic control for healthy baby and safe delivery.  She is told to check blood sugars before meals and one hour after meals and at bed time on some days (2 hours post dinner).    She attended  the DTC education class already .  She is told to upload her log/meter for review.    Patient is advised to check blood sugars 6  times daily by rotation method.  reviewed medications and side effects in detail  lab results and schedule of future lab studies reviewed with patient        2. Hypoglycemia :  Educated on treating the hypoglycemia.         Reviewed results with patient and discussed the labs being ordered today/bnv  Patient voiced understanding of plan of care

## 2021-02-12 LAB
AFP ADJ MOM SERPL: 0.85
AFP INTERP SERPL-IMP: NORMAL
AFP INTERP SERPL-IMP: NORMAL
AFP SERPL-MCNC: 33 NG/ML
AGE AT DELIVERY: 38.4 YR
COMMENT, 018013: NORMAL
GA METHOD: NORMAL
GA: 20 WEEKS
IDDM PATIENT QL: YES
MULTIPLE PREGNANCY: NO
NEURAL TUBE DEFECT RISK FETUS: 6423 %
RESULTS, 017004: NORMAL

## 2021-03-03 ENCOUNTER — HOSPITAL ENCOUNTER (OUTPATIENT)
Dept: PERINATAL CARE | Age: 36
Discharge: HOME | End: 2021-03-03
Attending: OBSTETRICS & GYNECOLOGY
Payer: MEDICAID

## 2021-03-03 PROCEDURE — 76816 OB US FOLLOW-UP PER FETUS: CPT | Performed by: OBSTETRICS & GYNECOLOGY

## 2021-03-04 ENCOUNTER — OFFICE VISIT (OUTPATIENT)
Dept: ENDOCRINOLOGY | Age: 36
End: 2021-03-04
Payer: MEDICAID

## 2021-03-04 VITALS
SYSTOLIC BLOOD PRESSURE: 137 MMHG | RESPIRATION RATE: 18 BRPM | HEART RATE: 87 BPM | TEMPERATURE: 97 F | OXYGEN SATURATION: 99 % | WEIGHT: 195.4 LBS | BODY MASS INDEX: 33.36 KG/M2 | HEIGHT: 64 IN | DIASTOLIC BLOOD PRESSURE: 73 MMHG

## 2021-03-04 DIAGNOSIS — Z79.4 ENCOUNTER FOR LONG-TERM (CURRENT) USE OF INSULIN (HCC): ICD-10-CM

## 2021-03-04 DIAGNOSIS — O24.414 INSULIN CONTROLLED GESTATIONAL DIABETES MELLITUS (GDM) IN SECOND TRIMESTER: Primary | ICD-10-CM

## 2021-03-04 LAB — HBA1C MFR BLD HPLC: 5.7 %

## 2021-03-04 PROCEDURE — 83036 HEMOGLOBIN GLYCOSYLATED A1C: CPT | Performed by: INTERNAL MEDICINE

## 2021-03-04 PROCEDURE — 99214 OFFICE O/P EST MOD 30 MIN: CPT | Performed by: INTERNAL MEDICINE

## 2021-03-04 RX ORDER — INSULIN ASPART 100 [IU]/ML
INJECTION, SOLUTION INTRAVENOUS; SUBCUTANEOUS
Qty: 15 ML | Refills: 6 | Status: SHIPPED | OUTPATIENT
Start: 2021-03-04 | End: 2021-07-23

## 2021-03-04 NOTE — LETTER
3/4/2021 Patient: Marquez Grady YOB: 1985 Date of Visit: 3/4/2021 Juan Alvarez MD 
[address] [address] [address] [employer] Dear Juan Alvarez MD, Thank you for referring Ms. Rosaline Grady to [id number] [place] [address] for evaluation. My notes for this consultation are attached. If you have questions, please do not hesitate to call me. I look forward to following your patient along with you. Sincerely, Gaudencio Crook MD

## 2021-03-04 NOTE — PROGRESS NOTES
1. Have you been to the ER, urgent care clinic since your last visit? No  Hospitalized since your last visit? No    2. Have you seen or consulted any other health care providers outside of the [address] [place] [address] since your last visit? Include any pap smears or colon screening. No    Wt Readings from Last 3 Encounters:   03/04/21 195 lb 6.4 oz (88.6 kg)   02/10/21 195 lb (88.5 kg)   02/10/21 194 lb 3.2 oz (88.1 kg)     Temp Readings from Last 3 Encounters:   03/04/21 97 °F (36.1 °C) (Oral)   02/10/21 97 °F (36.1 °C) (Oral)   01/28/21 98 °F (36.7 °C)     BP Readings from Last 3 Encounters:   03/04/21 137/73   02/10/21 123/69   02/10/21 121/71     Pulse Readings from Last 3 Encounters:   03/04/21 87   02/10/21 79   01/28/21 97     Lab Results   Component Value Date/Time    Hemoglobin A1c (POC) 5.5 02/10/2021 08:35 AM     Patient has logbook today.

## 2021-03-04 NOTE — PATIENT INSTRUCTIONS
SPECIFIC INSTRUCTIONS BELOW [address] water Do not skip meals Do not eat in between meals Reduce carbs- pasta, rice, potatoes, bread Try to avoid processed bread products like BISCUITS, CROISSANTS, MUFFINS Do not drink juices or sodas, even if they are calorie zero or diet drinks and especially avoid using powders like crystalloids Do not eat peanut butter Do not eat sugar free cookies and cakes Do not eat peaches, oranges, pineapples, raisins, grapes , canteloupe , honey dew, mangoes , watermelon  and fruit medleys 
 
------------------------------------------------------------------------------------------------- Check blood sugars immediately before each b-fast   And  Lunch   and at bedtime. She does not eat dinner AND  2 HR POST b-fast and lunch Brunch  at 1 pm   
 
Dinner  At 6-7 pm   
 
 
Increase    LEVEMIR   insulin  To   30   units  at   12  MN    And   15   units    At 12 noon Increase  NOVOLOG  insulin  8  units before brunch,    8  units before   supper Also, add additional NOVOLOG  as follows BEFORE  meals,   If blood sugars are[de-identified] 
 
120 TO  150  MG  2   UNIT  
 
150-200 mg 4  units 201-250 mg 5 units 251-300 mg 7  units 301-350 mg 9 units Less than 70 mg NO INSULIN 
 
 
PAY ATTENTION TO THESE GENERAL INSTRUCTIONS  
 
- HEALTH MAINTENANCE IS NOT GOING TO BE UP TO DATE ON YOUR AVS- PLEASE IGNORE  
- YOUR MED LIST IS NOT UP TO DATE AS SOME CHANGES ARE BEING MADE AFTER THE VISIT - FOLLOW SPECIFIC INSTRUCTIONS ABOVE Results *Normal results will not be notified by a phone call starting January 1 2021 *If you have an upcoming visit, the results will be discussed at the visit *Please sign up for MY CHART if you want access to your lab and test results *Abnormal results which require immediate attention will be notified by phone call *Abnormal results which do not require immediate assistance will be notified in 1-2 weeks Refills    -    have your pharmacy send us a refill request 
Phone calls  -  Allow  24 hrs. for non-urgent calls to be returned Prior authorization - It may take 2-4 weeks to process Forms  -  FMLA, DMV etc., will take up to 2 weeks to process Cancellations - please notify the office 2 days in advance Samples  - will only be dispensed at visits  
 
-------------------------------------------------------------------------------------------- 
 
 
 
 
 
 
PAY ATTENTION TO THESE GENERAL INSTRUCTIONS  
 
- HEALTH MAINTENANCE IS NOT GOING TO BE UP TO DATE ON YOUR AVS- PLEASE IGNORE  
- YOUR MED LIST IS NOT UP TO DATE AS SOME CHANGES ARE BEING MADE AFTER THE VISIT - FOLLOW SPECIFIC INSTRUCTIONS ABOVE Results *Normal results will not be notified by a phone call starting January 1 2021 *If you have an upcoming visit, the results will be discussed at the visit *Please sign up for MY CHART if you want access to your lab and test results *Abnormal results which require immediate attention will be notified by phone call *Abnormal results which do not require immediate assistance will be notified in 1-2 weeks Refills    -    have your pharmacy send us a refill request 
Phone calls  -  Allow  24 hrs. for non-urgent calls to be returned Prior authorization - It may take 2-4 weeks to process Forms  -  FMLA, DMV etc., will take up to 2 weeks to process Cancellations - please notify the office 2 days in advance Samples  - will only be dispensed at visits  
 
--------------------------------------------------------------------------------------------
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
## 2021-03-11 ENCOUNTER — ROUTINE PRENATAL (OUTPATIENT)
Dept: OBGYN CLINIC | Age: 36
End: 2021-03-11
Payer: MEDICAID

## 2021-03-11 VITALS — BODY MASS INDEX: 33.64 KG/M2 | WEIGHT: 196 LBS | DIASTOLIC BLOOD PRESSURE: 70 MMHG | SYSTOLIC BLOOD PRESSURE: 118 MMHG

## 2021-03-11 DIAGNOSIS — Z34.80 SUPERVISION OF OTHER NORMAL PREGNANCY: Primary | ICD-10-CM

## 2021-03-11 PROCEDURE — 0502F SUBSEQUENT PRENATAL CARE: CPT | Performed by: OBSTETRICS & GYNECOLOGY

## 2021-04-07 ENCOUNTER — HOSPITAL ENCOUNTER (OUTPATIENT)
Dept: PERINATAL CARE | Age: 36
Discharge: HOME | End: 2021-04-07
Attending: OBSTETRICS & GYNECOLOGY
Payer: MEDICAID

## 2021-04-07 PROCEDURE — 76816 OB US FOLLOW-UP PER FETUS: CPT | Performed by: OBSTETRICS & GYNECOLOGY

## 2021-04-12 ENCOUNTER — TELEPHONE (OUTPATIENT)
Dept: OBGYN CLINIC | Age: 36
End: 2021-04-12

## 2021-04-12 ENCOUNTER — ROUTINE PRENATAL (OUTPATIENT)
Dept: OBGYN CLINIC | Age: 36
End: 2021-04-12
Payer: MEDICAID

## 2021-04-12 VITALS — SYSTOLIC BLOOD PRESSURE: 123 MMHG | WEIGHT: 198.2 LBS | DIASTOLIC BLOOD PRESSURE: 74 MMHG | BODY MASS INDEX: 34.02 KG/M2

## 2021-04-12 DIAGNOSIS — Z34.80 SUPERVISION OF OTHER NORMAL PREGNANCY: Primary | ICD-10-CM

## 2021-04-12 DIAGNOSIS — Z23 ENCOUNTER FOR IMMUNIZATION: ICD-10-CM

## 2021-04-12 PROCEDURE — 90471 IMMUNIZATION ADMIN: CPT | Performed by: OBSTETRICS & GYNECOLOGY

## 2021-04-12 PROCEDURE — 0502F SUBSEQUENT PRENATAL CARE: CPT | Performed by: OBSTETRICS & GYNECOLOGY

## 2021-04-12 PROCEDURE — 90715 TDAP VACCINE 7 YRS/> IM: CPT | Performed by: OBSTETRICS & GYNECOLOGY

## 2021-04-12 NOTE — PROGRESS NOTES
Pt doing well, see prenatal flowsheet. CBC, tdap, consents reviewed today  Reports Blood sugars are looking good.

## 2021-04-12 NOTE — TELEPHONE ENCOUNTER
Patient called needing a dental clearance letter faxed over to [address] (f: [phone])    Advised her that I will get this faxed over for her. She verbalized understanding.

## 2021-04-13 LAB
ERYTHROCYTE [DISTWIDTH] IN BLOOD BY AUTOMATED COUNT: 13.2 % (ref 11.7–15.4)
HCT VFR BLD AUTO: 34.6 % (ref 34–46.6)
HGB BLD-MCNC: 11.5 G/DL (ref 11.1–15.9)
MCH RBC QN AUTO: 27.5 PG (ref 26.6–33)
MCHC RBC AUTO-ENTMCNC: 33.2 G/DL (ref 31.5–35.7)
MCV RBC AUTO: 83 FL (ref 79–97)
PLATELET # BLD AUTO: 233 X10E3/UL (ref 150–450)
RBC # BLD AUTO: 4.18 X10E6/UL (ref 3.77–5.28)
WBC # BLD AUTO: 6.2 X10E3/UL (ref 3.4–10.8)

## 2021-04-26 ENCOUNTER — ROUTINE PRENATAL (OUTPATIENT)
Dept: OBGYN CLINIC | Age: 36
End: 2021-04-26
Payer: MEDICAID

## 2021-04-26 VITALS — SYSTOLIC BLOOD PRESSURE: 128 MMHG | DIASTOLIC BLOOD PRESSURE: 78 MMHG | BODY MASS INDEX: 33.3 KG/M2 | WEIGHT: 194 LBS

## 2021-04-26 DIAGNOSIS — Z34.80 SUPERVISION OF OTHER NORMAL PREGNANCY: Primary | ICD-10-CM

## 2021-04-26 PROCEDURE — 0502F SUBSEQUENT PRENATAL CARE: CPT | Performed by: OBSTETRICS & GYNECOLOGY

## 2021-05-05 ENCOUNTER — HOSPITAL ENCOUNTER (OUTPATIENT)
Dept: PERINATAL CARE | Age: 36
Discharge: HOME | End: 2021-05-05
Attending: OBSTETRICS & GYNECOLOGY
Payer: MEDICAID

## 2021-05-05 PROCEDURE — 76816 OB US FOLLOW-UP PER FETUS: CPT | Performed by: OBSTETRICS & GYNECOLOGY

## 2021-05-05 PROCEDURE — 76819 FETAL BIOPHYS PROFIL W/O NST: CPT | Performed by: OBSTETRICS & GYNECOLOGY

## 2021-05-10 ENCOUNTER — ROUTINE PRENATAL (OUTPATIENT)
Dept: OBGYN CLINIC | Age: 36
End: 2021-05-10
Payer: MEDICAID

## 2021-05-10 VITALS — WEIGHT: 200.2 LBS | SYSTOLIC BLOOD PRESSURE: 128 MMHG | DIASTOLIC BLOOD PRESSURE: 83 MMHG | BODY MASS INDEX: 34.36 KG/M2

## 2021-05-10 DIAGNOSIS — Z34.80 SUPERVISION OF OTHER NORMAL PREGNANCY: Primary | ICD-10-CM

## 2021-05-10 PROCEDURE — 0502F SUBSEQUENT PRENATAL CARE: CPT | Performed by: OBSTETRICS & GYNECOLOGY

## 2021-05-10 NOTE — PROGRESS NOTES
Pt doing well, see prenatal flowsheet. Seeing MFM weekly for BPPs. Reports BS well controlled on current insulin regime.

## 2021-05-11 NOTE — ED NOTES
Patient discharged by MD. Discharge papers signed, dated and timed/e-signature filed. Papers given and questions answered. Home with family.
Patient alert and awake, oriented x4, breathing with ease on room air, skin intact, ambulates steady at baseline. Patient reports being 7 weeks pregnant and has left pelvic pain 5/10, declines pain medications. Patient reports bright red vaginal bleeding yesterday, reports minimal brown bleeding today. Patient denies n/v/d, chest pain or trouble breathing.

## 2021-05-13 ENCOUNTER — HOSPITAL ENCOUNTER (OUTPATIENT)
Dept: PERINATAL CARE | Age: 36
Discharge: HOME | End: 2021-05-13
Attending: OBSTETRICS & GYNECOLOGY
Payer: MEDICAID

## 2021-05-13 PROCEDURE — 76819 FETAL BIOPHYS PROFIL W/O NST: CPT | Performed by: OBSTETRICS & GYNECOLOGY

## 2021-05-19 ENCOUNTER — HOSPITAL ENCOUNTER (OUTPATIENT)
Dept: PERINATAL CARE | Age: 36
Discharge: HOME | End: 2021-05-19
Attending: OBSTETRICS & GYNECOLOGY
Payer: MEDICAID

## 2021-05-19 PROCEDURE — 76819 FETAL BIOPHYS PROFIL W/O NST: CPT | Performed by: OBSTETRICS & GYNECOLOGY

## 2021-05-24 ENCOUNTER — HOSPITAL ENCOUNTER (INPATIENT)
Age: 36
LOS: 9 days | Discharge: HOME | DRG: 540 | End: 2021-06-02
Attending: OBSTETRICS & GYNECOLOGY | Admitting: OBSTETRICS & GYNECOLOGY
Payer: MEDICAID

## 2021-05-24 ENCOUNTER — ROUTINE PRENATAL (OUTPATIENT)
Dept: OBGYN CLINIC | Age: 36
End: 2021-05-24
Payer: MEDICAID

## 2021-05-24 VITALS — SYSTOLIC BLOOD PRESSURE: 159 MMHG | WEIGHT: 207 LBS | BODY MASS INDEX: 35.53 KG/M2 | DIASTOLIC BLOOD PRESSURE: 93 MMHG

## 2021-05-24 DIAGNOSIS — Z3A.36 36 WEEKS GESTATION OF PREGNANCY: ICD-10-CM

## 2021-05-24 DIAGNOSIS — Z34.80 SUPERVISION OF OTHER NORMAL PREGNANCY: Primary | ICD-10-CM

## 2021-05-24 DIAGNOSIS — O14.93 PRE-ECLAMPSIA IN THIRD TRIMESTER: ICD-10-CM

## 2021-05-24 DIAGNOSIS — R52 POSTPARTUM PAIN: Primary | ICD-10-CM

## 2021-05-24 DIAGNOSIS — O14.00 ANTEPARTUM MILD PREECLAMPSIA: ICD-10-CM

## 2021-05-24 DIAGNOSIS — O24.419 GESTATIONAL DIABETES MELLITUS (GDM) AFFECTING PREGNANCY: ICD-10-CM

## 2021-05-24 LAB
ALBUMIN SERPL-MCNC: 2.6 G/DL (ref 3.5–5)
ALBUMIN/GLOB SERPL: 0.7 {RATIO} (ref 1.1–2.2)
ALP SERPL-CCNC: 121 U/L (ref 45–117)
ALT SERPL-CCNC: 9 U/L (ref 12–78)
ANION GAP SERPL CALC-SCNC: 9 MMOL/L (ref 5–15)
AST SERPL-CCNC: 10 U/L (ref 15–37)
BASOPHILS # BLD: 0 K/UL (ref 0–0.1)
BASOPHILS NFR BLD: 0 % (ref 0–1)
BILIRUB SERPL-MCNC: 0.6 MG/DL (ref 0.2–1)
BUN SERPL-MCNC: 6 MG/DL (ref 6–20)
BUN/CREAT SERPL: 21 (ref 12–20)
CALCIUM SERPL-MCNC: 8 MG/DL (ref 8.5–10.1)
CHLORIDE SERPL-SCNC: 110 MMOL/L (ref 97–108)
CO2 SERPL-SCNC: 20 MMOL/L (ref 21–32)
COVID-19 RAPID TEST, COVR: NOT DETECTED
CREAT SERPL-MCNC: 0.28 MG/DL (ref 0.55–1.02)
CREAT UR-MCNC: 289 MG/DL
DIFFERENTIAL METHOD BLD: ABNORMAL
EOSINOPHIL # BLD: 0.1 K/UL (ref 0–0.4)
EOSINOPHIL NFR BLD: 1 % (ref 0–7)
ERYTHROCYTE [DISTWIDTH] IN BLOOD BY AUTOMATED COUNT: 13.9 % (ref 11.5–14.5)
GLOBULIN SER CALC-MCNC: 3.6 G/DL (ref 2–4)
GLUCOSE BLD STRIP.AUTO-MCNC: 199 MG/DL (ref 65–117)
GLUCOSE BLD STRIP.AUTO-MCNC: 71 MG/DL (ref 65–117)
GLUCOSE SERPL-MCNC: 133 MG/DL (ref 65–100)
HCT VFR BLD AUTO: 33 % (ref 35–47)
HGB BLD-MCNC: 10.9 G/DL (ref 11.5–16)
IMM GRANULOCYTES # BLD AUTO: 0 K/UL (ref 0–0.04)
IMM GRANULOCYTES NFR BLD AUTO: 1 % (ref 0–0.5)
LYMPHOCYTES # BLD: 1.5 K/UL (ref 0.8–3.5)
LYMPHOCYTES NFR BLD: 27 % (ref 12–49)
MCH RBC QN AUTO: 26.7 PG (ref 26–34)
MCHC RBC AUTO-ENTMCNC: 33 G/DL (ref 30–36.5)
MCV RBC AUTO: 80.7 FL (ref 80–99)
MONOCYTES # BLD: 0.4 K/UL (ref 0–1)
MONOCYTES NFR BLD: 6 % (ref 5–13)
NEUTS SEG # BLD: 3.6 K/UL (ref 1.8–8)
NEUTS SEG NFR BLD: 65 % (ref 32–75)
NRBC # BLD: 0 K/UL (ref 0–0.01)
NRBC BLD-RTO: 0 PER 100 WBC
PLATELET # BLD AUTO: 246 K/UL (ref 150–400)
PMV BLD AUTO: 10.8 FL (ref 8.9–12.9)
POTASSIUM SERPL-SCNC: 3.3 MMOL/L (ref 3.5–5.1)
PROT SERPL-MCNC: 6.2 G/DL (ref 6.4–8.2)
PROT UR-MCNC: 138 MG/DL (ref 0–11.9)
PROT/CREAT UR-RTO: 0.5
RBC # BLD AUTO: 4.09 M/UL (ref 3.8–5.2)
SARS-COV-2, COV2: NORMAL
SERVICE CMNT-IMP: ABNORMAL
SERVICE CMNT-IMP: NORMAL
SODIUM SERPL-SCNC: 139 MMOL/L (ref 136–145)
SOURCE, COVRS: NORMAL
WBC # BLD AUTO: 5.5 K/UL (ref 3.6–11)

## 2021-05-24 PROCEDURE — 99218 HC RM OBSERVATION: CPT

## 2021-05-24 PROCEDURE — 65270000029 HC RM PRIVATE

## 2021-05-24 PROCEDURE — 99285 EMERGENCY DEPT VISIT HI MDM: CPT

## 2021-05-24 PROCEDURE — 74011636637 HC RX REV CODE- 636/637: Performed by: OBSTETRICS & GYNECOLOGY

## 2021-05-24 PROCEDURE — 76819 FETAL BIOPHYS PROFIL W/O NST: CPT | Performed by: OBSTETRICS & GYNECOLOGY

## 2021-05-24 PROCEDURE — 80053 COMPREHEN METABOLIC PANEL: CPT

## 2021-05-24 PROCEDURE — 82962 GLUCOSE BLOOD TEST: CPT

## 2021-05-24 PROCEDURE — 36415 COLL VENOUS BLD VENIPUNCTURE: CPT

## 2021-05-24 PROCEDURE — 0502F SUBSEQUENT PRENATAL CARE: CPT | Performed by: OBSTETRICS & GYNECOLOGY

## 2021-05-24 PROCEDURE — 85025 COMPLETE CBC W/AUTO DIFF WBC: CPT

## 2021-05-24 PROCEDURE — 59025 FETAL NON-STRESS TEST: CPT

## 2021-05-24 PROCEDURE — 74011250637 HC RX REV CODE- 250/637: Performed by: OBSTETRICS & GYNECOLOGY

## 2021-05-24 PROCEDURE — 99221 1ST HOSP IP/OBS SF/LOW 40: CPT | Performed by: OBSTETRICS & GYNECOLOGY

## 2021-05-24 PROCEDURE — 87635 SARS-COV-2 COVID-19 AMP PRB: CPT

## 2021-05-24 PROCEDURE — 82570 ASSAY OF URINE CREATININE: CPT

## 2021-05-24 RX ORDER — INSULIN GLARGINE 100 [IU]/ML
30 INJECTION, SOLUTION SUBCUTANEOUS DAILY
Status: DISCONTINUED | OUTPATIENT
Start: 2021-05-25 | End: 2021-05-30

## 2021-05-24 RX ORDER — MAGNESIUM SULFATE 100 %
4 CRYSTALS MISCELLANEOUS AS NEEDED
Status: DISCONTINUED | OUTPATIENT
Start: 2021-05-24 | End: 2021-06-02 | Stop reason: HOSPADM

## 2021-05-24 RX ORDER — INSULIN GLARGINE 100 [IU]/ML
15 INJECTION, SOLUTION SUBCUTANEOUS DAILY
Status: DISCONTINUED | OUTPATIENT
Start: 2021-05-24 | End: 2021-05-30

## 2021-05-24 RX ORDER — INSULIN GLARGINE 100 [IU]/ML
30 INJECTION, SOLUTION SUBCUTANEOUS DAILY
Status: DISCONTINUED | OUTPATIENT
Start: 2021-05-25 | End: 2021-05-24

## 2021-05-24 RX ORDER — ZOLPIDEM TARTRATE 5 MG/1
5 TABLET ORAL
Status: DISCONTINUED | OUTPATIENT
Start: 2021-05-24 | End: 2021-05-31 | Stop reason: SDUPTHER

## 2021-05-24 RX ORDER — DEXTROSE 50 % IN WATER (D50W) INTRAVENOUS SYRINGE
12.5-25 AS NEEDED
Status: DISCONTINUED | OUTPATIENT
Start: 2021-05-24 | End: 2021-06-02 | Stop reason: HOSPADM

## 2021-05-24 RX ORDER — INSULIN LISPRO 100 [IU]/ML
8 INJECTION, SOLUTION INTRAVENOUS; SUBCUTANEOUS
Status: DISCONTINUED | OUTPATIENT
Start: 2021-05-24 | End: 2021-05-28

## 2021-05-24 RX ORDER — BUTALBITAL, ACETAMINOPHEN AND CAFFEINE 50; 325; 40 MG/1; MG/1; MG/1
1 TABLET ORAL ONCE
Status: COMPLETED | OUTPATIENT
Start: 2021-05-24 | End: 2021-05-24

## 2021-05-24 RX ADMIN — INSULIN LISPRO 8 UNITS: 100 INJECTION, SOLUTION INTRAVENOUS; SUBCUTANEOUS at 17:39

## 2021-05-24 RX ADMIN — BUTALBITAL, ACETAMINOPHEN, AND CAFFEINE 1 TABLET: 50; 325; 40 TABLET ORAL at 13:05

## 2021-05-24 RX ADMIN — INSULIN GLARGINE 15 UNITS: 100 INJECTION, SOLUTION SUBCUTANEOUS at 13:03

## 2021-05-24 RX ADMIN — INSULIN LISPRO 8 UNITS: 100 INJECTION, SOLUTION INTRAVENOUS; SUBCUTANEOUS at 13:04

## 2021-05-24 NOTE — PROGRESS NOTES
1115: Patient arrived to L&D room 215, pt sent from the office for elevated BPs. Pt oriented to room and unit, plan of care discussed with pt, pt verbalized understanding. 1120: 24 hour urine started. 1330: Dr. Julisa Eriksson calling, MD reviewed lab results and stated to cancel 24 hour urine. MD also stated she would like to have the [facility] see the patient today or tomorrow. 1410: Pt taken to [facility] via wheelchair for consult. 1534: Dr. Julisa Guevara given update on pt severe range BP x2 MD stated to recheck BP again and if severe range  will treat with meds. MD stated she will be down to see the pt shortly. 1555: Dr. Julisa Guevara at bedside and made aware of repeat BP of 159/80 x2 MD stated to monitor BP q4 hours while awake. MD also aware of 2 hour pp FSBS of 199, no new orders received. MD putting pt on for induction .     1905: Bedside and Verbal shift change report given to [address] [place] [address] Drive (oncoming nurse) by Mulugeta Recinos RN  (offgoing nurse). Report included the following information SBAR, Kardex, Intake/Output, MAR, Recent Results and Med Rec Status.

## 2021-05-24 NOTE — H&P
History & Physical    Name: Lucien Grady MRN: [id number]  SSN: [id number]    YOB: 1985  Age: 43 y.o. Sex: female        Subjective:     Estimated Date of Delivery: 21  OB History        6    Para   5    Term   5            AB        Living           SAB        TAB        Ectopic        Molar        Multiple        Live Births                    Ms. Grady is admitted with pregnancy at 35w4d for elevated BP. [facility], BP's 150/'s/, and 1+ proteinuria. Prenatal course was complicated by   AMA- NIPS in 2 weeks  H/o PIH - baseline labs and baby ASA rec  GDM- likely DM2--diagnosed in first trimester, on insulin, followed by endocrine and PNC  Alpha thal carrier- FOB needs testing  S/p cholecystectomy at 19 weeks  Please see prenatal records for details. Past Medical History:   Diagnosis Date    Diabetes [facility])      Past Surgical History:   Procedure Laterality Date    HX APPENDECTOMY       Social History     Occupational History    Not on file   Tobacco Use    Smoking status: Never Smoker    Smokeless tobacco: Never Used   Vaping Use    Vaping Use: Never used   Substance and Sexual Activity    Alcohol use: Never    Drug use: Never    Sexual activity: Yes     Partners: Male     Family History   Problem Relation Age of Onset    Hypertension Mother        No Known Allergies  Prior to Admission medications    Medication Sig Start Date End Date Taking? Authorizing Provider   insulin detemir U-100 (LEVEMIR FLEXTOUCH) 100 unit/mL (3 mL) inpn Increase to 30 units at 12 midnight  and 15 units at 12 noon 3/4/21   Daniel Baig MD   insulin aspart U-100 (NOVOLOG) 100 unit/mL (3 mL) inpn Inject 8 units before brunch and 8 units before dinner with sliding scale to max 43 units daily 3/4/21   Daniel Baig MD   Insulin Needles, Disposable, 32 gauge x 5/32\" ndle Use to inject insulin four times daily.  Dx. Code E11.65 2/10/21   Daniel Baig MD   glucose blood VI test strips (True Metrix Glucose Test Strip) strip Use to check blood sugars eight times daily. Dx. Code E11.65 1/29/21   Kevyn Lopez MD   polyethylene glycol (MIRALAX) 17 gram/dose powder Take 17 g by mouth daily. 1/28/21   Lola Neville MD   Blood-Glucose Meter (True Metrix Glucose Meter) misc Use to check blood sugars eight times daily. Dx. Code E11.65 1/13/21   Kevyn Lopez MD   lancets misc Use to check blood sugars eight times daily. Dx. Code E11.65 1/13/21   Kevyn Lopez MD   Blood-Glucose Meter monitoring kit Please dispense insurance preferred meter Use PRN to check blood sugars 1/12/21   Annie Whalen MD   lancets misc Use 4 times daily to check blood sugar please dispense insurance preferred 1/12/21   Annie Whalen MD   glucose blood VI test strips (ASCENSIA AUTODISC VI, ONE TOUCH ULTRA TEST VI) strip Use 4 times daily to check blood sugar please dispense insurance preferred 1/12/21   Annie Whalen MD   aspirin delayed-release 81 mg tablet Take 81 mg by mouth daily. Other, MD Deepak   -iron-FA-om-3s-dha-epa (Vitafol Gummies) 3.33 mg iron- 0.33 mg chew Take 1 Tab by mouth daily. 11/16/20   Annie Whalen MD   doxylamine-pyridoxine, vit B6, (Diclegis) 10-10 mg TbEC DR tablet Take 2 tablets by mouth nightly. May add 1 tab in the morning and 1 tab in the afternoon. 4 tabs max daily. #120 tabs for 30 days 11/16/20   Annie Whalen MD   ondansetron (Zofran ODT) 8 mg disintegrating tablet Take 1 Tab by mouth every eight (8) hours as needed for Nausea. 11/16/20   Annie Whalen MD        Review of Systems: A comprehensive review of systems was negative except for that written in the HPI. Objective:     Vitals: There were no vitals filed for this visit. Physical Exam:  Patient without distress.   Heart: Regular rate and rhythm  Lung: normal respiratory effort  Abdomen: soft, nontender  Membranes:  Intact  Fetal Heart Rate: Reactive, baseline 150, moderate variability, + accels, no decels, no contractions, monitoroed > 30  minutes      Assessment/Plan:     43 y.o.   at 35w4d    Plan: Admit for elevated BP. Picture concerning for Pre-eclampsia. 1. Elevated BP-  Will check serial BP's, pih labs, P:C, and 24 hour urine    2. GDM- possible pre-existing, continue diabetic diet, BS checks, and insulin.     Signed By:  Clayton Chavez MD     May 24, 2021

## 2021-05-24 NOTE — PROGRESS NOTES
Pt doing well, see prenatal flowsheet. C/O headache, /93, 1+ proteinuria, h/o PIH with previous pregnancy. Will send to L&D for pih labs, 24 hour urine, serial bps.

## 2021-05-24 NOTE — PROGRESS NOTES
Indication: AMA Multigravida 3rd Tri O09.523, GDM-Insulin Controlled O24.414/Z79.4.   ____________________________________________________________________________  History: Age: 43 years. Maternal age at [facility]: 43 years. : 6 Para: 5. Current Pregnancy: Pre- pregnancy data: Weight 189 lbs. Height 5 ft 4 ins. BMI 32.5. Obstetric History: Mode of last delivery: Vaginal Delivery x 5.  ____________________________________________________________________________  Dating:  LMP: 20 EDC: 21 GA by LMP: 37w0d  Stated EDC:  EDC: 21 GA by stated EDC: 35w4d  Best Overall Assessment: 21 EDC: 21 Assessed GA: 35w4d  The Best Overall Assessment is based on the stated EDC.  ____________________________________________________________________________  General Evaluation:  Fetal heart activity: present. Fetal heart rate: 152 bpm.   Presentation: cephalic. Fetal movement: visible. Amniotic Fluid: normal. ALMA  24.4 cm. Maximal vertical pocket 7.8 cm. Cord: 3 vessels. Fetal cord insertion site: Normal.   Placenta: posterior. ____________________________________________________________________________  Anatomy Scan:  Pastor gestation. Fetal Anatomy:  Visualized with normal appearance: head, face, abdominal wall, gastrointestinal tract, kidneys, bladder. Heart: The 4-chamber view was obtained but outflow tracts could not be adequately visualized. Summary of Ultrasound Findings:  Transabdominal US. U/S machine: Happy [employer]as E8 Expert. U/S view: limited by adiposity. ____________________________________________________________________________  Fetal Wellbeing Assessment:  Amniotic fluid: normal. ALMA: 24.4 cm. MVP: 7.8 cm. Q1: 7.0 cm. Q2: 4.1 cm. Q3: 7.8 cm. Q4: 5.5 cm. Biophysical Profile: Fetal body movements: normal (2), Fetal tone: normal (2), Fetal breathing movements: normal (2), Amniotic fluid volume: normal (2). Score 8 / 8.      Summary: Reassuring fetal status. ____________________________________________________________________________  Report Summary:  Impression: Ms. Grady is a 32XB [id number] at 35w4 who is referred to L&D today from her obgyn's office due to HA and elevated BP. She reports her HA is now completely resolved. She has had several mild range BPs, less than 6 hrs apart. Pre-e labs are normal, except Upr/cr 0.5. She is known to us due to monitoring for DM2 on Humulin and Levemir managed by endocrine. Today we see cephalic SLIUP with biometry consistent with EDC. Normal fluid and BPP is 8/8. Reassuring fetal surveillance. Recommendations: Continue observation throughout the day to ensure no severe features. If none, patient can be d/c to home with close follow up and pre-e precautions.

## 2021-05-25 LAB
GLUCOSE BLD STRIP.AUTO-MCNC: 105 MG/DL (ref 65–117)
GLUCOSE BLD STRIP.AUTO-MCNC: 106 MG/DL (ref 65–117)
GLUCOSE BLD STRIP.AUTO-MCNC: 162 MG/DL (ref 65–117)
GLUCOSE BLD STRIP.AUTO-MCNC: 164 MG/DL (ref 65–117)
GLUCOSE BLD STRIP.AUTO-MCNC: 164 MG/DL (ref 65–117)
SERVICE CMNT-IMP: ABNORMAL
SERVICE CMNT-IMP: NORMAL
SERVICE CMNT-IMP: NORMAL

## 2021-05-25 PROCEDURE — 74011250637 HC RX REV CODE- 250/637: Performed by: OBSTETRICS & GYNECOLOGY

## 2021-05-25 PROCEDURE — 65270000029 HC RM PRIVATE

## 2021-05-25 PROCEDURE — 77030040361 HC SLV COMPR DVT MDII -B

## 2021-05-25 PROCEDURE — 82962 GLUCOSE BLOOD TEST: CPT

## 2021-05-25 PROCEDURE — 99231 SBSQ HOSP IP/OBS SF/LOW 25: CPT | Performed by: OBSTETRICS & GYNECOLOGY

## 2021-05-25 PROCEDURE — 74011636637 HC RX REV CODE- 636/637: Performed by: OBSTETRICS & GYNECOLOGY

## 2021-05-25 PROCEDURE — 59025 FETAL NON-STRESS TEST: CPT

## 2021-05-25 PROCEDURE — 99218 HC RM OBSERVATION: CPT

## 2021-05-25 PROCEDURE — 59025 FETAL NON-STRESS TEST: CPT | Performed by: OBSTETRICS & GYNECOLOGY

## 2021-05-25 RX ORDER — LABETALOL 100 MG/1
100 TABLET, FILM COATED ORAL 2 TIMES DAILY
Status: DISCONTINUED | OUTPATIENT
Start: 2021-05-25 | End: 2021-05-26

## 2021-05-25 RX ORDER — ACETAMINOPHEN 325 MG/1
650 TABLET ORAL
Status: DISCONTINUED | OUTPATIENT
Start: 2021-05-25 | End: 2021-05-31 | Stop reason: SDUPTHER

## 2021-05-25 RX ORDER — LABETALOL 100 MG/1
100 TABLET, FILM COATED ORAL 2 TIMES DAILY
Status: DISCONTINUED | OUTPATIENT
Start: 2021-05-25 | End: 2021-05-25

## 2021-05-25 RX ADMIN — INSULIN GLARGINE 15 UNITS: 100 INJECTION, SOLUTION SUBCUTANEOUS at 12:08

## 2021-05-25 RX ADMIN — LABETALOL HYDROCHLORIDE 100 MG: 100 TABLET, FILM COATED ORAL at 11:04

## 2021-05-25 RX ADMIN — INSULIN GLARGINE 30 UNITS: 100 INJECTION, SOLUTION SUBCUTANEOUS at 00:10

## 2021-05-25 RX ADMIN — LABETALOL HYDROCHLORIDE 100 MG: 100 TABLET, FILM COATED ORAL at 17:57

## 2021-05-25 RX ADMIN — INSULIN LISPRO 8 UNITS: 100 INJECTION, SOLUTION INTRAVENOUS; SUBCUTANEOUS at 08:29

## 2021-05-25 RX ADMIN — INSULIN LISPRO 8 UNITS: 100 INJECTION, SOLUTION INTRAVENOUS; SUBCUTANEOUS at 16:39

## 2021-05-25 NOTE — PROGRESS NOTES
7:08 PM  SBAR report received from Arnulfo Fay RN. Assumed care of the patient at this time. 8:14 PM  Assessment with VS done. The patient states she feels like her BS is low. BS is 71. Juice and cookies given to the patient. Patient placed on the monitor for her NST. Call bell in reach. 12:21 AM  Spoke to Dr. Obdulio Singer about the patients blood pressures. Reviewed the patients earlier trend of her b/p's going up and down without medication. Informed her that I had set the b/p's to cycle. Per Dr. Obdulio Singer, she said to cycle the b/p's for no more than an hour and if they do not trend down to give the patient Nifedipine. 7:23 AM  SBAR report given to Khushboo Pierson RN. Care of the patient turned over at this time.

## 2021-05-25 NOTE — PROGRESS NOTES
AntePartum High Risk Pregnancy Note    Rosaline Missed  35w5d    Patient admitted for preeclampsia 35w5d Estimated Date of Delivery: 21 states she does not  have  headache  and blurred vision, sob, or cp. Reports good fetal movement. Vitals:    Patient Vitals for the past 24 hrs:   BP Temp Pulse Resp SpO2   21 1442 (!) 135/92 98.5 °F (36.9 °C) 72 16    21 1147 (!) 150/83 98.7 °F (37.1 °C) 70 16 100 %   21 1101 (!) 149/82  72     21 0731 (!) 141/78 98.6 °F (37 °C) 71 16    21 0116 (!) 143/69  82     21 0101 (!) 157/80  75     21 0046 (!) 165/83  68     21 0031 (!) 146/78  69     21 0013 (!) 164/76 97.5 °F (36.4 °C) 71 16    21 2014 138/75 97.8 °F (36.6 °C) 77 15    21 1551 (!) 159/81  73       Temp (24hrs), Av.2 °F (36.8 °C), Min:97.5 °F (36.4 °C), Max:98.7 °F (37.1 °C)    I&O:   No intake/output data recorded. No intake/output data recorded. Exam:  Patient without distress.                Abdomen soft, non-tender               Fundus soft and non tender               Uterine Activity: None               NST:  Reactive, baseline 150, moderate variability, + accels, no decels, no contractions, monitoroed > 30  minutes             Labs:   Recent Results (from the past 24 hour(s))   SARS-COV-2    Collection Time: 21  4:25 PM   Result Value Ref Range    SARS-CoV-2 Please find results under separate order     COVID-19 RAPID TEST    Collection Time: 21  4:25 PM   Result Value Ref Range    Specimen source Nasopharyngeal      COVID-19 rapid test Not detected NOTD     GLUCOSE, POC    Collection Time: 21  8:10 PM   Result Value Ref Range    Glucose (POC) 71 65 - 117 mg/dL    Performed by Tomi PEREZ, POC    Collection Time: 21  7:30 AM   Result Value Ref Range    Glucose (POC) 106 65 - 117 mg/dL    Performed by MAR Ponce Collection Time: 21 11:07 AM   Result Value Ref Range    Glucose (POC) 162 (H) 65 - 117 mg/dL    Performed by Tim Buchanan    GLUCOSE, POC    Collection Time: 21 12:01 PM   Result Value Ref Range    Glucose (POC) 164 (H) 65 - 117 mg/dL    Performed by Tim Buchanan    GLUCOSE, POC    Collection Time: 21  2:44 PM   Result Value Ref Range    Glucose (POC) 164 (H) 65 - 117 mg/dL    Performed by Radha Kent and Plan:     43 y.o.   at 35w5d    Pre-elcampsia- A few severe range BP's overnight, which all came down after repeating. Labetalol 100 mg po bid started and BP's all mild range since. Will observe overnight and if all BP's remain mild then will d/c to home in the morning with close follow-up. GDM (likely Type2)- will consult diabetes educator re: elevated PP glucose. Will f/u with endocrine and PNC also.

## 2021-05-25 NOTE — PROGRESS NOTES
0720: Bedside, Verbal and Written shift change report given to CHARO Hunt (oncoming nurse) by John Lee RN (offgoing nurse). Report included the following information SBAR, Kardex, Intake/Output, MAR, Accordion and Recent Results. Pt waking up at this time, resting in bed    0829: This RN administering pt insulin at this time. Pt breakfast at bedside, pt about to eat. No complaints. This RN to place pt on EFM for NST after she is finished eating    0900: This RN placing pt on EFM for NST following pt finishing her breakfast    0924: This RN taking pt off of EFM following reactive NST. No complaints by pt at this time. Pt resting. 1049: Dr. Emmanuel Dailey reviewing pt bp readings. MD VORB 100mg labetalol BID, start at this time. MD to go discuss POC with pt    1110: This RN applying SCDs to pt lower extremities due to MD order for pt to stay observation until at least tomorrow. 1125: This RN notifying Dr. Emmanuel Dailey of pt complaint of leg soreness with \"being in the bed this morning    1147: Pt informing this RN that she feels \"a little lightheaded. \" This RN assessing bp  - 150/83 and HR 70 and O2 sat 100%. Pt stating that she is not dizzy and she declines a headache or any pain. Pt confirming feeling fetal movement at this time. 1151: This RN notifying Dr. Emmanuel Dailey of pt complaint of slight lightheadedness    1201: Pt stating that her headache is gone and that the SCDs eliminated her leg soreness, but asking for her blood glucose to be assessed prior to insulin administration in case she is feeling slightly sick due to her BG, BG is 164. Pt requesting her insulin be given at this time and stating that she will eat her lunch now in case she is feeling lightheaded because she is hungry    1225: Pt states that she is feeling better an no longer lightheaded    1320: This RN rounding on pt at this time, pt resting in bed, no complaints, about to take a nap    1515: This RN notifying Dr. Emmanuel Dailey of pt BG readings. [facility] Medicine  Progress Note    Patient Name: Izabella Fulton  MRN: [id number]  Patient Class: IP- Inpatient   Admission Date: 1/15/2020  Length of Stay: 1 days  Attending Physician: Amilcar Weir MD  Primary Care Provider: Corinne Fletcher NP        Subjective:     Principal Problem:Acute exacerbation of CHF (congestive heart failure)        HPI:  Izabella Fulton is a 84 y.o. female with HTN, CKD stage IV on torsemide twice a week, and prior TIA on daily Aspirin 81 mg  presented to the ED with  onset of worsening SOB with swelling of bilateral  lower extremity worsening over the last 2 days with associated weight gain.  Patient states that she has been doing well in terms of her breathing for the last 2 weeks since her rib fracture on December 24, 2019.  Patient states that she has been on torsemide twice a week as per renal recommendation and has been doing well and has not had any swelling of the leg prior to this.  She sustained multiple left-sided rib fractures and a moderate pleural effusion status post fall less than 3 weeks ago on 12/24.  Patient states that she went to the hospital on 26th of December and decided to leave the hospital despite recommendations to get admitted.  Patient has been getting multiple chest x-rays since her ER visit on the 26th of December and chest x-rays have been stable.  The patient had a repeat CXR obtained 10 days ago on 1/5 revealing no change in the effusion. At which time, she reports having a 8 lbs weight gain and increased SOB. Yesterday, the patient had an OP CXR that revealed no changes in the pleural effusion. She states her SOB worsened significantly in the last 24 hours.  No change in medications in the last 2 weeks.  The patient reports her rib pain is nearly resolved. She denies chest pain palpitations cough fever chills.  No  history of dialysis or taking Lasix, fever, cough, chest pain, or any other symptoms at this time. No  MD PHILLIPS via [employer] consult to diabetes treatment team.    1910: Bedside, Verbal and Written shift change report given to KWESI Rowe (oncoming nurse) by Daisy Greene RN (offgoing nurse). Report included the following information SBAR, Kardex, Intake/Output, MAR, Accordion and Recent Results. cardiopulmonary PSHx.      The history is provided by the patient.      Overview/Hospital Course:  Patient presented with insidious onset of worsening SOB with swelling of bilateral  lower extremity worsening over the last 2 days with associated weight gain.  patient was started on Lasix IV twice daily.  Nephrology was consulted patient has been following with up with Dr. Shultz and has been managing her CKD stage 4 and her Lasix dose.  Cardiology has been consulted for worsening of her CHF.  Echo was done currently waiting final report preliminary report shows normal ejection fraction.  Patient also has pleural effusion bilaterally pulmonary has been consulted for possibility of bedside thoracocentesis if needed.  A large pleural effusion was seen on the echo .   patient's respiratory status improved after the Lasix.  Continued to feel short of breath not at her baseline currently on 2 L nasal cannula oxygen    Interval History:  Patient respiratory status improved after 2 doses of Lasix.  Denies any chest pain palpitations cough fever chills    Review of Systems   Constitutional: Negative for appetite change, chills, fatigue and fever.   HENT: Positive for congestion. Negative for hearing loss, rhinorrhea, sore throat, trouble swallowing and voice change.    Respiratory: Positive for cough and shortness of breath. Negative for chest tightness and wheezing.    Cardiovascular: Positive for leg swelling. Negative for chest pain and palpitations.   Gastrointestinal: Negative for abdominal pain, blood in stool, diarrhea, nausea and vomiting.   Genitourinary: Negative for difficulty urinating, frequency, hematuria and urgency.   Musculoskeletal: Negative for back pain, joint swelling and neck stiffness.   Skin: Negative for pallor and rash.   Neurological: Negative for tremors, seizures, syncope, speech difficulty, weakness, numbness and headaches.   Hematological: Negative for adenopathy.   Psychiatric/Behavioral:  Negative for agitation, behavioral problems, confusion and sleep disturbance.     Objective:     Vital Signs (Most Recent):  Temp: 98.3 °F (36.8 °C) (01/16/20 1548)  Pulse: 80 (01/16/20 1548)  Resp: 18 (01/16/20 1548)  BP: (!) 155/68 (01/16/20 1548)  SpO2: 96 % (01/16/20 1548) Vital Signs (24h Range):  Temp:  [96 °F (35.6 °C)-98.3 °F (36.8 °C)] 98.3 °F (36.8 °C)  Pulse:  [55-80] 80  Resp:  [16-20] 18  SpO2:  [91 %-97 %] 96 %  BP: (144-206)/(65-89) 155/68     Weight: 56.2 kg (124 lb)  Body mass index is 25.04 kg/m².    Intake/Output Summary (Last 24 hours) at 1/16/2020 1631  Last data filed at 1/15/2020 1800  Gross per 24 hour   Intake 480 ml   Output --   Net 480 ml      Physical Exam   Constitutional: She is oriented to person, place, and time. She appears well-developed and well-nourished. No distress.   HENT:   Head: Normocephalic and atraumatic.   Right Ear: External ear normal.   Left Ear: External ear normal.   Nose: Nose normal.   Mouth/Throat: Oropharynx is clear and moist.   Eyes: Pupils are equal, round, and reactive to light. Conjunctivae and EOM are normal. Right eye exhibits no discharge. Left eye exhibits no discharge. No scleral icterus.   Neck: Normal range of motion. Neck supple. No thyromegaly present.   Cardiovascular: Normal rate, regular rhythm, normal heart sounds and intact distal pulses.   No murmur heard.  Pulmonary/Chest: No stridor. No respiratory distress. She has no wheezes. She has rales.   Abdominal: Soft. Bowel sounds are normal. She exhibits no distension. There is no tenderness.   Musculoskeletal: She exhibits edema. She exhibits no tenderness.   Edema to the knee joint bilaterally   Lymphadenopathy:     She has no cervical adenopathy.   Neurological: She is alert and oriented to person, place, and time. No cranial nerve deficit or sensory deficit. She exhibits normal muscle tone. Coordination normal.   Skin: Skin is warm and dry. Capillary refill takes less than 2 seconds. No rash  noted. She is not diaphoretic. No erythema.   Psychiatric: She has a normal mood and affect. Her behavior is normal. Judgment and thought content normal.   Nursing note and vitals reviewed.      Significant Labs:   BMP:   Recent Labs   Lab 01/16/20  0420 01/16/20  0421   GLU 71  --      --    K 4.5  --    *  --    CO2 20*  --    BUN 56*  --    CREATININE 2.0*  --    CALCIUM 8.5*  --    MG  --  2.2     CBC:   Recent Labs   Lab 01/15/20  1329   WBC 6.34   HGB 12.6   HCT 38.7          Significant Imaging: I have reviewed all pertinent imaging results/findings within the past 24 hours.      Assessment/Plan:      * Acute exacerbation of CHF (congestive heart failure)  Supplemental O2 via nasal canula; titrate O2 saturation to >92%.  Results for FABIOLA CHAVEZ (MRN [id number]) as of 1/16/2020 16:32   Ref. Range 1/15/2020 13:29 1/15/2020 17:04 1/15/2020 23:58 1/16/2020 04:21   Troponin I Latest Ref Range: 0.000 - 0.026 ng/mL 0.030 (H) 0.028 (H) 0.030 (H) 0.028 (H)     Diuretic administration Lasix 20 IV b.i.d..   Monitor electrolytes for hypokalemia and hypomagnesemia.  Consulted Cardiology Consultation follow-up with recommendations  2-D Echocardiogram for evaluation of left ventricle systolic function or any valvular heart disease.  Daily weights.  Strict I/Os.  Fluid restriction.  Na restriction <2g/d.          Pleural effusion  Bilateral pleural effusion most likely secondary to CHF exacerbation  Chest x-ray shows Moderate left and small right pleural effusions without significant change with accompanying basilar atelectasis or infiltrate.  Will consult pulmonology for bedside thoracocentesis if needed will get IR to do thoracocentesis  Will continue treating CHF and will get a repeat the chest x-ray in the morning      Hypertensive emergency  Will will start the patient on isosorbide and hydralazine combination  Will hold the metoprolol as under acute exacerbation of congestive heart failure.  We will  review patient's respiratory status tomorrow and will consider resuming beta-blocker  The end-organ damage and this patient may be the heart considering that elevated troponins.      Elevated troponin   secondary to demand ischemia  Follow-up with Cardiology recommendations        Primary osteoarthritis involving multiple joints   will continue Tylenol for pain related to osteoarthritis    Hypertensive kidney disease with stage 4 chronic kidney disease  Renal consulted  Will continue to monitor kidney function  Creatine stable for now. BMP reviewed- noted Estimated Creatinine Clearance: 17.9 mL/min (A) (based on SCr of 2 mg/dL (H)). according to latest data. Monitor UOP and serial BMP and adjust therapy as needed. Renally dose meds.  Results for FABIOLA CHAVEZ (MRN [id number]) as of 1/16/2020 16:32   Ref. Range 9/19/2019 08:42 12/26/2019 12:45 1/15/2020 13:29 1/16/2020 04:20   BUN, Bld Latest Ref Range: 8 - 23 mg/dL 56 (H) 53 (H) 51 (H) 56 (H)   Creatinine Latest Ref Range: 0.5 - 1.4 mg/dL 1.85 (H) 2.3 (H) 1.9 (H) 2.0 (H)               Hyperlipidemia  Will continue home meds      Ulcerative colitis  Currently stable has not had a flare for over a year      Hypothyroid  Resume home meds home dose of levothyroxine      Essential hypertension  Will continue to monitor blood pressure  Currently blood pressure is 220/80  Patient received 10 mg hydralazine in the emergency room if the patient remains persistently elevated will consider giving another 10mg  Hydralazine  Will restart patient's hydralazine 75 p.o. and will add isosorbide dinitrate 40 mg 3 times a day  Will hold off on metoprolol because of the acute CHF        VTE Risk Mitigation (From admission, onward)         Ordered     heparin (porcine) injection 5,000 Units  Every 8 hours      01/15/20 1623     IP VTE HIGH RISK PATIENT  Once      01/15/20 1623     Place sequential compression device  Until discontinued      01/15/20 1623                      Amilcar  MD [place]  Department of Hospital Medicine   [facility]

## 2021-05-25 NOTE — PROGRESS NOTES
5/25/2021  3:39 PM    Observation notice provided in writing to patient and/or caregiver as well as verbal explanation of the policy. Patients who are in outpatient status also receive the Observation notice. Patient has received notice and or patient representative has received via secure email, fax, or certified mail based on patient representative's preference.      Arnaldo Fall

## 2021-05-25 NOTE — PROGRESS NOTES
1910 Bedside and Verbal shift change report given to KWESI Russo RN (oncoming nurse) by Sanchez Reveles RN (offgoing nurse). Report included the following information SBAR, Kardex, Intake/Output, MAR and Recent Results. 2020 EFM applied to pt for NST. Call bell within reach    2035 RN adjusted US    2055 NST verified reactive with Brigida Maradiaga RN    2155 pt requesting to shower. IV covered appropriately. Family member at bedside now    0710 Bedside and Verbal shift change report given to KWESI Castro RN (oncoming nurse) by KWESI Russo RN (offgoing nurse). Report included the following information SBAR, Kardex, Intake/Output, MAR and Recent Results.

## 2021-05-26 PROBLEM — Z34.90 PREGNANCY: Status: ACTIVE | Noted: 2021-05-26

## 2021-05-26 LAB
GLUCOSE BLD STRIP.AUTO-MCNC: 102 MG/DL (ref 65–117)
GLUCOSE BLD STRIP.AUTO-MCNC: 132 MG/DL (ref 65–117)
GLUCOSE BLD STRIP.AUTO-MCNC: 149 MG/DL (ref 65–117)
GLUCOSE BLD STRIP.AUTO-MCNC: 207 MG/DL (ref 65–117)
SERVICE CMNT-IMP: ABNORMAL
SERVICE CMNT-IMP: NORMAL

## 2021-05-26 PROCEDURE — 65270000029 HC RM PRIVATE

## 2021-05-26 PROCEDURE — 59025 FETAL NON-STRESS TEST: CPT | Performed by: OBSTETRICS & GYNECOLOGY

## 2021-05-26 PROCEDURE — 74011250637 HC RX REV CODE- 250/637: Performed by: OBSTETRICS & GYNECOLOGY

## 2021-05-26 PROCEDURE — 99233 SBSQ HOSP IP/OBS HIGH 50: CPT | Performed by: CLINICAL NURSE SPECIALIST

## 2021-05-26 PROCEDURE — 99232 SBSQ HOSP IP/OBS MODERATE 35: CPT | Performed by: OBSTETRICS & GYNECOLOGY

## 2021-05-26 PROCEDURE — 74011636637 HC RX REV CODE- 636/637: Performed by: OBSTETRICS & GYNECOLOGY

## 2021-05-26 PROCEDURE — 82962 GLUCOSE BLOOD TEST: CPT

## 2021-05-26 RX ORDER — LABETALOL 200 MG/1
200 TABLET, FILM COATED ORAL 2 TIMES DAILY
Status: DISCONTINUED | OUTPATIENT
Start: 2021-05-26 | End: 2021-05-31

## 2021-05-26 RX ORDER — HYDRALAZINE HYDROCHLORIDE 20 MG/ML
5 INJECTION INTRAMUSCULAR; INTRAVENOUS
Status: DISPENSED | OUTPATIENT
Start: 2021-05-26 | End: 2021-05-27

## 2021-05-26 RX ORDER — LABETALOL 100 MG/1
100 TABLET, FILM COATED ORAL ONCE
Status: COMPLETED | OUTPATIENT
Start: 2021-05-26 | End: 2021-05-26

## 2021-05-26 RX ORDER — HYDRALAZINE HYDROCHLORIDE 20 MG/ML
10 INJECTION INTRAMUSCULAR; INTRAVENOUS
Status: ACTIVE | OUTPATIENT
Start: 2021-05-26 | End: 2021-05-27

## 2021-05-26 RX ORDER — BUTALBITAL, ACETAMINOPHEN AND CAFFEINE 50; 325; 40 MG/1; MG/1; MG/1
2 TABLET ORAL
Status: DISCONTINUED | OUTPATIENT
Start: 2021-05-26 | End: 2021-06-02 | Stop reason: HOSPADM

## 2021-05-26 RX ORDER — BUTALBITAL, ACETAMINOPHEN AND CAFFEINE 50; 325; 40 MG/1; MG/1; MG/1
1 TABLET ORAL
Status: DISCONTINUED | OUTPATIENT
Start: 2021-05-26 | End: 2021-05-26

## 2021-05-26 RX ADMIN — BUTALBITAL, ACETAMINOPHEN, AND CAFFEINE 2 TABLET: 50; 325; 40 TABLET ORAL at 21:23

## 2021-05-26 RX ADMIN — LABETALOL HYDROCHLORIDE 100 MG: 100 TABLET, FILM COATED ORAL at 09:05

## 2021-05-26 RX ADMIN — ACETAMINOPHEN 650 MG: 325 TABLET ORAL at 16:12

## 2021-05-26 RX ADMIN — INSULIN GLARGINE 15 UNITS: 100 INJECTION, SOLUTION SUBCUTANEOUS at 13:16

## 2021-05-26 RX ADMIN — LABETALOL HYDROCHLORIDE 200 MG: 200 TABLET, FILM COATED ORAL at 19:32

## 2021-05-26 RX ADMIN — INSULIN LISPRO 8 UNITS: 100 INJECTION, SOLUTION INTRAVENOUS; SUBCUTANEOUS at 19:32

## 2021-05-26 RX ADMIN — INSULIN LISPRO 8 UNITS: 100 INJECTION, SOLUTION INTRAVENOUS; SUBCUTANEOUS at 09:07

## 2021-05-26 RX ADMIN — INSULIN GLARGINE 30 UNITS: 100 INJECTION, SOLUTION SUBCUTANEOUS at 00:01

## 2021-05-26 RX ADMIN — LABETALOL HYDROCHLORIDE 100 MG: 100 TABLET, FILM COATED ORAL at 10:48

## 2021-05-26 NOTE — DIABETES MGMT
[address] [facility]    CLINICAL NURSE SPECIALIST CONSULT NOTE    Presentation   Bo Grady is a 43 y.o. female admitted 5/25/21 with elevated blood pressure during pregnancy. HX:   Past Medical History:   Diagnosis Date    Antepartum mild preeclampsia 5/24/2021    Diabetes [facility])        Current clinical course has been uncomplicated. Diabetes: Patient has known gestational diabetes. Patient reports no diabetes outside of pregnancy, reports GDM with pregnancies 4-6 (2010; 2019; 2021). Home regimen during this pregnancy is Lantus 15 units at noon and 30 units at midnight, Novolog 8 units with breakfast and dinner. Family history positive for diabetes - mother, father, older brother, all grandparents. Patient sees endocrinologist Dr. Shaniqua Herron.   Admission  and A1c 5.7% (3/4/21) indicate poor diabetes control. Consulted by Provider for advanced diabetes nursing assessment and care, specifically related to     [x] Home management assessment      Diabetes-related medical history  Acute complications  denies  Neurological complications  denies  Microvascular disease  denies  Macrovascular disease  denies  Other associated conditions     denies    Diabetes medication history  Drug class Currently in use Discontinued Never used   Biguanide      DPP-4 inhibitor       Sulfonylurea      Thiazolidinedione      GLP-1 RA      SGLT-2 inhibitors      Basal insulin Lantus 15 units noon  Lantus 30 units midnight     Bolus insulin Novolog 8 units breakfast and dinner     Fixed Dose  Combinations        Subjective   Patient laying in bed, alert and oriented. Patient without complaints at this time. Patient reports that her BG's have all \"been in the normal range\" with her current treatment.       Patient reports the following home diabetes self-care practices:  Eating pattern  [x] Breakfast Eggs, sometimes toast  [x] Lunch  [place] (usually turkey)  [x] Dinner  \"something light if I eat dinner\" chicken breast w/ salad   [x] Beverages Water and coffee  Physical activity pattern  Patient reports \"I have a big house and 5 kids, that keeps me plenty active especially with an 21 month old. \"  Monitoring pattern  Patient reports checks fasting BG and then 2 hours after breakfast, lunch and dinner. Patient reports \"sometimes I check at bedtime as well. \"  Taking medications pattern  [x] Consistent administration  [x] Affordable    Social determinants of health impacting diabetes self-management practices   Patient denies any issues or concerns at this time. Objective   Physical exam  General Alert, oriented and in no acute distress. Conversant and cooperative. Vital Signs   Visit Vitals  BP (!) 153/62 (BP 1 Location: Left upper arm, BP Patient Position: At rest;Semi fowlers)   Pulse 82   Temp 98.4 °F (36.9 °C)   Resp 16   Ht 5' 4\" (1.626 m)   Wt 93.9 kg (207 lb)   SpO2 97%   BMI 35.53 kg/m²     Skin  Warm and dry. No Acanthosis noted along neckline. No lipohypertrophy or lipoatrophy noted at injection sites   Heart   Regular rate and rhythm.  No murmurs, rubs or gallops  Lungs  Clear to auscultation without rales or rhonchi  Extremities No foot wounds        Laboratory  Lab Results   Component Value Date/Time    Hemoglobin A1c (POC) 5.7 03/04/2021 11:07 AM     No results found for: LDL, LDLC, DLDLP  Lab Results   Component Value Date/Time    Creatinine 0.28 (L) 05/24/2021 11:30 AM     Lab Results   Component Value Date/Time    Sodium 139 05/24/2021 11:30 AM    Potassium 3.3 (L) 05/24/2021 11:30 AM    Chloride 110 (H) 05/24/2021 11:30 AM    CO2 20 (L) 05/24/2021 11:30 AM    Anion gap 9 05/24/2021 11:30 AM    Glucose 133 (H) 05/24/2021 11:30 AM    BUN 6 05/24/2021 11:30 AM    Creatinine 0.28 (L) 05/24/2021 11:30 AM    BUN/Creatinine ratio 21 (H) 05/24/2021 11:30 AM    GFR est AA >60 05/24/2021 11:30 AM    GFR est non-AA >60 05/24/2021 11:30 AM    Calcium 8.0 (L) 05/24/2021 11:30 AM    Bilirubin, total 0.6 05/24/2021 11:30 AM    Alk. phosphatase 121 (H) 05/24/2021 11:30 AM    Protein, total 6.2 (L) 05/24/2021 11:30 AM    Albumin 2.6 (L) 05/24/2021 11:30 AM    Globulin 3.6 05/24/2021 11:30 AM    A-G Ratio 0.7 (L) 05/24/2021 11:30 AM    ALT (SGPT) 9 (L) 05/24/2021 11:30 AM     Lab Results   Component Value Date/Time    ALT (SGPT) 9 (L) 05/24/2021 11:30 AM         Factors affecting BG management  Factor Dose Comments   Nutrition:  Carb-controlled meals   60 grams/meal   Gestational diabetic diet ordered   Drugs:  Vasopressor load  Steroids  HIV   Epogen  Blood transfusion(s)  Other: n/a   n/a  n/a  n/a  n/a  n/a         A1cs inaccurate  A1cs inaccurate   Pain 0/10    Infection n/a    Other: n/a n/a      Blood glucose pattern          Evaluation   This 43year old female, with gestational diabetes, did achieve diabetes control prior to admission (PTA), as evidenced by admission BG of 199 and A1c of 5.7%. Based on assessment of diabetes self-care practices, interventions that warrant action while hospitalized include:   [x] Healthy Eating     [x] Being Active     [x] Reducing Risks      The patient would also benefit from diabetes self-management education and support (DSMES) after discharge. During this hospitalization, the patient has not achieved inpatient blood glucose target during pregnancy of FBG < 90 mg/dl and 2 hours post prandial of < 130 mg/dL. . BG's have ranged 105-164 over the past 24 hours. Factors that have played a role include:    [x] Compromised insulin absorption or delivery      Basal insulin is in use. Lantus 15 units at noon and 30 units at midnight currently ordered. Bolus insulin is in use. Humalog 8 units with breakfast and dinner currently ordered. Patient is eating meals. Corrective insulin isn't in use. To optimize BG control and support a positive health outcome, would utilize the Subcutaneous Insulin Order set (9763). Impression:   It is suspected that patient will require bolus insulin with each meal (currently only gets with breakfast and dinner) as well as adding corrective insulin to cover any BG's that remain >130. Assessment and Plan   Nursing Diagnosis Risk for unstable blood glucose pattern   Nursing Intervention Domain 5256 Decision-making Support   Nursing Interventions Examined current inpatient diabetes control   Explored factors facilitating and impeding inpatient management  Identified self-management practices impeding diabetes control  Explored corrective strategies with patient and responsible inpatient provider   Informed patient of rational for insulin strategy while hospitalized       Recommendations   Recommend:    [x] Use of Subcutaneous Insulin Order set (1935)  Insulin Use Recommendation   Basal                                      (Based on weight, BMI & GFR) Addresses basic metabolic needs Continue Lantus 15 units at noon and 30 units at midnight. Nutritional                                      (Based on CHO load) Addresses nutrition interventions Change bolus to Humalog 8 units with all meals. Corrective                                       (Offset gaps in dosing) Useful in adjusting insulin dosing Add Humalog correction 2 hours after meals:    CORRECTIONAL SCALE only For Blood Sugar (mg/dl) of :             140-199=2 units            200-249=3 units  250-299=5 units  300-349=7 units  350 or greater = Call MD  Give in addition to basal medications. Do Not Hold for NPO    BEDTIME CORRECTIONAL sliding scale when scheduled:  200-249=2 units  250-299=3 units   300-349=4 units  350 or greater = Call MD  Give in addition to basal medications. Do Not Hold for NPO       [x] Referral to    [x] Diabetes Self-Management Training through [facility] (Phone [phone] to schedule appointment)    Billing Code(s)   Total time spent with patient: 35 Minutes.   I personally reviewed medical record, including notes, data and current medications, and examined the patient at bedside before making care recommendations (circumstances permitting).        [x] [id number] IP subsequent hospital care - 35 minutes    Chuck Schmidt DNP, APRN-BC, [address] [place] [address], [facility]  Diabetes Clinical Nurse Specialist  [facility]  Access via [address] [place] [address]

## 2021-05-26 NOTE — PROGRESS NOTES
AntePartum High Risk Pregnancy Note    Rosaline Missed  35w6d    Patient admitted for preeclampsia 35w6d Estimated Date of Delivery: 21 states she does not  have  h/a, blurred vision, sob, or cp. Vitals:    Patient Vitals for the past 24 hrs:   BP Temp Pulse Resp SpO2   21 0934 (!) 153/62 98.4 °F (36.9 °C) 82 16    21 0915 (!) 161/76  70     21 0905 (!) 174/89  69     21 0629 136/64  74 14    21 0007 (!) 146/76  72 14    21 2017 (!) 157/81 98.4 °F (36.9 °C) 64 14 97 %   21 1757 136/79  80     21 1442 (!) 135/92 98.5 °F (36.9 °C) 72 16    21 1147 (!) 150/83 98.7 °F (37.1 °C) 70 16 100 %   21 1101 (!) 149/82  72       Temp (24hrs), Av.5 °F (36.9 °C), Min:98.4 °F (36.9 °C), Max:98.7 °F (37.1 °C)    I&O:   No intake/output data recorded. No intake/output data recorded. Exam:  Patient without distress.                Abdomen soft, non-tender               Fundus soft and non tender               NST:  Reactive, baseline 150, moderate variability, + accels, no decels, no contractions, monitoroed > 30  minutes          Labs:   Recent Results (from the past 24 hour(s))   GLUCOSE, POC    Collection Time: 21 11:07 AM   Result Value Ref Range    Glucose (POC) 162 (H) 65 - 117 mg/dL    Performed by Miguel Ángel Carcamo    GLUCOSE, POC    Collection Time: 21 12:01 PM   Result Value Ref Range    Glucose (POC) 164 (H) 65 - 117 mg/dL    Performed by Shashank Sebastian, POC    Collection Time: 21  2:44 PM   Result Value Ref Range    Glucose (POC) 164 (H) 65 - 117 mg/dL    Performed by Miguel Ángel Carcamo    GLUCOSE, POC    Collection Time: 21  8:16 PM   Result Value Ref Range    Glucose (POC) 105 65 - 117 mg/dL    Performed by Kamilla 11, POC    Collection Time: 21  6:28 AM   Result Value Ref Range    Glucose (POC) 149 (H) 65 - 117 mg/dL    Performed by SONIA DOLAN        Assessment and Plan:     43 y.o.   at 35w6d    1. Pre-elcampsia-BP's improved but 2 severe range this morning. Will increase labetalol to 200 bid and continue inpatient observation. IOL scheduled  at 37 weeks     2. GDM (likely Type2)- appreciate diabetes educator consultation, will also f/u with endocrine recs regarding adjusting insulin dose due to elevated postprandial BS.

## 2021-05-26 NOTE — PROGRESS NOTES
0710: Bedside, Verbal and Written shift change report given to LIONEL Jain RN (oncoming nurse) by Janie Bolaños RN (offgoing nurse). Report included the following information SBAR, Kardex, Intake/Output, MAR, Recent Results, Med Rec Status and Alarm Parameters . 0800: RN at bedside, pt sleeping. Pt instructed to call RN prior to eating breakfast. Pt verbalizes understanding. 5030: MD aware of 2 severe BP's. Orders to recheck one more time and start hydralazine protocol if still elevated. 1028: TORB from Dr. Atul Dial to increase Labetalol dose to 200 BID and give another 100mg now. TORB to consult endocrinology d/t increased post-prandial blood sugars. 1110: RN Camille pt's endocrinologist Dr. John Clay with [facility]. Attempted to call office, busy signal.     1200: Per pt, she usually takes Lantus prior to eating lunch. Pt states she will call when she is ready to eat. 1316: RN at bedside with meal tray present. Pt reports she is ready to eat.     1604: RN at bedside. Pt has not yet eaten lunch but stated she wants to check her blood sugar before eating. 1927: Bedside, Verbal and Written shift change report given to Ihsan Wick RN (oncoming nurse) by Ben Pisano RN (offgoing nurse). Report included the following information SBAR, Kardex, Intake/Output, MAR, Recent Results, Med Rec Status, Alarm Parameters  and Quality Measures.

## 2021-05-27 LAB
GLUCOSE BLD STRIP.AUTO-MCNC: 116 MG/DL (ref 65–117)
GLUCOSE BLD STRIP.AUTO-MCNC: 131 MG/DL (ref 65–117)
GLUCOSE BLD STRIP.AUTO-MCNC: 180 MG/DL (ref 65–117)
GLUCOSE BLD STRIP.AUTO-MCNC: 80 MG/DL (ref 65–117)
SERVICE CMNT-IMP: ABNORMAL
SERVICE CMNT-IMP: ABNORMAL
SERVICE CMNT-IMP: NORMAL
SERVICE CMNT-IMP: NORMAL

## 2021-05-27 PROCEDURE — 65270000029 HC RM PRIVATE

## 2021-05-27 PROCEDURE — 74011250637 HC RX REV CODE- 250/637: Performed by: OBSTETRICS & GYNECOLOGY

## 2021-05-27 PROCEDURE — 99231 SBSQ HOSP IP/OBS SF/LOW 25: CPT | Performed by: CLINICAL NURSE SPECIALIST

## 2021-05-27 PROCEDURE — 87081 CULTURE SCREEN ONLY: CPT

## 2021-05-27 PROCEDURE — 74011636637 HC RX REV CODE- 636/637: Performed by: OBSTETRICS & GYNECOLOGY

## 2021-05-27 PROCEDURE — 82962 GLUCOSE BLOOD TEST: CPT

## 2021-05-27 RX ORDER — CALCIUM CARBONATE 200(500)MG
200 TABLET,CHEWABLE ORAL
Status: DISCONTINUED | OUTPATIENT
Start: 2021-05-27 | End: 2021-05-31

## 2021-05-27 RX ORDER — DIPHENHYDRAMINE HCL 25 MG
50 CAPSULE ORAL
Status: DISCONTINUED | OUTPATIENT
Start: 2021-05-27 | End: 2021-06-02 | Stop reason: HOSPADM

## 2021-05-27 RX ADMIN — BUTALBITAL, ACETAMINOPHEN, AND CAFFEINE 2 TABLET: 50; 325; 40 TABLET ORAL at 03:09

## 2021-05-27 RX ADMIN — INSULIN GLARGINE 30 UNITS: 100 INJECTION, SOLUTION SUBCUTANEOUS at 01:33

## 2021-05-27 RX ADMIN — INSULIN GLARGINE 15 UNITS: 100 INJECTION, SOLUTION SUBCUTANEOUS at 13:06

## 2021-05-27 RX ADMIN — INSULIN LISPRO 8 UNITS: 100 INJECTION, SOLUTION INTRAVENOUS; SUBCUTANEOUS at 08:47

## 2021-05-27 RX ADMIN — INSULIN LISPRO 8 UNITS: 100 INJECTION, SOLUTION INTRAVENOUS; SUBCUTANEOUS at 19:17

## 2021-05-27 RX ADMIN — LABETALOL HYDROCHLORIDE 200 MG: 200 TABLET, FILM COATED ORAL at 08:47

## 2021-05-27 RX ADMIN — BUTALBITAL, ACETAMINOPHEN, AND CAFFEINE 2 TABLET: 50; 325; 40 TABLET ORAL at 21:11

## 2021-05-27 RX ADMIN — LABETALOL HYDROCHLORIDE 200 MG: 200 TABLET, FILM COATED ORAL at 18:48

## 2021-05-27 NOTE — PROGRESS NOTES
2021  12:37 PM    CM met with DU to complete initial assessment and begin discharge planning. MOB verified and confirmed demographics. DU lives with spouse/FOB-Anupam Guerrero ([phone]) along with her children ages: 16, 16,8,9, and 2yrs old, at the address on file. DU is not employed and plans to be home with infant. MOB reports she has a great support system with her family and parent.s  FOB is employed and will be taking adequate time off. DU plans to breast and bottle feed baby and is interested in obtaining a breast pump through insurance. MOB plans to follow with  Dr. Frankie Saini for pediatric care. DU has car seat, bassinet/crib, clothing, bottles and all necessary supplies for baby. DU has [employer], and will be adding baby to this policy. CM discussed process to add baby to insurance, MOB verbalized understanding. DU denied needing [facility] services. CM assisted MOB in ordering breast pump through [address] [place] [address] . Confirmation email sent to DU's email address. DU is , admitted for preeclampsia 36w. Plan for MOB to stay until delivery. CM following for needs. Care Management Interventions  PCP Verified by CM: Yes Irven Mow)  Mode of Transport at Discharge:  Other (see comment)  Transition of Care Consult (CM Consult): Discharge Planning  Current Support Network: Own Home, Family Lives Nearby, Lives with Spouse  Confirm Follow Up Transport: Family  Discharge Location  Discharge Placement: Home with family assistance  Claudine Cabrera

## 2021-05-27 NOTE — PROGRESS NOTES
0638: Bedside and Verbal shift change report given to LIONEL Jain RN (oncoming nurse) by Brennen Edmonds RN (offgoing nurse). Report included the following information SBAR, Kardex, Intake/Output, MAR, Recent Results, Med Rec Status, Alarm Parameters  and Quality Measures. 1905: Bedside, Verbal and Written shift change report given to Andrei Sharma (oncoming nurse) by Noreen Aguilar RN (offgoing nurse). Report included the following information SBAR, Kardex, Procedure Summary, Intake/Output, MAR, Recent Results, Med Rec Status and Alarm Parameters .

## 2021-05-27 NOTE — DIABETES MGMT
[facility]  CLINICAL NURSE SPECIALIST CONSULT  [facility]    FOLLOW-UP NOTE    Presentation   Rosaline Grady is a 43 y.o. female admitted 5/25/21 with elevated blood pressure during pregnancy.      HX:   Past Medical History (click to expand or collapse)        Past Medical History:   Diagnosis Date    Antepartum mild preeclampsia 5/24/2021    Diabetes ([address]              Current clinical course has been uncomplicated.      Diabetes: Patient has known gestational diabetes. Patient reports no diabetes outside of pregnancy, reports GDM with pregnancies 4-6 (2010; 2019; 2021). Home regimen during this pregnancy is Lantus 15 units at noon and 30 units at midnight, Novolog 8 units with breakfast and dinner. Family history positive for diabetes - mother, father, older brother, all grandparents. Patient sees endocrinologist Dr. Christine Castillo.   Admission  and A1c 5.7% (3/4/21) indicate poor diabetes control. Subjective   Patient laying in bed, alert and oriented. Patient reports headache, otherwise without complaints. Objective   Physical exam  General Alert, oriented and in no acute distress. Conversant and cooperative. Vital Signs   Visit Vitals  BP (!) 148/70   Pulse 68   Temp 98 °F (36.7 °C)   Resp 14   Ht 5' 4\" (1.626 m)   Wt 93.9 kg (207 lb)   SpO2 97%   BMI 35.53 kg/m²     Skin  Warm and dry  Heart   Regular rate and rhythm.  No murmurs, rubs or gallops  Lungs  Clear to auscultation without rales or rhonchi  Extremities No foot wounds    Laboratory  Lab Results   Component Value Date/Time    Hemoglobin A1c (POC) 5.7 03/04/2021 11:07 AM     No results found for: LDL, LDLC, DLDLP  Lab Results   Component Value Date/Time    Creatinine 0.28 (L) 05/24/2021 11:30 AM     Lab Results   Component Value Date/Time    Sodium 139 05/24/2021 11:30 AM    Potassium 3.3 (L) 05/24/2021 11:30 AM    Chloride 110 (H) 05/24/2021 11:30 AM    CO2 20 (L) 05/24/2021 11:30 AM    Anion gap 9 05/24/2021 11:30 AM Glucose 133 (H) 05/24/2021 11:30 AM    BUN 6 05/24/2021 11:30 AM    Creatinine 0.28 (L) 05/24/2021 11:30 AM    BUN/Creatinine ratio 21 (H) 05/24/2021 11:30 AM    GFR est AA >60 05/24/2021 11:30 AM    GFR est non-AA >60 05/24/2021 11:30 AM    Calcium 8.0 (L) 05/24/2021 11:30 AM    Bilirubin, total 0.6 05/24/2021 11:30 AM    Alk. phosphatase 121 (H) 05/24/2021 11:30 AM    Protein, total 6.2 (L) 05/24/2021 11:30 AM    Albumin 2.6 (L) 05/24/2021 11:30 AM    Globulin 3.6 05/24/2021 11:30 AM    A-G Ratio 0.7 (L) 05/24/2021 11:30 AM    ALT (SGPT) 9 (L) 05/24/2021 11:30 AM     Lab Results   Component Value Date/Time    ALT (SGPT) 9 (L) 05/24/2021 11:30 AM           Factors affecting BG pattern  Factor Dose Comments   Nutrition:  Carb-controlled meals    60 grams/meal    Gestational diabetic diet ordered   Drugs:  Vasopressor load  Steroids  HIV   Epogen  Blood transfusion(s)  Other: n/a    n/a  n/a  n/a  n/a  n/a             A1cs inaccurate  A1cs inaccurate   Pain 6/10  Fiorocet PRN   Infection n/a     Other: n/a n/a         Blood glucose pattern        Evaluation   This 43year old female, with gestational diabetes, did achieve diabetes control prior to admission (PTA), as evidenced by admission BG of 199 and A1c of 5.7%. Based on assessment of diabetes self-care practices, interventions that warrant action while hospitalized include:              [x]? Healthy Eating                           [x]? Being Active                              [x]? Reducing Risks        The patient would also benefit from diabetes self-management education and support [facility]) after discharge.     During this hospitalization, the patient has not achieved inpatient blood glucose target during pregnancy of FBG < 90 mg/dl and 2 hours post prandial of < 130 mg/dL. . BG's have ranged  over the past 24 hours. Factors that have played a role include:     [x]?         Compromised insulin absorption or delivery        Basal insulin is in use. Lantus 15 units at noon and 30 units at midnight currently ordered.       Bolus insulin is in use. Humalog 8 units with breakfast and dinner currently ordered. Patient is eating meals.     Corrective insulin isn't in use.       To optimize BG control and support a positive health outcome, would utilize the Subcutaneous Insulin Order set (9855).    Impression: It is suspected that patient will require bolus insulin with each meal (currently only gets with breakfast and dinner) as well as adding corrective insulin to cover any BG's that remain >130. Assessment and Plan   Nursing Diagnosis Risk for unstable blood glucose pattern   Nursing Intervention Domain 5255 Decision-making Support   Nursing Interventions Examined current inpatient diabetes control   Explored factors facilitating and impeding inpatient management       Recommendations   Recommend:     [x]? Use of Subcutaneous Insulin Order set (4248)  Insulin Use Recommendation   Basal                                      (Based on weight, BMI & GFR) Addresses basic metabolic needs Continue Lantus 15 units at noon and 30 units at midnight. Nutritional                                      (Based on CHO load) Addresses nutrition interventions Change bolus to Humalog 8 units with all meals. Corrective                                       (Offset gaps in dosing) Useful in adjusting insulin dosing Add Humalog correction 2 hours after meals:     CORRECTIONAL SCALE only For Blood Sugar (mg/dl) of :             140-199=2 units            200-249=3 units  250-299=5 units  300-349=7 units  350 or greater = Call MD  Give in addition to basal medications. Do Not Hold for NPO    BEDTIME CORRECTIONAL sliding scale when scheduled:  200-249=2 units  250-299=3 units   300-349=4 units  350 or greater = Call MD  Give in addition to basal medications. Do Not Hold for NPO         [x]? Referral to     [x]?         Diabetes Self-Management Training through [facility] (Phone [phone] to schedule appointment)      Time Spent/ Billing Codes     Total time spent with patient: 15 Minutes   I personally reviewed chart, notes, data and current medications in the medical record. I have personally examined and treated the patient at bedside during this period (circumstances permitting).      [x] [id number] IP subsequent hospital care - 15 minutes    Alexis King DNP, APRN-BC, CCNS  Gerhard Perez, CNS  Diabetes Clinical Nurse Specialist  [facility]  Access via [address] [place] [address]

## 2021-05-27 NOTE — PROGRESS NOTES
AntePartum High Risk Pregnancy Note    Late entry from rounding 110pm 2021      Rosaline Missed  36w0d      Patient admitted for preeclampsia   Estimated Date of Delivery: 21 states she does not  have  blurred vision, sob, cp or ruq pain. Good FM  No vb/vd/lof. Mild HA, requests medication after lunch    Vitals:    Patient Vitals for the past 24 hrs:   BP Temp Pulse Resp   21 0845 (!) 148/70 98 °F (36.7 °C) 68 14   21 0133 138/82  71    21 2108 (!) 153/73 98.2 °F (36.8 °C) 83 16   21 2106 (!) 167/79  83      Temp (24hrs), Av.1 °F (36.7 °C), Min:98 °F (36.7 °C), Max:98.2 °F (36.8 °C)    I&O:   No intake/output data recorded. No intake/output data recorded. Exam:  Patient without distress. Abdomen soft, non-tender               Fundus soft and non tender    Ext no c/t/e            Labs:   Recent Results (from the past 24 hour(s))   GLUCOSE, POC    Collection Time: 21  9:08 PM   Result Value Ref Range    Glucose (POC) 207 (H) 65 - 117 mg/dL    Performed by Aiden 137, POC    Collection Time: 21  8:47 AM   Result Value Ref Range    Glucose (POC) 80 65 - 117 mg/dL    Performed by [employer]    GLUCOSE, POC    Collection Time: 21 11:47 AM   Result Value Ref Range    Glucose (POC) 131 (H) 65 - 117 mg/dL    Performed by [employer]        Assessment and Plan:     43 y.o.   at 36w0d      1. Pre-elcampsia-BP's improved but 2 severe range this morning. Will increase labetalol to 200 bid and continue inpatient observation. IOL scheduled  at 37 weeks     2. GDM (likely Type2)- appreciate diabetes educator consultation, will also f/u with endocrine recs regarding adjusting insulin dose due to elevated postprandial BS. Labile BP.     3. GBS unknown, order placed      On this date, 2021,  I have spent 20 minutes reviewing previous notes, examining the patient, reviewing test results and face to face time with the patient discussing the diagnosis, plan of care and importance of compliance with the treatment plan and perfroming an exam.  We reviewed the planned hospital course as well as documented on the day of the hospitalization. Past Medical History:   Diagnosis Date    Antepartum mild preeclampsia 5/24/2021    Diabetes [facility])              NST Inpatient Procedure Note    Rosaline Missed presents for fetal non-stress test.    Indication is preeclampsia, GDM vs type II. She is 36w0d. She has been monitored for more than 30 minutes. The FHR was reactive. NST Interpretation:   From 5/26 2100  FHR baseline 130 bpm,   Variability:  moderate  Accelerations:  present  Decelerations Absent. Uterine contractions:  absent    Assessment  NST is reactive. NST is reassuring. Patient does need admission/observation for further monitoring. Chris Culver was informed of the NST results and her questions were answered. Plan:    [x] Continue admission on Labor and Delivery    [] Continue observation on Labor and Delivery   [] Keep routine OB follow up upon discharge   [] Reviewed fetal movement kick counts, notify MD if decreased   [] Continue continuous fetal monitoring.    []      Prenatal Labs:  Lab Results   Component Value Date/Time    Rubella, External Immune 11/30/2020 12:00 AM    HBsAg, External Negative 11/30/2020 12:00 AM    HIV, External Non-reactive 11/30/2020 12:00 AM    RPR, External Non-reactive 11/30/2020 12:00 AM

## 2021-05-27 NOTE — PROGRESS NOTES
1905: Bedside and Verbal shift change report given to YARY Terrell RN (oncoming nurse) by KWESI Jain RN (offgoing nurse). Report included the following information SBAR, Kardex, Intake/Output, MAR, Recent Results and Med Rec Status. 1915: RN at bedside. Pt about to eat dinner. Mealtime insulin administered at this time. No needs expressed. 2115: Pt placed on monitor for shift NST.     2150: Pt taken off monitor for reactive NST. Pt reports feeling mild occasional ctx. Pt instructed to alert RN if ctx increase in frequency or intensity. Pt verbalized understanding. 0012: RN rounding on pt. No needs expressed. 0700: Bedside and Verbal shift change report given to UMAIR Whelan RN (oncoming nurse) by Seven Pickett. Nidhi RN (offgoing nurse). Report included the following information SBAR, Kardex, Intake/Output, MAR, Recent Results and Med Rec Status.

## 2021-05-27 NOTE — PROGRESS NOTES
1900- Assumed care of patient. Pt resting in bed without complaints. 2100- . /79. Pt c/o headache 6/10. Dr Reuben Connor notified and orders for Fioricet received. On monitor for NST.    2200- Headache is better. No needs at this time. 0145- Pt resting in bed watching TV. No needs. VSS.    0600- Pt sleeping.

## 2021-05-28 LAB
GLUCOSE BLD STRIP.AUTO-MCNC: 121 MG/DL (ref 65–117)
GLUCOSE BLD STRIP.AUTO-MCNC: 147 MG/DL (ref 65–117)
GLUCOSE BLD STRIP.AUTO-MCNC: 150 MG/DL (ref 65–117)
GLUCOSE BLD STRIP.AUTO-MCNC: 160 MG/DL (ref 65–117)
GLUCOSE BLD STRIP.AUTO-MCNC: 74 MG/DL (ref 65–117)
GLUCOSE BLD STRIP.AUTO-MCNC: 77 MG/DL (ref 65–117)
SERVICE CMNT-IMP: ABNORMAL
SERVICE CMNT-IMP: NORMAL
SERVICE CMNT-IMP: NORMAL

## 2021-05-28 PROCEDURE — 99231 SBSQ HOSP IP/OBS SF/LOW 25: CPT | Performed by: OBSTETRICS & GYNECOLOGY

## 2021-05-28 PROCEDURE — 74011250637 HC RX REV CODE- 250/637: Performed by: OBSTETRICS & GYNECOLOGY

## 2021-05-28 PROCEDURE — 74011636637 HC RX REV CODE- 636/637: Performed by: OBSTETRICS & GYNECOLOGY

## 2021-05-28 PROCEDURE — 82962 GLUCOSE BLOOD TEST: CPT

## 2021-05-28 PROCEDURE — 65270000029 HC RM PRIVATE

## 2021-05-28 RX ORDER — DEXTROSE 50 % IN WATER (D50W) INTRAVENOUS SYRINGE
12.5-25 AS NEEDED
Status: DISCONTINUED | OUTPATIENT
Start: 2021-05-28 | End: 2021-06-02 | Stop reason: HOSPADM

## 2021-05-28 RX ORDER — INSULIN LISPRO 100 [IU]/ML
INJECTION, SOLUTION INTRAVENOUS; SUBCUTANEOUS AS NEEDED
Status: DISCONTINUED | OUTPATIENT
Start: 2021-05-28 | End: 2021-06-02 | Stop reason: HOSPADM

## 2021-05-28 RX ORDER — INSULIN LISPRO 100 [IU]/ML
8 INJECTION, SOLUTION INTRAVENOUS; SUBCUTANEOUS
Status: DISCONTINUED | OUTPATIENT
Start: 2021-05-28 | End: 2021-05-30

## 2021-05-28 RX ADMIN — BUTALBITAL, ACETAMINOPHEN, AND CAFFEINE 2 TABLET: 50; 325; 40 TABLET ORAL at 19:45

## 2021-05-28 RX ADMIN — INSULIN LISPRO 8 UNITS: 100 INJECTION, SOLUTION INTRAVENOUS; SUBCUTANEOUS at 08:47

## 2021-05-28 RX ADMIN — INSULIN LISPRO 2 UNITS: 100 INJECTION, SOLUTION INTRAVENOUS; SUBCUTANEOUS at 16:27

## 2021-05-28 RX ADMIN — LABETALOL HYDROCHLORIDE 200 MG: 200 TABLET, FILM COATED ORAL at 08:48

## 2021-05-28 RX ADMIN — BUTALBITAL, ACETAMINOPHEN, AND CAFFEINE 2 TABLET: 50; 325; 40 TABLET ORAL at 09:20

## 2021-05-28 RX ADMIN — INSULIN LISPRO 8 UNITS: 100 INJECTION, SOLUTION INTRAVENOUS; SUBCUTANEOUS at 19:45

## 2021-05-28 RX ADMIN — INSULIN GLARGINE 15 UNITS: 100 INJECTION, SOLUTION SUBCUTANEOUS at 12:41

## 2021-05-28 RX ADMIN — LABETALOL HYDROCHLORIDE 200 MG: 200 TABLET, FILM COATED ORAL at 19:00

## 2021-05-28 RX ADMIN — INSULIN GLARGINE 30 UNITS: 100 INJECTION, SOLUTION SUBCUTANEOUS at 00:12

## 2021-05-28 NOTE — PROGRESS NOTES
Dr Ayesha Gonzalez updated on . Orders received for Endocrinology cosult. 1230 This RN spoke to Karmen Dickson RN from Diabetes Management regarding elevated 2hr pp's. Her recommendation is to add Humalog 8 units with all meals. This protocol is written in her note. At this time she is unable to adjust orders. Her attending provider must place new insulin order. 1241 Lantus 15 units given. 1253 Perfect serve message sent to Dr Jesús Andrews regarding 2hr pp.    1196 This RN called endocrinology at [phone] to inquire about pt's consult that was ordered on 5/26. The / made two attempts to speak to RN from endocrinology with no answer. Will send message to appropriate person per . 1410 Dr Jesús Andrews updated on 2 hr pp. Aware pt is being followed by Karmen Dickson RN from Diabetes Management and she wrote recommendation for insulin adjustment due to elevated 2hr pp. Orders received per Dr Jesús Andrews to add Lispro 8units before lunch and sliding scale as recommended per Addi Price RN     1418 Insulin adjustment orders verified by Carina Sheriff RN    1500 Pt informed about the added insulin and sliding scale if necessary. Pt verbalized understanding. 1627 2 hr . Pt given Lispro 2 units sub q    1745 Pt resting without complaints. Pt ate a late lunch and will eat dinner later. 1815 Pt resting with eyes closed. 1840 Pt resting with eyes closed.     1914 Shift report given to FELIPE Edmondson RN

## 2021-05-28 NOTE — PROGRESS NOTES
Ante Partum Progress Note    Rosaline Missed  36w1d        Patient states she has no new complaints baby moving    Vitals:  Visit Vitals  BP (!) 146/65   Pulse 79   Temp 98.5 °F (36.9 °C)   Resp 18   Ht 5' 4\" (1.626 m)   Wt 207 lb (93.9 kg)   LMP 2020 (Exact Date)   SpO2 97%   BMI 35.53 kg/m²     Temp (24hrs), Av.5 °F (36.9 °C), Min:98.4 °F (36.9 °C), Max:98.6 °F (37 °C)      Last 24hr Input/Output:  No intake or output data in the 24 hours ending 21 1626     Non stress test:  Reactive    Uterine Activity: Irregular     Exam:  Patient without distress.      Abdomen, fundus soft non-tender     Extremities, no redness or tenderness               Additional Exam: Deferred    Labs:     Lab Results   Component Value Date/Time    WBC 5.5 2021 11:30 AM    WBC 6.2 2021 12:00 AM    WBC 5.9 2021 01:19 PM    WBC 6.9 2020 01:33 AM    WBC 7.5 2020 01:06 AM    WBC 5.0 2020 12:00 AM    WBC 6.4 10/10/2018 10:31 PM    HGB 10.9 (L) 2021 11:30 AM    HGB 11.5 2021 12:00 AM    HGB 11.4 (L) 2021 01:19 PM    HGB 12.6 2020 01:33 AM    HGB 12.5 2020 01:06 AM    HGB 12.5 2020 12:00 AM    HGB 11.7 10/10/2018 10:31 PM    HCT 33.0 (L) 2021 11:30 AM    HCT 34.6 2021 12:00 AM    HCT 35.3 2021 01:19 PM    HCT 37.4 2020 01:33 AM    HCT 37.9 2020 01:06 AM    HCT 37.5 2020 12:00 AM    HCT 34.8 (L) 10/10/2018 10:31 PM    PLATELET 335  11:30 AM    PLATELET 389  12:00 AM    PLATELET 226  01:19 PM    PLATELET 055  01:33 AM    PLATELET 831  01:06 AM    PLATELET 875  12:00 AM    PLATELET 396  10:31 PM       Recent Results (from the past 24 hour(s))   GLUCOSE, POC    Collection Time: 21  4:32 PM   Result Value Ref Range    Glucose (POC) 116 65 - 117 mg/dL    Performed by Cristobal Swenson    GLUCOSE, POC    Collection Time: 21  9:07 PM   Result Value Ref Range Glucose (POC) 180 (H) 65 - 117 mg/dL    Performed by Kristie Pollard, POC    Collection Time: 05/28/21  5:35 AM   Result Value Ref Range    Glucose (POC) 77 65 - 117 mg/dL    Performed by Jerica Diana    GLUCOSE, POC    Collection Time: 05/28/21  7:48 AM   Result Value Ref Range    Glucose (POC) 74 65 - 117 mg/dL    Performed by Leonela Rubio, POC    Collection Time: 05/28/21  8:27 AM   Result Value Ref Range    Glucose (POC) 121 (H) 65 - 117 mg/dL    Performed by Leonela Rubio, POC    Collection Time: 05/28/21 10:53 AM   Result Value Ref Range    Glucose (POC) 160 (H) 65 - 117 mg/dL    Performed by Leonela Rubio, POC    Collection Time: 05/28/21  4:14 PM   Result Value Ref Range    Glucose (POC) 150 (H) 65 - 117 mg/dL    Performed by Yumiko Peterson        Assessment: 36w1d   Preeclampsia  GDM - likely type 2    Plan: BP better controlled on current labetalol dose  Adjusted insulin today - appreciate help from diabetes education

## 2021-05-28 NOTE — PROGRESS NOTES
0700: Bedside and Verbal shift change report given to UMAIR Nogueira RN (oncoming nurse) by Margoth Terrell RN (offgoing nurse). Report included the following information SBAR, Kardex, Procedure Summary, Intake/Output, MAR, Recent Results, Med Rec Status and Quality Measures. : Pt asked this RN to obtain BG d/t feeling lightheaded. B, pt given juice. 4225: This RN reassessing if pt feeling better- pt states relief. 5939: This RN rechecking BG to make sure within limits to give scheduled Humalog 8 units. B.     0847: Pt given scheduled labtealol 200mg, and Humalog 8units at this time. Pt given new linens and gown, currently in BR changing. 6463: Pt finished with bfast. Pt electing to be placed on monitor for NST at this time. Pt denies any cramping or contractions. t denies HA/vision changes/SOB/RUQ pain. Pt endorses good FM.     0916: NST verified reactive with Lisette Izaguirre RN. EFM/TOCO removed at this time. 0920: Fioricet given for 6/10 HA. Pt states that this medication helped with HA prior. 1053: This RN rounding on pt. Pt stated relief from HA, resting at this time. BG: 160.     1055: Bedside and Verbal shift change report given to ELLIS Andrade RN (oncoming nurse) by Oj Caldwell RN (offgoing nurse). Report included the following information SBAR, Kardex, Procedure Summary, Intake/Output, MAR, Recent Results, Med Rec Status and Quality Measures.

## 2021-05-29 LAB
ALBUMIN SERPL-MCNC: 2.7 G/DL (ref 3.5–5)
ALBUMIN/GLOB SERPL: 0.8 {RATIO} (ref 1.1–2.2)
ALP SERPL-CCNC: 130 U/L (ref 45–117)
ALT SERPL-CCNC: 10 U/L (ref 12–78)
ANION GAP SERPL CALC-SCNC: 7 MMOL/L (ref 5–15)
AST SERPL-CCNC: 8 U/L (ref 15–37)
BASOPHILS # BLD: 0 K/UL (ref 0–0.1)
BASOPHILS NFR BLD: 0 % (ref 0–1)
BILIRUB SERPL-MCNC: 0.5 MG/DL (ref 0.2–1)
BUN SERPL-MCNC: 9 MG/DL (ref 6–20)
BUN/CREAT SERPL: 36 (ref 12–20)
CALCIUM SERPL-MCNC: 8.4 MG/DL (ref 8.5–10.1)
CHLORIDE SERPL-SCNC: 111 MMOL/L (ref 97–108)
CO2 SERPL-SCNC: 21 MMOL/L (ref 21–32)
CREAT SERPL-MCNC: 0.25 MG/DL (ref 0.55–1.02)
DIFFERENTIAL METHOD BLD: ABNORMAL
EOSINOPHIL # BLD: 0.1 K/UL (ref 0–0.4)
EOSINOPHIL NFR BLD: 1 % (ref 0–7)
ERYTHROCYTE [DISTWIDTH] IN BLOOD BY AUTOMATED COUNT: 13.5 % (ref 11.5–14.5)
GLOBULIN SER CALC-MCNC: 3.5 G/DL (ref 2–4)
GLUCOSE BLD STRIP.AUTO-MCNC: 101 MG/DL (ref 65–117)
GLUCOSE BLD STRIP.AUTO-MCNC: 102 MG/DL (ref 65–117)
GLUCOSE BLD STRIP.AUTO-MCNC: 103 MG/DL (ref 65–117)
GLUCOSE BLD STRIP.AUTO-MCNC: 161 MG/DL (ref 65–117)
GLUCOSE BLD STRIP.AUTO-MCNC: 176 MG/DL (ref 65–117)
GLUCOSE SERPL-MCNC: 83 MG/DL (ref 65–100)
HCT VFR BLD AUTO: 33.1 % (ref 35–47)
HGB BLD-MCNC: 10.8 G/DL (ref 11.5–16)
IMM GRANULOCYTES # BLD AUTO: 0 K/UL (ref 0–0.04)
IMM GRANULOCYTES NFR BLD AUTO: 1 % (ref 0–0.5)
LYMPHOCYTES # BLD: 1.2 K/UL (ref 0.8–3.5)
LYMPHOCYTES NFR BLD: 20 % (ref 12–49)
MCH RBC QN AUTO: 26.2 PG (ref 26–34)
MCHC RBC AUTO-ENTMCNC: 32.6 G/DL (ref 30–36.5)
MCV RBC AUTO: 80.3 FL (ref 80–99)
MONOCYTES # BLD: 0.4 K/UL (ref 0–1)
MONOCYTES NFR BLD: 6 % (ref 5–13)
NEUTS SEG # BLD: 4.2 K/UL (ref 1.8–8)
NEUTS SEG NFR BLD: 72 % (ref 32–75)
NRBC # BLD: 0 K/UL (ref 0–0.01)
NRBC BLD-RTO: 0 PER 100 WBC
PLATELET # BLD AUTO: 227 K/UL (ref 150–400)
PMV BLD AUTO: 11.4 FL (ref 8.9–12.9)
POTASSIUM SERPL-SCNC: 3.8 MMOL/L (ref 3.5–5.1)
PROT SERPL-MCNC: 6.2 G/DL (ref 6.4–8.2)
RBC # BLD AUTO: 4.12 M/UL (ref 3.8–5.2)
SERVICE CMNT-IMP: ABNORMAL
SERVICE CMNT-IMP: ABNORMAL
SERVICE CMNT-IMP: NORMAL
SODIUM SERPL-SCNC: 139 MMOL/L (ref 136–145)
WBC # BLD AUTO: 5.8 K/UL (ref 3.6–11)

## 2021-05-29 PROCEDURE — 99231 SBSQ HOSP IP/OBS SF/LOW 25: CPT | Performed by: OBSTETRICS & GYNECOLOGY

## 2021-05-29 PROCEDURE — 65270000029 HC RM PRIVATE

## 2021-05-29 PROCEDURE — 80053 COMPREHEN METABOLIC PANEL: CPT

## 2021-05-29 PROCEDURE — 85025 COMPLETE CBC W/AUTO DIFF WBC: CPT

## 2021-05-29 PROCEDURE — 74011250637 HC RX REV CODE- 250/637: Performed by: OBSTETRICS & GYNECOLOGY

## 2021-05-29 PROCEDURE — 74011636637 HC RX REV CODE- 636/637: Performed by: OBSTETRICS & GYNECOLOGY

## 2021-05-29 PROCEDURE — 82962 GLUCOSE BLOOD TEST: CPT

## 2021-05-29 PROCEDURE — 36415 COLL VENOUS BLD VENIPUNCTURE: CPT

## 2021-05-29 PROCEDURE — 59025 FETAL NON-STRESS TEST: CPT | Performed by: OBSTETRICS & GYNECOLOGY

## 2021-05-29 RX ORDER — NIFEDIPINE 10 MG/1
10 CAPSULE ORAL ONCE
Status: COMPLETED | OUTPATIENT
Start: 2021-05-29 | End: 2021-05-29

## 2021-05-29 RX ORDER — NIFEDIPINE 10 MG/1
20 CAPSULE ORAL ONCE
Status: DISPENSED | OUTPATIENT
Start: 2021-05-29 | End: 2021-05-30

## 2021-05-29 RX ADMIN — BUTALBITAL, ACETAMINOPHEN, AND CAFFEINE 2 TABLET: 50; 325; 40 TABLET ORAL at 21:23

## 2021-05-29 RX ADMIN — NIFEDIPINE 10 MG: 10 CAPSULE ORAL at 14:18

## 2021-05-29 RX ADMIN — INSULIN LISPRO 8 UNITS: 100 INJECTION, SOLUTION INTRAVENOUS; SUBCUTANEOUS at 20:23

## 2021-05-29 RX ADMIN — LABETALOL HYDROCHLORIDE 200 MG: 200 TABLET, FILM COATED ORAL at 08:29

## 2021-05-29 RX ADMIN — BUTALBITAL, ACETAMINOPHEN, AND CAFFEINE 2 TABLET: 50; 325; 40 TABLET ORAL at 13:46

## 2021-05-29 RX ADMIN — BUTALBITAL, ACETAMINOPHEN, AND CAFFEINE 2 TABLET: 50; 325; 40 TABLET ORAL at 03:14

## 2021-05-29 RX ADMIN — INSULIN LISPRO 2 UNITS: 100 INJECTION, SOLUTION INTRAVENOUS; SUBCUTANEOUS at 22:59

## 2021-05-29 RX ADMIN — INSULIN LISPRO 8 UNITS: 100 INJECTION, SOLUTION INTRAVENOUS; SUBCUTANEOUS at 09:09

## 2021-05-29 RX ADMIN — LABETALOL HYDROCHLORIDE 200 MG: 200 TABLET, FILM COATED ORAL at 17:55

## 2021-05-29 RX ADMIN — INSULIN GLARGINE 15 UNITS: 100 INJECTION, SOLUTION SUBCUTANEOUS at 12:40

## 2021-05-29 RX ADMIN — NIFEDIPINE 10 MG: 10 CAPSULE ORAL at 21:24

## 2021-05-29 RX ADMIN — INSULIN GLARGINE 30 UNITS: 100 INJECTION, SOLUTION SUBCUTANEOUS at 00:11

## 2021-05-29 RX ADMIN — INSULIN LISPRO 8 UNITS: 100 INJECTION, SOLUTION INTRAVENOUS; SUBCUTANEOUS at 15:13

## 2021-05-29 RX ADMIN — INSULIN LISPRO 2 UNITS: 100 INJECTION, SOLUTION INTRAVENOUS; SUBCUTANEOUS at 17:55

## 2021-05-29 RX ADMIN — ANTACID TABLETS 200 MG: 500 TABLET, CHEWABLE ORAL at 17:00

## 2021-05-29 NOTE — PROGRESS NOTES
Antepartum Obstetrics Progress Note    Name: Campbell Grady MRN: [id number]  SSN: [id number]    YOB: 1985  Age: 43 y.o. Sex: female      Subjective:      LOS: 5 days    Estimated Date of Delivery: 21   Gestational Age Today: 36w2d     Pre-e. On labetalol 200mg BID  A2GDM (likely pre-gestational). Followed by Dr. Shakira Garay. Diabetes Management assisting with inpt management (appreciate their input). Has been poorly controlled. Was on:  lantus 15u with lunch and 30u @ [facility]  lispro 8u with breakfast and dinner, lunch-time dose added yesterday. Sliding scale -- rec'd 2u yesterday. +FM. Some ctx, mild, worse yesterday. +HA    Objective:     Vitals:  Blood pressure (!) 157/74, pulse 63, temperature 97.9 °F (36.6 °C), resp. rate 16, height 5' 4\" (1.626 m), weight 207 lb (93.9 kg), last menstrual period 2020, SpO2 97 %. Temp (24hrs), Av.2 °F (36.8 °C), Min:97.9 °F (36.6 °C), Max:98.5 °F (27.4 °C)    Systolic (27OTP), RDX:610 , Min:138 , PD      Diastolic (09IQU), DWY:21, Min:65, Max:79       Intake and Output:         Physical Exam:  Patient without distress.   Heart: Regular rate and rhythm or S1S2 present  Lung: clear to auscultation throughout lung fields, no wheezes, no rales, no rhonchi and normal respiratory effort  Abdomen: soft, nontender  Lower Extremities:  - Edema 0-tr   - No cords or calf tenderness.   - Patellar Reflexes: 1+ bilaterally   - Clonus: absent       Membranes:  Intact    Fetal Heart Rate:  Reactive  Baseline: 120-125 per minute  Variability: moderate  Accelerations: yes  Decelerations: none  Uterine contractions: irregular, every 8-9 minutes  45 min tracing reviewed        Labs:   Recent Results (from the past 36 hour(s))   GLUCOSE, POC    Collection Time: 21  5:35 AM   Result Value Ref Range    Glucose (POC) 77 65 - 117 mg/dL    Performed by Erika PEREZ, POC    Collection Time: 21  7:48 AM   Result Value Ref Range    Glucose (POC) 74 65 - 117 mg/dL    Performed by Mushtaq Austin, POC    Collection Time: 05/28/21  8:27 AM   Result Value Ref Range    Glucose (POC) 121 (H) 65 - 117 mg/dL    Performed by Mushtaq Austin, POC    Collection Time: 05/28/21 10:53 AM   Result Value Ref Range    Glucose (POC) 160 (H) 65 - 117 mg/dL    Performed by Mushtaq Austin, POC    Collection Time: 05/28/21  4:14 PM   Result Value Ref Range    Glucose (POC) 150 (H) 65 - 117 mg/dL    Performed by Rachana Barton, POC    Collection Time: 05/28/21  9:37 PM   Result Value Ref Range    Glucose (POC) 147 (H) 65 - 117 mg/dL    Performed by Yolanda Barajas    GLUCOSE, POC    Collection Time: 05/29/21  6:00 AM   Result Value Ref Range    Glucose (POC) 101 65 - 117 mg/dL    Performed by Debbie San and Plan:      37yo [id number] @ 36+2    Pre-e. On labetalol 200mg BID. [id number]. A2DM. Likely pregestational.  Has been poorly controlled. Diabetes Management assisting with inpt management. Lispro 8u added to lunch-time yesterday. Currently on:  lantus 15u with lunch and 30u @ [facility]  lispro 8u TID with meals. Sliding scale -- rec'd 2u yesterday.     Continue inpt management  IOL planned for 37wks    Signed By: Renaldo Bill MD     May 29, 2021

## 2021-05-29 NOTE — PROGRESS NOTES
0755 Pt resting with eyes closed. 3607 Shift assessment complete. Plan of care discussed. Pt c/o mild headache on right side. Pt declines any pain management at this time. FM present. Pt instructed to call RN when breakfast comes so insulin can be administered. Call [phone] within reach. 7174 Breakfast tray delivered. Lispro 8 units given. NST to follow after breakfast.    1240 Pt resting without complaints. Lantus 15 units given. 0 Dr Aubrey Hernandez updated on bp of 155/71. No orders received at this time. 1355 Pt c/o headache 8/10 and nausea. Bp 181/83 and 160/78. . Dr Aubrey Hernandez aware. Will retake in 15min. 36 Dr Aubrey Hernandez updated on bp of 189/84. Orders taken for pt to receive Nifedipine 10mg po with a repeat bp in 20min. 1418 Pt given Nifedipine 10mg. Pt educated on purpose of medication. Pt verbalized understanding. 1445 /85. Pt to receive Nifedipine 20mg po    1453 /81. Nifedipine held. 1457 Pt verbalized her headache has resolved. 1513 Pt eating lunch. Lispro 8 units given. Sonal 3 reviewed with Dr Aubrey Hernandez. 1755 2hr pp 176. 2 units Lispro given    1800 Pt up to shower. Linens changed.     1900 Shift report given to [facility]

## 2021-05-29 NOTE — PROGRESS NOTES
1900: Bedside and Verbal shift change report given to ELIECER Rahman RN (oncoming nurse) by ELLIS Griffin RN (offgoing nurse). Report included the following information SBAR, Kardex, Procedure Summary, Intake/Output, MAR and Recent Results. 1950: Pt placed on EFM for NST. Pt has no complaints at this time. 2011: Pt taken off EFM at this time. Pt tolerated procedure. Pt has no complaints at this time. 2136: BG taken at this time. 0600: BG taken at this time. Pt tolerated procedure, denies any complaints at this time. 0715: Bedside and Verbal shift change report given to ELLIS Griffin RN (oncoming nurse) by ELEICER Rahman RN (offgoing nurse). Report included the following information SBAR, Kardex, Procedure Summary, Intake/Output, MAR and Recent Results.

## 2021-05-29 NOTE — PROGRESS NOTES
1910: Bedside, Verbal and Written shift change report given to SAMSON Armijo RN (oncoming nurse) by ELLIS Santana RN (offgoing nurse). Report included the following information SBAR, Kardex, Intake/Output, MAR, Accordion, Recent Results and Med Rec Status. [id number]: RN calling Vijay WEBB to update on pt status, recent BP readings and complaint of SHANNON. MD TORYEMI begin Nifedipine protocol, administer ordered Fioricet and place EFM/TOCO monitor. RN verbalizing understanding. 9214-2667: This RN continuing to frequently round on pt and assess VS and EFM tracing. Pt denies chest pain, HA, vision changes or epigastric pain. RN noting symmetrical chest rise and fall with clear bilateral lung sounds. Pt denies further needs. Call light in reach. 2357: RN updating Vijay WEBB on pt status, EFM/TOCO tracings and recent BP results. MD verbalizing understanding. 0013: RN updating Vijay WEBB on recent BP results. MD verbalizing understanding. No new orders at this time. 1594: This RN removing EFM/TOCO monitors at this time per reassuring FHR tracing and stable maternal VS. Patient resting in position of comfort in locked and lowered bed. Patient denies further needs at this time. Call bell within reach. 0425: This RN continuing to frequently round on pt and perform BP assessment hourly. Pt denies all pre-E symptoms. Patient resting in position of comfort in locked and lowered bed with FOB at pt bedside. VSS. Patient denies further needs at this time. Call bell within reach. 9151: Patient resting in position of comfort in locked and lowered bed. VSS. Patient denies further needs at this time. Call bell within reach. 0004: Patient resting in position of comfort in locked and lowered bed. VSS. Patient denies further needs at this time. Call bell within reach. 4177: Bedside, Verbal and Written shift change report given to David WANG (oncoming nurse) by SAMSON Armijo RN (offgoing nurse).  Report included the following information SBAR, Kardex, Intake/Output, MAR, Accordion, Recent Results and Med Rec Status.

## 2021-05-30 ENCOUNTER — ANESTHESIA (OUTPATIENT)
Dept: LABOR AND DELIVERY | Age: 36
DRG: 540 | End: 2021-05-30
Payer: MEDICAID

## 2021-05-30 ENCOUNTER — ANESTHESIA EVENT (OUTPATIENT)
Dept: LABOR AND DELIVERY | Age: 36
DRG: 540 | End: 2021-05-30
Payer: MEDICAID

## 2021-05-30 LAB
GLUCOSE BLD STRIP.AUTO-MCNC: 106 MG/DL (ref 65–117)
GLUCOSE BLD STRIP.AUTO-MCNC: 72 MG/DL (ref 65–117)
GLUCOSE BLD STRIP.AUTO-MCNC: 75 MG/DL (ref 65–117)
GLUCOSE BLD STRIP.AUTO-MCNC: 86 MG/DL (ref 65–117)
GLUCOSE BLD STRIP.AUTO-MCNC: 91 MG/DL (ref 65–117)
SERVICE CMNT-IMP: NORMAL

## 2021-05-30 PROCEDURE — 76815 OB US LIMITED FETUS(S): CPT | Performed by: OBSTETRICS & GYNECOLOGY

## 2021-05-30 PROCEDURE — 74011000258 HC RX REV CODE- 258: Performed by: OBSTETRICS & GYNECOLOGY

## 2021-05-30 PROCEDURE — 75410000002 HC LABOR FEE PER 1 HR: Performed by: OBSTETRICS & GYNECOLOGY

## 2021-05-30 PROCEDURE — 65270000029 HC RM PRIVATE

## 2021-05-30 PROCEDURE — 74011000250 HC RX REV CODE- 250: Performed by: ANESTHESIOLOGY

## 2021-05-30 PROCEDURE — 00HU33Z INSERTION OF INFUSION DEVICE INTO SPINAL CANAL, PERCUTANEOUS APPROACH: ICD-10-PCS | Performed by: PEDIATRICS

## 2021-05-30 PROCEDURE — 77030014125 HC TY EPDRL BBMI -B: Performed by: ANESTHESIOLOGY

## 2021-05-30 PROCEDURE — 74011250636 HC RX REV CODE- 250/636: Performed by: OBSTETRICS & GYNECOLOGY

## 2021-05-30 PROCEDURE — 74011636637 HC RX REV CODE- 636/637: Performed by: OBSTETRICS & GYNECOLOGY

## 2021-05-30 PROCEDURE — 76060000078 HC EPIDURAL ANESTHESIA: Performed by: OBSTETRICS & GYNECOLOGY

## 2021-05-30 PROCEDURE — 74011250637 HC RX REV CODE- 250/637: Performed by: OBSTETRICS & GYNECOLOGY

## 2021-05-30 PROCEDURE — 76010000391 HC C SECN FIRST 1 HR: Performed by: OBSTETRICS & GYNECOLOGY

## 2021-05-30 PROCEDURE — 77030005513 HC CATH URETH FOL11 MDII -B

## 2021-05-30 PROCEDURE — 82962 GLUCOSE BLOOD TEST: CPT

## 2021-05-30 PROCEDURE — 76010000392 HC C SECN EA ADDL 0.5 HR: Performed by: OBSTETRICS & GYNECOLOGY

## 2021-05-30 PROCEDURE — 74011250636 HC RX REV CODE- 250/636: Performed by: ANESTHESIOLOGY

## 2021-05-30 PROCEDURE — 2709999900 HC NON-CHARGEABLE SUPPLY

## 2021-05-30 PROCEDURE — 75410000003 HC RECOV DEL/VAG/CSECN EA 0.5 HR: Performed by: OBSTETRICS & GYNECOLOGY

## 2021-05-30 RX ORDER — DEXTROSE 50 % IN WATER (D50W) INTRAVENOUS SYRINGE
25-50 AS NEEDED
Status: CANCELLED | OUTPATIENT
Start: 2021-05-30

## 2021-05-30 RX ORDER — INSULIN LISPRO 100 [IU]/ML
INJECTION, SOLUTION INTRAVENOUS; SUBCUTANEOUS
Status: CANCELLED | OUTPATIENT
Start: 2021-05-30

## 2021-05-30 RX ORDER — LIDOCAINE HYDROCHLORIDE 10 MG/ML
INJECTION INFILTRATION; PERINEURAL AS NEEDED
Status: DISCONTINUED | OUTPATIENT
Start: 2021-05-30 | End: 2021-05-31 | Stop reason: HOSPADM

## 2021-05-30 RX ORDER — CEFAZOLIN SODIUM 1 G/3ML
INJECTION, POWDER, FOR SOLUTION INTRAMUSCULAR; INTRAVENOUS AS NEEDED
Status: DISCONTINUED | OUTPATIENT
Start: 2021-05-30 | End: 2021-05-31 | Stop reason: HOSPADM

## 2021-05-30 RX ORDER — NALBUPHINE HYDROCHLORIDE 10 MG/ML
10 INJECTION, SOLUTION INTRAMUSCULAR; INTRAVENOUS; SUBCUTANEOUS
Status: DISCONTINUED | OUTPATIENT
Start: 2021-05-30 | End: 2021-06-02 | Stop reason: HOSPADM

## 2021-05-30 RX ORDER — MAGNESIUM SULFATE HEPTAHYDRATE 40 MG/ML
2 INJECTION, SOLUTION INTRAVENOUS ONCE
Status: DISCONTINUED | OUTPATIENT
Start: 2021-05-30 | End: 2021-05-30 | Stop reason: DRUGHIGH

## 2021-05-30 RX ORDER — MAGNESIUM SULFATE HEPTAHYDRATE 40 MG/ML
4 INJECTION, SOLUTION INTRAVENOUS ONCE
Status: COMPLETED | OUTPATIENT
Start: 2021-05-30 | End: 2021-05-30

## 2021-05-30 RX ORDER — BUPIVACAINE HYDROCHLORIDE 2.5 MG/ML
INJECTION, SOLUTION EPIDURAL; INFILTRATION; INTRACAUDAL AS NEEDED
Status: DISCONTINUED | OUTPATIENT
Start: 2021-05-30 | End: 2021-05-31 | Stop reason: HOSPADM

## 2021-05-30 RX ORDER — WATER FOR INJECTION,STERILE
VIAL (ML) INJECTION
Status: DISPENSED
Start: 2021-05-30 | End: 2021-05-31

## 2021-05-30 RX ORDER — CEFAZOLIN SODIUM 1 G/3ML
INJECTION, POWDER, FOR SOLUTION INTRAMUSCULAR; INTRAVENOUS
Status: DISPENSED
Start: 2021-05-30 | End: 2021-05-31

## 2021-05-30 RX ORDER — MAGNESIUM SULFATE HEPTAHYDRATE 40 MG/ML
2 INJECTION, SOLUTION INTRAVENOUS CONTINUOUS
Status: DISCONTINUED | OUTPATIENT
Start: 2021-05-30 | End: 2021-06-01

## 2021-05-30 RX ORDER — EPHEDRINE SULFATE/0.9% NACL/PF 50 MG/5 ML
10 SYRINGE (ML) INTRAVENOUS
Status: DISCONTINUED | OUTPATIENT
Start: 2021-05-30 | End: 2021-05-31 | Stop reason: HOSPADM

## 2021-05-30 RX ORDER — FENTANYL/BUPIVACAINE/NS/PF 2-1250MCG
1-16 PREFILLED PUMP RESERVOIR EPIDURAL CONTINUOUS
Status: DISCONTINUED | OUTPATIENT
Start: 2021-05-30 | End: 2021-05-31 | Stop reason: HOSPADM

## 2021-05-30 RX ORDER — LIDOCAINE HYDROCHLORIDE AND EPINEPHRINE 15; 5 MG/ML; UG/ML
INJECTION, SOLUTION EPIDURAL AS NEEDED
Status: DISCONTINUED | OUTPATIENT
Start: 2021-05-30 | End: 2021-05-31 | Stop reason: HOSPADM

## 2021-05-30 RX ORDER — LIDOCAINE HYDROCHLORIDE AND EPINEPHRINE 20; 5 MG/ML; UG/ML
INJECTION, SOLUTION EPIDURAL; INFILTRATION; INTRACAUDAL; PERINEURAL AS NEEDED
Status: DISCONTINUED | OUTPATIENT
Start: 2021-05-30 | End: 2021-05-31 | Stop reason: HOSPADM

## 2021-05-30 RX ORDER — MORPHINE SULFATE 0.5 MG/ML
INJECTION, SOLUTION EPIDURAL; INTRATHECAL; INTRAVENOUS AS NEEDED
Status: DISCONTINUED | OUTPATIENT
Start: 2021-05-30 | End: 2021-05-31 | Stop reason: HOSPADM

## 2021-05-30 RX ORDER — MAGNESIUM SULFATE 100 %
4 CRYSTALS MISCELLANEOUS AS NEEDED
Status: CANCELLED | OUTPATIENT
Start: 2021-05-30

## 2021-05-30 RX ORDER — SODIUM CHLORIDE, SODIUM LACTATE, POTASSIUM CHLORIDE, CALCIUM CHLORIDE 600; 310; 30; 20 MG/100ML; MG/100ML; MG/100ML; MG/100ML
75 INJECTION, SOLUTION INTRAVENOUS CONTINUOUS
Status: DISCONTINUED | OUTPATIENT
Start: 2021-05-30 | End: 2021-06-01

## 2021-05-30 RX ORDER — SODIUM CHLORIDE, SODIUM LACTATE, POTASSIUM CHLORIDE, CALCIUM CHLORIDE 600; 310; 30; 20 MG/100ML; MG/100ML; MG/100ML; MG/100ML
125 INJECTION, SOLUTION INTRAVENOUS CONTINUOUS
Status: DISCONTINUED | OUTPATIENT
Start: 2021-05-30 | End: 2021-05-31 | Stop reason: HOSPADM

## 2021-05-30 RX ORDER — EPHEDRINE SULFATE/0.9% NACL/PF 50 MG/5 ML
SYRINGE (ML) INTRAVENOUS AS NEEDED
Status: DISCONTINUED | OUTPATIENT
Start: 2021-05-30 | End: 2021-05-30

## 2021-05-30 RX ORDER — OXYTOCIN 10 [USP'U]/ML
INJECTION, SOLUTION INTRAMUSCULAR; INTRAVENOUS AS NEEDED
Status: DISCONTINUED | OUTPATIENT
Start: 2021-05-30 | End: 2021-05-31 | Stop reason: HOSPADM

## 2021-05-30 RX ORDER — CALCIUM GLUCONATE 20 MG/ML
1 INJECTION, SOLUTION INTRAVENOUS
Status: DISPENSED | OUTPATIENT
Start: 2021-05-30 | End: 2021-05-31

## 2021-05-30 RX ADMIN — LIDOCAINE HYDROCHLORIDE,EPINEPHRINE BITARTRATE 5 ML: 20; .005 INJECTION, SOLUTION EPIDURAL; INFILTRATION; INTRACAUDAL; PERINEURAL at 23:32

## 2021-05-30 RX ADMIN — LIDOCAINE HYDROCHLORIDE,EPINEPHRINE BITARTRATE 10 ML: 20; .005 INJECTION, SOLUTION EPIDURAL; INFILTRATION; INTRACAUDAL; PERINEURAL at 23:16

## 2021-05-30 RX ADMIN — OXYTOCIN 30 UNITS: 10 INJECTION, SOLUTION INTRAMUSCULAR; INTRAVENOUS at 23:55

## 2021-05-30 RX ADMIN — SODIUM CHLORIDE, POTASSIUM CHLORIDE, SODIUM LACTATE AND CALCIUM CHLORIDE 75 ML/HR: 600; 310; 30; 20 INJECTION, SOLUTION INTRAVENOUS at 12:41

## 2021-05-30 RX ADMIN — SODIUM CHLORIDE, POTASSIUM CHLORIDE, SODIUM LACTATE AND CALCIUM CHLORIDE 75 ML/HR: 600; 310; 30; 20 INJECTION, SOLUTION INTRAVENOUS at 22:41

## 2021-05-30 RX ADMIN — LABETALOL HYDROCHLORIDE 200 MG: 200 TABLET, FILM COATED ORAL at 18:28

## 2021-05-30 RX ADMIN — CEFAZOLIN SODIUM 2 G: 1 POWDER, FOR SOLUTION INTRAMUSCULAR; INTRAVENOUS at 23:37

## 2021-05-30 RX ADMIN — MISOPROSTOL 50 MCG: 100 TABLET ORAL at 12:38

## 2021-05-30 RX ADMIN — MORPHINE SULFATE 3 MG: 0.5 INJECTION, SOLUTION EPIDURAL; INTRATHECAL; INTRAVENOUS at 23:57

## 2021-05-30 RX ADMIN — MAGNESIUM SULFATE HEPTAHYDRATE 4 G: 40 INJECTION, SOLUTION INTRAVENOUS at 12:31

## 2021-05-30 RX ADMIN — INSULIN GLARGINE 30 UNITS: 100 INJECTION, SOLUTION SUBCUTANEOUS at 00:21

## 2021-05-30 RX ADMIN — Medication 12 ML/HR: at 16:14

## 2021-05-30 RX ADMIN — LIDOCAINE HYDROCHLORIDE AND EPINEPHRINE 3 ML: 15; 5 INJECTION, SOLUTION EPIDURAL at 15:47

## 2021-05-30 RX ADMIN — INSULIN LISPRO 8 UNITS: 100 INJECTION, SOLUTION INTRAVENOUS; SUBCUTANEOUS at 08:44

## 2021-05-30 RX ADMIN — LABETALOL HYDROCHLORIDE 200 MG: 200 TABLET, FILM COATED ORAL at 08:43

## 2021-05-30 RX ADMIN — SODIUM CHLORIDE, POTASSIUM CHLORIDE, SODIUM LACTATE AND CALCIUM CHLORIDE 999 ML/HR: 600; 310; 30; 20 INJECTION, SOLUTION INTRAVENOUS at 15:39

## 2021-05-30 RX ADMIN — SODIUM CHLORIDE, POTASSIUM CHLORIDE, SODIUM LACTATE AND CALCIUM CHLORIDE 75 ML/HR: 600; 310; 30; 20 INJECTION, SOLUTION INTRAVENOUS at 14:49

## 2021-05-30 RX ADMIN — LIDOCAINE HYDROCHLORIDE,EPINEPHRINE BITARTRATE 5 ML: 20; .005 INJECTION, SOLUTION EPIDURAL; INFILTRATION; INTRACAUDAL; PERINEURAL at 23:23

## 2021-05-30 RX ADMIN — MAGNESIUM SULFATE HEPTAHYDRATE 2 G/HR: 40 INJECTION, SOLUTION INTRAVENOUS at 22:42

## 2021-05-30 RX ADMIN — Medication 12 ML/HR: at 21:42

## 2021-05-30 RX ADMIN — BUPIVACAINE HYDROCHLORIDE 10 ML: 2.5 INJECTION, SOLUTION EPIDURAL; INFILTRATION; INTRACAUDAL; PERINEURAL at 15:51

## 2021-05-30 RX ADMIN — SODIUM CHLORIDE 5 MILLION UNITS: 900 INJECTION INTRAVENOUS at 19:30

## 2021-05-30 RX ADMIN — NALBUPHINE HYDROCHLORIDE 10 MG: 10 INJECTION, SOLUTION INTRAMUSCULAR; INTRAVENOUS; SUBCUTANEOUS at 14:02

## 2021-05-30 RX ADMIN — MAGNESIUM SULFATE HEPTAHYDRATE 2 G/HR: 40 INJECTION, SOLUTION INTRAVENOUS at 12:56

## 2021-05-30 RX ADMIN — LIDOCAINE HYDROCHLORIDE 3 MG: 10 INJECTION, SOLUTION INFILTRATION; PERINEURAL at 15:44

## 2021-05-30 RX ADMIN — AZITHROMYCIN MONOHYDRATE 500 MG: 500 INJECTION, POWDER, LYOPHILIZED, FOR SOLUTION INTRAVENOUS at 23:20

## 2021-05-30 NOTE — PROGRESS NOTES
Cervical Catheter insertion procedure note    Rosaline Missed is a ,  43 y.o. female who presents today far placement of a cervical catheter in the cervix for ripening. Her cervix is unfavorable and she is scheduled for induction tomorrow. She has elected to have a cervical catheter placement today. The risks, benefits and assets of the procedure were discussed. Her questions were answered. PROCEDURE: The cervix was visualized with a speculum and  prepped with betadine. A Cook catheter was placed through the cervix without difficulty. The uterine bulb was inflated with 80 cc of saline. The cervical balloon was inflated to 60 cc of saline. Bleeding was minimal. The patient's level of discomfort was minimal.   POST PROCEDURE: The patient tolerated the procedure well. There were no complications.

## 2021-05-30 NOTE — PROGRESS NOTES
Pt seen and examined. SBAR received from outgoing physician. Pt comfortable with epidural.       Afebrile, normotensive  COOK catheter in situ, attempted removal, no movement of catheter  FHT: 120 reactive, Cat I, +accels, no decels, moderate variability  [place]: Q1-2 minutes after one dose of miso Buccally          A/ [id number] with Preeclampsia with severe features IOL due to labile BPs and HA. A2GDM,   GBS unknown    P/ PCN, SSI,  continuous monitoring.

## 2021-05-30 NOTE — ANESTHESIA PROCEDURE NOTES
Epidural Block    Start time: 5/30/2021 3:43 PM  End time: 5/30/2021 3:51 PM  Reason for block: labor epidural  Staffing  Performed: attending   Anesthesiologist: Piter Shannon MD  Preanesthetic Checklist  Completed: patient identified, IV checked, site marked, risks and benefits discussed, surgical consent, monitors and equipment checked, pre-op evaluation and timeout performed  Block Placement  Patient position: sitting  Prep: ChloraPrep  Sterility prep: cap, drape, gloves, hand and mask  Sedation level: no sedation  Patient monitoring: continuous pulse oximetry and heart rate  Approach: midline  Location: lumbar  Epidural  Loss of resistance technique: saline  Guidance: landmark technique  Needle  Needle type: Tuohy   Needle gauge: 17 G  Needle length: 9 cm  Catheter type: multi-orifice  Catheter size: 20 G  Catheter securement method: clear occlusive dressing and surgical tape  Test dose: negative  Assessment  Block outcome: pain improved  Number of attempts: 1  Procedure assessment: patient tolerated procedure well with no immediate complications

## 2021-05-30 NOTE — PROGRESS NOTES
1028: Bedside, Verbal and Written shift change report given to LIONEL Jain RN (oncoming nurse) by Enrique You RN (offgoing nurse). Report included the following information SBAR, Kardex, Intake/Output, MAR, Recent Results, Med Rec Status and Alarm Parameters . 1045: Pt transferred to room 206 for IOL d/t Pre-E    1137: Dr. Hernandez Rivera at bedside. Cook catheter inserted with 60/80. Pt tolerated well.      1209: Message sent to pharmacy to restock miso as it is not in the pyxis at this time. 1231: Mag started    1238: 50mcg Miso given buccally. 5481-5654: Dr. Jameel Herrmann at bedside for epidural placement. 1638: Miso held d/t >3 contractions in 10 min. 1823: Dr. Ge Del Valle at bedside. Ulus Nova catheter still in place. No new orders received. 1905: Bedside, Verbal and Written shift change report given to Todd Alejandro RN (oncoming nurse) by Carla Tellez RN (offgoing nurse). Report included the following information SBAR, Kardex, Procedure Summary, Intake/Output, MAR, Recent Results, Med Rec Status and Alarm Parameters .

## 2021-05-30 NOTE — PROGRESS NOTES
The patient was evaluated secondary to severe range blood pressures that have since improved with Nifedipine 10 mg. She denies headache, vision changes, nausea, vomiting, ruq pain, and epigastric pain.     Patient Vitals for the past 4 hrs:   Pulse BP SpO2   05/30/21 0013   100 %   05/30/21 0008   99 %   05/30/21 0004 71 (!) 140/76    05/30/21 0003   100 %   05/29/21 2358   99 %   05/29/21 2353   98 %   05/29/21 2349 78 (!) 165/69    05/29/21 2348   99 %   05/29/21 2343   99 %   05/29/21 2338   98 %   05/29/21 2333   99 %   05/29/21 2332 71 128/69    05/29/21 2328   98 %   05/29/21 2323   99 %   05/29/21 2318   99 %   05/29/21 2317 68 (!) 153/70    05/29/21 2313   99 %   05/29/21 2308   99 %   05/29/21 2303   100 %   05/29/21 2301 67 (!) 146/78    05/29/21 2250 63 (!) 149/77    05/29/21 2229 78 (!) 156/75    05/29/21 2227   100 %   05/29/21 2222   98 %   05/29/21 2218 69 (!) 143/73    05/29/21 2217   100 %   05/29/21 2159 71 (!) 140/71    05/29/21 2157   100 %   05/29/21 2152   99 %   05/29/21 2147   99 %   05/29/21 2145 77 (!) 145/78    05/29/21 2142   99 %   05/29/21 2137   98 %   05/29/21 2103 77 (!) 162/81    05/29/21 2048 73 (!) 179/86        FHR: 130 moderate variability, accelerations present, no decelerations, cat 1  [place]: no contractions  Abdomen: soft, gravid, non tender  Ext: no edema, dtr 2+    Recent Results (from the past 12 hour(s))   GLUCOSE, POC    Collection Time: 05/29/21  1:42 PM   Result Value Ref Range    Glucose (POC) 103 65 - 117 mg/dL    Performed by [address], [place]    Collection Time: 05/29/21  3:03 PM   Result Value Ref Range    Sodium 139 136 - 145 mmol/L    Potassium 3.8 3.5 - 5.1 mmol/L    Chloride 111 (H) 97 - 108 mmol/L    CO2 21 21 - 32 mmol/L    Anion gap 7 5 - 15 mmol/L    Glucose 83 65 - 100 mg/dL    BUN 9 6 - 20 MG/DL    Creatinine 0.25 (L) 0.55 - 1.02 MG/DL    BUN/Creatinine ratio 36 (H) 12 - 20      GFR est AA >60 >60 ml/min/1.73m2    GFR est non-AA >60 >60 ml/min/1.73m2    Calcium 8.4 (L) 8.5 - 10.1 MG/DL    Bilirubin, total 0.5 0.2 - 1.0 MG/DL    ALT (SGPT) 10 (L) 12 - 78 U/L    AST (SGOT) 8 (L) 15 - 37 U/L    Alk. phosphatase 130 (H) 45 - 117 U/L    Protein, total 6.2 (L) 6.4 - 8.2 g/dL    Albumin 2.7 (L) 3.5 - 5.0 g/dL    Globulin 3.5 2.0 - 4.0 g/dL    A-G Ratio 0.8 (L) 1.1 - 2.2     CBC WITH AUTOMATED DIFF    Collection Time: 21  3:03 PM   Result Value Ref Range    WBC 5.8 3.6 - 11.0 K/uL    RBC 4.12 3.80 - 5.20 M/uL    HGB 10.8 (L) 11.5 - 16.0 g/dL    HCT 33.1 (L) 35.0 - 47.0 %    MCV 80.3 80.0 - 99.0 FL    MCH 26.2 26.0 - 34.0 PG    MCHC 32.6 30.0 - 36.5 g/dL    RDW 13.5 11.5 - 14.5 %    PLATELET 736 535 - 274 K/uL    MPV 11.4 8.9 - 12.9 FL    NRBC 0.0 0  WBC    ABSOLUTE NRBC 0.00 0.00 - 0.01 K/uL    NEUTROPHILS 72 32 - 75 %    LYMPHOCYTES 20 12 - 49 %    MONOCYTES 6 5 - 13 %    EOSINOPHILS 1 0 - 7 %    BASOPHILS 0 0 - 1 %    IMMATURE GRANULOCYTES 1 (H) 0.0 - 0.5 %    ABS. NEUTROPHILS 4.2 1.8 - 8.0 K/UL    ABS. LYMPHOCYTES 1.2 0.8 - 3.5 K/UL    ABS. MONOCYTES 0.4 0.0 - 1.0 K/UL    ABS. EOSINOPHILS 0.1 0.0 - 0.4 K/UL    ABS. BASOPHILS 0.0 0.0 - 0.1 K/UL    ABS. IMM. GRANS. 0.0 0.00 - 0.04 K/UL    DF AUTOMATED     GLUCOSE, POC    Collection Time: 21  5:44 PM   Result Value Ref Range    Glucose (POC) 176 (H) 65 - 117 mg/dL    Performed by Norman Mac, POC    Collection Time: 21 10:49 PM   Result Value Ref Range    Glucose (POC) 161 (H) 65 - 117 mg/dL    Performed by Coby Ren Stephen Doll        Ass/Plan:  at 36 3/7 wks with preeclampsia and poorly controlled GDMA2, severe range blood pressures that have resolved with Nifedipine. She has received a total of 40 mg of short acting Nifedipine. Will continue to closely monitor. The plan is for IOL at 37 wks with earlier induction if evidence of worsening preeclampsia.

## 2021-05-30 NOTE — ANESTHESIA PREPROCEDURE EVALUATION
Relevant Problems   CARDIOVASCULAR   (+) Antepartum mild preeclampsia       Anesthetic History   No history of anesthetic complications            Review of Systems / Medical History  Patient summary reviewed and pertinent labs reviewed    Pulmonary  Within defined limits                 Neuro/Psych   Within defined limits           Cardiovascular    Hypertension                   GI/Hepatic/Renal  Within defined limits              Endo/Other    Diabetes         Other Findings   Comments: G5, active labor requesting epidural           Physical Exam    Airway  Mallampati: II           Cardiovascular               Dental         Pulmonary                 Abdominal         Other Findings            Anesthetic Plan    ASA: 2  Anesthesia type: epidural          Induction: Intravenous  Anesthetic plan and risks discussed with: Patient

## 2021-05-30 NOTE — PROGRESS NOTES
Antepartum Obstetrics Progress Note    Name: Joanna Grady MRN: [id number]  SSN: [id number]    YOB: 1985  Age: 43 y.o. Sex: female      Subjective:      LOS: 6 days    Estimated Date of Delivery: 21   Gestational Age Today: 36w3d     Pre-e. On labetalol 200mg BID. Tx'd yesterday for severe range pressures: @ 1400 nifedipine 10/20 for SBPs 160s-180s; @ 2100 nifedipine 10mg. +HA, says this is new over last few days, denies h/o HA. A2DM (likely pregestational). Lispro 8u TID with measl  lantus 15u with lunch, 30u @MN  Sliding scale -- rec'd total of 4u yesterday (2u x2)    Objective:     Vitals:  Blood pressure (!) 140/91, pulse 75, temperature 97.9 °F (36.6 °C), resp. rate 16, height 5' 4\" (1.626 m), weight 207 lb (93.9 kg), last menstrual period 2020, SpO2 99 %. Temp (24hrs), Av.2 °F (36.8 °C), Min:97.9 °F (36.6 °C), Max:98.6 °F (37 °C)    Systolic (87HGP), SLP:984 , Min:126 , MGO:774      Diastolic (33FGN), DFU:21, Min:60, Max:91       Intake and Output:         Physical Exam:  Patient without distress.   Heart: Regular rate and rhythm or S1S2 present  Lung: clear to auscultation throughout lung fields, no wheezes, no rales, no rhonchi and normal respiratory effort  Abdomen: soft, nontender  Fundus: soft and non tender  Cervical Exam: 1 cm dilated    0% effaced    -3 station    Presenting Part: cephalic  Cervical Position: posterior  Consistency: Medium  Lower Extremities:  - Edema tr   - No cords or calf tenderness.   - Patellar Reflexes: 1+ bilaterally   - Clonus: absent       Membranes:  Intact      Labs:   Recent Results (from the past 36 hour(s))   GLUCOSE, POC    Collection Time: 21  6:00 AM   Result Value Ref Range    Glucose (POC) 101 65 - 117 mg/dL    Performed by Linnea Braun    GLUCOSE, POC    Collection Time: 21 11:44 AM   Result Value Ref Range    Glucose (POC) 102 65 - 117 mg/dL    Performed by Indra Mckeon, POC    Collection Time: 05/29/21  1:42 PM   Result Value Ref Range    Glucose (POC) 103 65 - 117 mg/dL    Performed by [facility]    METABOLIC PANEL, COMPREHENSIVE    Collection Time: 05/29/21  3:03 PM   Result Value Ref Range    Sodium 139 136 - 145 mmol/L    Potassium 3.8 3.5 - 5.1 mmol/L    Chloride 111 (H) 97 - 108 mmol/L    CO2 21 21 - 32 mmol/L    Anion gap 7 5 - 15 mmol/L    Glucose 83 65 - 100 mg/dL    BUN 9 6 - 20 MG/DL    Creatinine 0.25 (L) 0.55 - 1.02 MG/DL    BUN/Creatinine ratio 36 (H) 12 - 20      GFR est AA >60 >60 ml/min/1.73m2    GFR est non-AA >60 >60 ml/min/1.73m2    Calcium 8.4 (L) 8.5 - 10.1 MG/DL    Bilirubin, total 0.5 0.2 - 1.0 MG/DL    ALT (SGPT) 10 (L) 12 - 78 U/L    AST (SGOT) 8 (L) 15 - 37 U/L    Alk. phosphatase 130 (H) 45 - 117 U/L    Protein, total 6.2 (L) 6.4 - 8.2 g/dL    Albumin 2.7 (L) 3.5 - 5.0 g/dL    Globulin 3.5 2.0 - 4.0 g/dL    A-G Ratio 0.8 (L) 1.1 - 2.2     CBC WITH AUTOMATED DIFF    Collection Time: 05/29/21  3:03 PM   Result Value Ref Range    WBC 5.8 3.6 - 11.0 K/uL    RBC 4.12 3.80 - 5.20 M/uL    HGB 10.8 (L) 11.5 - 16.0 g/dL    HCT 33.1 (L) 35.0 - 47.0 %    MCV 80.3 80.0 - 99.0 FL    MCH 26.2 26.0 - 34.0 PG    MCHC 32.6 30.0 - 36.5 g/dL    RDW 13.5 11.5 - 14.5 %    PLATELET 908 488 - 065 K/uL    MPV 11.4 8.9 - 12.9 FL    NRBC 0.0 0  WBC    ABSOLUTE NRBC 0.00 0.00 - 0.01 K/uL    NEUTROPHILS 72 32 - 75 %    LYMPHOCYTES 20 12 - 49 %    MONOCYTES 6 5 - 13 %    EOSINOPHILS 1 0 - 7 %    BASOPHILS 0 0 - 1 %    IMMATURE GRANULOCYTES 1 (H) 0.0 - 0.5 %    ABS. NEUTROPHILS 4.2 1.8 - 8.0 K/UL    ABS. LYMPHOCYTES 1.2 0.8 - 3.5 K/UL    ABS. MONOCYTES 0.4 0.0 - 1.0 K/UL    ABS. EOSINOPHILS 0.1 0.0 - 0.4 K/UL    ABS. BASOPHILS 0.0 0.0 - 0.1 K/UL    ABS. IMM.  GRANS. 0.0 0.00 - 0.04 K/UL    DF AUTOMATED     GLUCOSE, POC    Collection Time: 05/29/21  5:44 PM   Result Value Ref Range    Glucose (POC) 176 (H) 65 - 117 mg/dL    Performed by Sirena Adams, POC    Collection Time: 05/29/21 10:49 PM   Result Value Ref Range    Glucose (POC) 161 (H) 65 - 117 mg/dL    Performed by Gerry Tang) [address] [place] [address], [facility]    Collection Time: 05/30/21  8:42 AM   Result Value Ref Range    Glucose (POC) 75 65 - 117 mg/dL    Performed by Singh Cannon and Plan:      37yo [id number] @ 36+3. Pre-e with severe range pressures yesterday requiring tx. +HA. Will proceed with IOL for pre-e with severe features. cvx unfavorable, will need ripening. Will place cook balloon and start miso (discussed with Dr. Fredrick Wood who will be covering). A2DM (likely pregestational). Will cover with sliding scale for now. Have orders for glucose stabilizer (appreciate input of rapid response nurse who helped with orders).     Signed By: Kyree Jordan MD     May 30, 2021

## 2021-05-30 NOTE — PROGRESS NOTES
0700 Assumed care of pt at this time from offgoing nurse, Little Garcia RN.    2106 MD in to see pt, SVE done. 1028 Bedside shift change report given to KWESI Hernandez RN (oncoming nurse) by ELIECER Mims RN (offgoing nurse). Report included the following information SBAR, Kardex, Intake/Output, MAR, Recent Results and Med Rec Status.

## 2021-05-31 LAB
BACTERIA SPEC CULT: NORMAL
BASOPHILS # BLD: 0 K/UL (ref 0–0.1)
BASOPHILS NFR BLD: 0 % (ref 0–1)
DIFFERENTIAL METHOD BLD: ABNORMAL
EOSINOPHIL # BLD: 0 K/UL (ref 0–0.4)
EOSINOPHIL NFR BLD: 0 % (ref 0–7)
ERYTHROCYTE [DISTWIDTH] IN BLOOD BY AUTOMATED COUNT: 13.6 % (ref 11.5–14.5)
GLUCOSE BLD STRIP.AUTO-MCNC: 114 MG/DL (ref 65–117)
HCT VFR BLD AUTO: 26.5 % (ref 35–47)
HGB BLD-MCNC: 8.5 G/DL (ref 11.5–16)
IMM GRANULOCYTES # BLD AUTO: 0 K/UL (ref 0–0.04)
IMM GRANULOCYTES NFR BLD AUTO: 0 % (ref 0–0.5)
LYMPHOCYTES # BLD: 0.9 K/UL (ref 0.8–3.5)
LYMPHOCYTES NFR BLD: 10 % (ref 12–49)
MCH RBC QN AUTO: 26.3 PG (ref 26–34)
MCHC RBC AUTO-ENTMCNC: 32.1 G/DL (ref 30–36.5)
MCV RBC AUTO: 82 FL (ref 80–99)
MONOCYTES # BLD: 0.5 K/UL (ref 0–1)
MONOCYTES NFR BLD: 5 % (ref 5–13)
NEUTS SEG # BLD: 8.4 K/UL (ref 1.8–8)
NEUTS SEG NFR BLD: 85 % (ref 32–75)
NRBC # BLD: 0 K/UL (ref 0–0.01)
NRBC BLD-RTO: 0 PER 100 WBC
PLATELET # BLD AUTO: 238 K/UL (ref 150–400)
PMV BLD AUTO: 11.4 FL (ref 8.9–12.9)
RBC # BLD AUTO: 3.23 M/UL (ref 3.8–5.2)
SERVICE CMNT-IMP: NORMAL
SERVICE CMNT-IMP: NORMAL
WBC # BLD AUTO: 9.9 K/UL (ref 3.6–11)

## 2021-05-31 PROCEDURE — 74011250636 HC RX REV CODE- 250/636: Performed by: OBSTETRICS & GYNECOLOGY

## 2021-05-31 PROCEDURE — 85025 COMPLETE CBC W/AUTO DIFF WBC: CPT

## 2021-05-31 PROCEDURE — 82962 GLUCOSE BLOOD TEST: CPT

## 2021-05-31 PROCEDURE — 36415 COLL VENOUS BLD VENIPUNCTURE: CPT

## 2021-05-31 PROCEDURE — 74011250636 HC RX REV CODE- 250/636: Performed by: ANESTHESIOLOGY

## 2021-05-31 PROCEDURE — 74011250637 HC RX REV CODE- 250/637: Performed by: OBSTETRICS & GYNECOLOGY

## 2021-05-31 PROCEDURE — 59510 CESAREAN DELIVERY: CPT | Performed by: OBSTETRICS & GYNECOLOGY

## 2021-05-31 PROCEDURE — 65270000029 HC RM PRIVATE

## 2021-05-31 PROCEDURE — 74011250636 HC RX REV CODE- 250/636: Performed by: NURSE ANESTHETIST, CERTIFIED REGISTERED

## 2021-05-31 RX ORDER — KETOROLAC TROMETHAMINE 30 MG/ML
30 INJECTION, SOLUTION INTRAMUSCULAR; INTRAVENOUS
Status: COMPLETED | OUTPATIENT
Start: 2021-05-31 | End: 2021-05-31

## 2021-05-31 RX ORDER — OXYCODONE HYDROCHLORIDE 5 MG/1
10 TABLET ORAL
Status: ACTIVE | OUTPATIENT
Start: 2021-05-31 | End: 2021-06-01

## 2021-05-31 RX ORDER — KETOROLAC TROMETHAMINE 30 MG/ML
30 INJECTION, SOLUTION INTRAMUSCULAR; INTRAVENOUS
Status: DISPENSED | OUTPATIENT
Start: 2021-05-31 | End: 2021-06-01

## 2021-05-31 RX ORDER — OXYCODONE HYDROCHLORIDE 5 MG/1
5 TABLET ORAL
Status: ACTIVE | OUTPATIENT
Start: 2021-05-31 | End: 2021-06-01

## 2021-05-31 RX ORDER — ZOLPIDEM TARTRATE 5 MG/1
5 TABLET ORAL
Status: DISCONTINUED | OUTPATIENT
Start: 2021-05-31 | End: 2021-06-02 | Stop reason: HOSPADM

## 2021-05-31 RX ORDER — ONDANSETRON 2 MG/ML
4 INJECTION INTRAMUSCULAR; INTRAVENOUS
Status: DISCONTINUED | OUTPATIENT
Start: 2021-05-31 | End: 2021-06-02 | Stop reason: HOSPADM

## 2021-05-31 RX ORDER — ONDANSETRON 2 MG/ML
4 INJECTION INTRAMUSCULAR; INTRAVENOUS
Status: ACTIVE | OUTPATIENT
Start: 2021-05-31 | End: 2021-06-01

## 2021-05-31 RX ORDER — NALOXONE HYDROCHLORIDE 0.4 MG/ML
0.4 INJECTION, SOLUTION INTRAMUSCULAR; INTRAVENOUS; SUBCUTANEOUS AS NEEDED
Status: DISCONTINUED | OUTPATIENT
Start: 2021-05-31 | End: 2021-06-02 | Stop reason: HOSPADM

## 2021-05-31 RX ORDER — ONDANSETRON 2 MG/ML
INJECTION INTRAMUSCULAR; INTRAVENOUS AS NEEDED
Status: DISCONTINUED | OUTPATIENT
Start: 2021-05-31 | End: 2021-05-31 | Stop reason: HOSPADM

## 2021-05-31 RX ORDER — OXYTOCIN/RINGER'S LACTATE 30/500 ML
87.3 PLASTIC BAG, INJECTION (ML) INTRAVENOUS AS NEEDED
Status: COMPLETED | OUTPATIENT
Start: 2021-05-31 | End: 2021-05-31

## 2021-05-31 RX ORDER — DIPHENHYDRAMINE HYDROCHLORIDE 50 MG/ML
12.5 INJECTION, SOLUTION INTRAMUSCULAR; INTRAVENOUS
Status: DISCONTINUED | OUTPATIENT
Start: 2021-05-31 | End: 2021-06-02 | Stop reason: HOSPADM

## 2021-05-31 RX ORDER — ACETAMINOPHEN 325 MG/1
650 TABLET ORAL
Status: DISCONTINUED | OUTPATIENT
Start: 2021-05-31 | End: 2021-06-02 | Stop reason: HOSPADM

## 2021-05-31 RX ORDER — SWAB
1 SWAB, NON-MEDICATED MISCELLANEOUS DAILY
Status: DISCONTINUED | OUTPATIENT
Start: 2021-05-31 | End: 2021-06-02 | Stop reason: HOSPADM

## 2021-05-31 RX ORDER — IBUPROFEN 800 MG/1
800 TABLET ORAL EVERY 8 HOURS
Status: DISCONTINUED | OUTPATIENT
Start: 2021-05-31 | End: 2021-06-02 | Stop reason: HOSPADM

## 2021-05-31 RX ORDER — CALCIUM CARBONATE 200(500)MG
200 TABLET,CHEWABLE ORAL
Status: DISCONTINUED | OUTPATIENT
Start: 2021-05-31 | End: 2021-06-02 | Stop reason: HOSPADM

## 2021-05-31 RX ORDER — SODIUM CHLORIDE, SODIUM LACTATE, POTASSIUM CHLORIDE, CALCIUM CHLORIDE 600; 310; 30; 20 MG/100ML; MG/100ML; MG/100ML; MG/100ML
125 INJECTION, SOLUTION INTRAVENOUS CONTINUOUS
Status: DISCONTINUED | OUTPATIENT
Start: 2021-05-31 | End: 2021-05-31

## 2021-05-31 RX ORDER — SODIUM CHLORIDE 0.9 % (FLUSH) 0.9 %
5-40 SYRINGE (ML) INJECTION AS NEEDED
Status: DISCONTINUED | OUTPATIENT
Start: 2021-05-31 | End: 2021-06-02 | Stop reason: HOSPADM

## 2021-05-31 RX ORDER — NALOXONE HYDROCHLORIDE 0.4 MG/ML
0.2 INJECTION, SOLUTION INTRAMUSCULAR; INTRAVENOUS; SUBCUTANEOUS
Status: ACTIVE | OUTPATIENT
Start: 2021-05-31 | End: 2021-06-01

## 2021-05-31 RX ORDER — DIPHENHYDRAMINE HYDROCHLORIDE 50 MG/ML
12.5 INJECTION, SOLUTION INTRAMUSCULAR; INTRAVENOUS
Status: ACTIVE | OUTPATIENT
Start: 2021-05-31 | End: 2021-06-01

## 2021-05-31 RX ORDER — OXYCODONE AND ACETAMINOPHEN 5; 325 MG/1; MG/1
1-2 TABLET ORAL
Status: DISCONTINUED | OUTPATIENT
Start: 2021-05-31 | End: 2021-06-02 | Stop reason: HOSPADM

## 2021-05-31 RX ORDER — SIMETHICONE 80 MG
80 TABLET,CHEWABLE ORAL AS NEEDED
Status: DISCONTINUED | OUTPATIENT
Start: 2021-05-31 | End: 2021-06-02 | Stop reason: HOSPADM

## 2021-05-31 RX ORDER — OXYTOCIN/RINGER'S LACTATE 30/500 ML
10 PLASTIC BAG, INJECTION (ML) INTRAVENOUS AS NEEDED
Status: DISCONTINUED | OUTPATIENT
Start: 2021-05-31 | End: 2021-06-02 | Stop reason: HOSPADM

## 2021-05-31 RX ORDER — SODIUM CHLORIDE 0.9 % (FLUSH) 0.9 %
5-40 SYRINGE (ML) INJECTION EVERY 8 HOURS
Status: DISCONTINUED | OUTPATIENT
Start: 2021-05-31 | End: 2021-06-02

## 2021-05-31 RX ADMIN — OXYTOCIN 75 MILLI-UNITS/MIN: 10 INJECTION, SOLUTION INTRAMUSCULAR; INTRAVENOUS at 04:07

## 2021-05-31 RX ADMIN — SODIUM CHLORIDE, POTASSIUM CHLORIDE, SODIUM LACTATE AND CALCIUM CHLORIDE 75 ML/HR: 600; 310; 30; 20 INJECTION, SOLUTION INTRAVENOUS at 10:56

## 2021-05-31 RX ADMIN — KETOROLAC TROMETHAMINE 30 MG: 30 INJECTION, SOLUTION INTRAMUSCULAR at 20:36

## 2021-05-31 RX ADMIN — MAGNESIUM SULFATE HEPTAHYDRATE 2 G/HR: 40 INJECTION, SOLUTION INTRAVENOUS at 10:56

## 2021-05-31 RX ADMIN — SODIUM CHLORIDE, POTASSIUM CHLORIDE, SODIUM LACTATE AND CALCIUM CHLORIDE 75 ML/HR: 600; 310; 30; 20 INJECTION, SOLUTION INTRAVENOUS at 00:55

## 2021-05-31 RX ADMIN — ONDANSETRON HYDROCHLORIDE 4 MG: 2 SOLUTION INTRAMUSCULAR; INTRAVENOUS at 00:17

## 2021-05-31 RX ADMIN — OXYCODONE HYDROCHLORIDE AND ACETAMINOPHEN 2 TABLET: 5; 325 TABLET ORAL at 17:31

## 2021-05-31 RX ADMIN — KETOROLAC TROMETHAMINE 30 MG: 30 INJECTION, SOLUTION INTRAMUSCULAR at 13:49

## 2021-05-31 RX ADMIN — KETOROLAC TROMETHAMINE 30 MG: 30 INJECTION, SOLUTION INTRAMUSCULAR at 07:48

## 2021-05-31 RX ADMIN — KETOROLAC TROMETHAMINE 30 MG: 30 INJECTION, SOLUTION INTRAMUSCULAR at 01:20

## 2021-05-31 RX ADMIN — MAGNESIUM SULFATE HEPTAHYDRATE 2 G/HR: 40 INJECTION, SOLUTION INTRAVENOUS at 20:39

## 2021-05-31 NOTE — PROGRESS NOTES
Billing Note.  This is Dr. Leon [facility] maternity patient, delivered on 5/30 by hospitalist.

## 2021-05-31 NOTE — ANESTHESIA POSTPROCEDURE EVALUATION
* No procedures listed *.    epidural    Anesthesia Post Evaluation      Multimodal analgesia: multimodal analgesia not used between 6 hours prior to anesthesia start to PACU discharge  Patient location during evaluation: bedside  Patient participation: complete - patient participated  Level of consciousness: awake and alert  Pain score: 1  Pain management: satisfactory to patient  Airway patency: patent  Anesthetic complications: no  Cardiovascular status: acceptable  Respiratory status: acceptable  Hydration status: acceptable  Post anesthesia nausea and vomiting:  none  Final Post Anesthesia Temperature Assessment:  Normothermia (36.0-37.5 degrees C)      INITIAL Post-op Vital signs:   Vitals Value Taken Time   BP     Temp     Pulse     Resp     SpO2 97 % 05/31/21 0706   Vitals shown include unvalidated device data.

## 2021-05-31 NOTE — OP NOTES
[facility] 77  OPERATIVE REPORT    Name:  Dionne Melgar  MR#:  [id number]  :  1985  ACCOUNT #:  [de-identified]  DATE OF SERVICE:  2021    PREOPERATIVE DIAGNOSES:  1.  A 36 and 4 days' intrauterine pregnancy. 2.  Preeclampsia with severe features. 3.  Fully dilated. 4.  Transverse back down presentation. POSTOPERATIVE DIAGNOSES:  1.  A 36 and 4 days' intrauterine pregnancy. 2.  Preeclampsia with severe features. 3.  Fully dilated. 4.  Transverse back down presentation. 5.  Live born female. PROCEDURE PERFORMED:  Low transverse . SURGEON:  Anushka Ward MD    ASSISTANT:  None. ANESTHESIA:  Epidural.    COMPLICATIONS:  None. SPECIMENS REMOVED:  Placenta, discarded. IMPLANTS:  Gelfoam.    ESTIMATED BLOOD LOSS:  800    DRAINS:  None. FINDINGS:  Live born female infant with Apgars of 7 and 7. PREOPERATIVE NOTE:  Called to the patient's room as Linda bulb was expelled and the patient was found to be completely dilated with a bulging bag and no presenting part palpable. I did ultrasound and found the patient to be transverse presentation with head up. So the decision was made for emergent  based on presentation. PROCEDURE:  The patient was taken to the OR, prepped and draped in a normal sterile fashion. A Pfannenstiel skin incision was carried through to the level of the fascia. The fascia was scored at the midline and extended laterally with Echevarria scissors. The fascia was tented up from the rectus abdominal muscles below and  from it with Echevarria scissors. The midline was entered bluntly and extended bluntly. Charles retractor was placed. Bladder flap was created and a low transverse incision was made on the uterus. Entry into the amniotic sac revealed copious amounts of clear-colored fluid. At this time, I noted the infant to be back down and transverse with the head to the patient's right.   I grasped the legs from the patient's left side and was able to deliver the legs one by one and then the infant up to the shoulders. I rotated the baby, flexed the head and the baby was easily delivered, a crying female infant with Apgars of 7 and 7. Cord was doubly clamped and cut. Infant was handed to waiting Nursery. Placenta delivered spontaneously intact with a three-vessel cord. The uterus was cleared of all clot and debris and repaired in two layers, first a running locking stitch of 0 Vicryl, second a running imbricating stitch of 0 Monocryl. At this time, it was not hemostatic and a third layer was used with 0 Vicryl and assured great hemostasis. There was a small extension into the right broad ligament that was included in the uterine incision and found to be hemostatic. The Cynthia Frisk was then removed. Bladder blade was placed for inspection of the incision and the incision was found to be hemostatic. A piece of Gelfoam was placed on the uterine incision. Colic gutters were cleared of all clot and debris. I attempted to reapproximate muscles but there was a large diastasis and the muscle tissue was friable so this was abandoned. I then reapproximated fascia with 0 PDS in a running nonlocking fashion. Subcutaneous tissue was reapproximated with 2-0 Vicryl in a running nonlocking fashion and skin was reapproximated with 4-0 Monocryl in a running subcuticular fashion. A sterile dressing was placed and the patient was taken to recovery room in stable condition.       Delta Rossi MD      [id number]  D:  05/31/2021 1:05  T:  05/31/2021 8:50  JOB #:  [id number]

## 2021-05-31 NOTE — PROGRESS NOTES
2300:  Randolph Mims MD at bedside, scanned via ultrasound, presenting part feet. 2305Soco Sneed MD attempting cephalic version at bedside for breech presentation with ultrasound.   2308: Unsuccessful, c/s called now

## 2021-05-31 NOTE — PROGRESS NOTES
8430: Bedside, Verbal and Written shift change report given to LIONEL Jain RN (oncoming nurse) by Elie Shah RN (offgoing nurse). Report included the following information SBAR, Kardex, Intake/Output, MAR, Recent Results, Med Rec Status and Alarm Parameters . 1550: Pt assisted to standing at bedside and pads changed. Pt unable to ambulate at this time. Pt assisted back in bed.     1900: Bedside, Verbal and Written shift change report given to Andrei Sharma (oncoming nurse) by [employer] Booker WANG (offgoing nurse). Report included the following information SBAR, Kardex, Intake/Output, MAR, Recent Results, Med Rec Status, Alarm Parameters  and Quality Measures   .

## 2021-05-31 NOTE — PROGRESS NOTES
Called to room to secondary to RN  unable to feel presenting part. Cook catheter fell out and pt is anterior lip with bulging bag. Ultrasound reveals breech presentation. R/B/I discussed and ECV attempted without success.   R/B/I discussed and will proceed with PLTCS for breech presentation

## 2021-05-31 NOTE — PROGRESS NOTES
1900: Bedside and Verbal shift change report given to YARY Terrell RN (oncoming nurse) by KWESI Jain RN (offgoing nurse). Report included the following information SBAR, Kardex, Intake/Output, MAR, Recent Results and Med Rec Status. 1905: Pt repositioned in bed by YARY Terrell RN and KWESI Jain RN. No needs expressed. 2358: Magnesium completed per MD order. 0000: Pt Abd dressing removed per RN. Pt tolerated well. Incision site clean, dry, and intact. 0225: TRANSFER - OUT REPORT:    Verbal report given to ELIECER Coppola RN on [facility] Missed  being transferred to [facility]U for routine progression of care       Report consisted of patients Situation, Background, Assessment and   Recommendations(SBAR). Information from the following report(s) SBAR, Kardex, Procedure Summary, Intake/Output, MAR, Recent Results and Med Rec Status was reviewed with the receiving nurse. Lines:   Peripheral IV 05/30/21 Anterior;Distal;Left Forearm (Active)   Site Assessment Clean, dry, & intact 05/31/21 1939   Phlebitis Assessment 0 05/31/21 1939   Infiltration Assessment 0 05/31/21 1939   Dressing Status Clean, dry, & intact 05/31/21 1939   Dressing Type Tape;Transparent 05/31/21 1939   Hub Color/Line Status Pink; Infusing 05/31/21 1939        Opportunity for questions and clarification was provided.       Patient transported with:   Registered Nurse

## 2021-05-31 NOTE — PROGRESS NOTES
Post-Operative  Progress Note      Information for the patient's :  Missed, Female Chris Culver [[id number]]   , Low Transverse      Patient doing well without significant complaint. Nausea and vomiting resolved. She is tolerating oral intake. Pain well controlled. She denies ha/cp/sob vision changes/ ruq pain. Postop pain well controlled. Delivery information:  Information for the patient's :  Missed, Ashwin Culver [[id number]]   Delivery of a 7 lb 15 oz (3.6 kg) female infant via , Low Transverse on 2021 at 11:53 PM  by Sharita Gandhi. Apgars were 7  and 7 .        Vitals:  Patient Vitals for the past 12 hrs:   Temp Pulse Resp BP SpO2   21 0741     97 %   21 0739  93  (!) 115/59    21 0652 97.9 °F (36.6 °C) 93 16 130/70    21 0651     99 %   21 0646     99 %   21 0641     97 %   21 0636     97 %   21 0631     97 %   21 0626     97 %   21 0621     97 %   21 0616     97 %   21 0611     96 %   21 0606     97 %   21 0601     97 %   21 0556     97 %   21 0550     97 %   21 0545     97 %   21 0540  61   (!) 82 %   21 0539   18 129/65    21 0530     94 %   21 0525     96 %   21 0445     98 %   21 0443     91 %   21 0440     96 %   21 0435     97 %   21 0430  (!) 105 16 130/60 97 %   21 0425     97 %   21 0420     97 %   21 0415     96 %   21 0410     97 %   21 0405     97 %   21 0403  96  (!) 148/72    21 0400     97 %   21 0355     97 %   21 0350     97 %   21 0348  93  134/67    21 0345     97 %   21 0340     97 %   21 0335     97 %   21 0333  93 16 136/71    21 0330     98 %   21 1015 [facility]  97 %   05/31/21 0320     97 %   05/31/21 0318  94  (!) 142/76    05/31/21 0315     97 %   05/31/21 0310     97 %   05/31/21 0305     98 %   05/31/21 0303  96  (!) 146/77    05/31/21 0300     98 %   05/31/21 0255     98 %   05/31/21 0250     98 %   05/31/21 0248  97  (!) 148/72    05/31/21 0245     97 %   05/31/21 0240     98 %   05/31/21 0235     97 %   05/31/21 0233  86 16 138/66    05/31/21 0230     97 %   05/31/21 0225     98 %   05/31/21 0220     98 %   05/31/21 0218  95  (!) 149/77    05/31/21 0215     97 %   05/31/21 0210     98 %   05/31/21 0205     98 %   05/31/21 0203  86  139/67    05/31/21 0200     98 %   05/31/21 0155     98 %   05/31/21 0145     98 %   05/31/21 0140     99 %   05/31/21 0135     99 %   05/31/21 0133  83 18 (!) 151/80    05/31/21 0130     99 %   05/31/21 0125     100 %   05/31/21 0120     100 %   05/31/21 0116  88  (!) 158/90    05/31/21 0115     100 %   05/31/21 0113  83  (!) 151/73    05/31/21 0110  87  (!) 151/72 100 %   05/31/21 0108  87  (!) 154/72    05/31/21 0105  85  134/65 100 %   05/31/21 0102  87  (!) 154/72    05/31/21 0100     99 %   05/31/21 0057 98.4 °F (36.9 °C) 86 12 (!) 167/95 100 %   05/31/21 0056  84  (!) 156/77    05/30/21 2306     98 %   05/30/21 2301     97 %   05/30/21 2300  87  138/67    05/30/21 2256     98 %   05/30/21 2251     98 %   05/30/21 2246  91  136/88 97 %   05/30/21 2241     96 %   05/30/21 2236     97 %   05/30/21 2231  84 18 129/60 95 %   05/30/21 2226     95 %   05/30/21 2225     94 %   05/30/21 2221     95 %   05/30/21 2216     95 %   05/30/21 2215  83  126/60    05/30/21 2211     96 %   05/30/21 2206     96 %   05/30/21 2201     95 %   05/30/21 2200  83  129/63    05/30/21 2156     96 %   05/30/21 2151     96 %   05/30/21   87  137/73 97 %   21     97 %   21     97 %   21     98 %   21  93 16 (!) 144/92    21     97 %   21     97 %   21     97 %   21 98.1 °F (36.7 °C) 90  (!) 141/81    21     98 %   21     98 %   21  87  136/79 98 %       Temp (24hrs), Av °F (36.7 °C), Min:97.7 °F (36.5 °C), Max:98.4 °F (36.9 °C)      Last 24hr Input/Output:    Intake/Output Summary (Last 24 hours) at 2021 2434  Last data filed at 2021 0894  Gross per 24 hour   Intake 4131.09 ml   Output 2923 ml   Net 1208.09 ml        Exam:    Gen: Patient without distress  Lungs: clear to ascultation bilaterally  Cor: S1, S2, regular rate and rhythm  Abdomen: bowel sounds present, soft, appropriate tenderness, fundus firm   Incision: clean, dry and intact dressing  Lower extremities: negative for cords or tenderness, trace edema bilaterally    Labs:   Recent Results (from the past 24 hour(s))   GLUCOSE, POC    Collection Time: 21 11:13 AM   Result Value Ref Range    Glucose (POC) 91 65 - 117 mg/dL    Performed by [facility]    GLUCOSE, POC    Collection Time: 21  2:52 PM   Result Value Ref Range    Glucose (POC) 86 65 - 117 mg/dL    Performed by [facility]    GLUCOSE, POC    Collection Time: 21  6:26 PM   Result Value Ref Range    Glucose (POC) 72 65 - 117 mg/dL    Performed by [facility]    GLUCOSE, POC    Collection Time: 21  9:37 PM   Result Value Ref Range    Glucose (POC) 106 65 - 117 mg/dL    Performed by Sarah Ca    GLUCOSE, POC    Collection Time: 21  8:09 AM   Result Value Ref Range    Glucose (POC) 114 65 - 117 mg/dL    Performed by [facility]    CBC WITH AUTOMATED DIFF    Collection Time: 21  8:10 AM   Result Value Ref Range    WBC 9.9 3.6 - 11.0 K/uL    RBC 3.23 (L) 3.80 - 5.20 M/uL    HGB 8.5 (L) 11.5 - 16.0 g/dL    HCT 26.5 (L) 35.0 - 47.0 %    MCV 82.0 80.0 - 99.0 FL    MCH 26.3 26.0 - 34.0 PG    MCHC 32.1 30.0 - 36.5 g/dL    RDW 13.6 11.5 - 14.5 %    PLATELET 554 866 - 290 K/uL    MPV 11.4 8.9 - 12.9 FL    NRBC 0.0 0  WBC    ABSOLUTE NRBC 0.00 0.00 - 0.01 K/uL    NEUTROPHILS 85 (H) 32 - 75 %    LYMPHOCYTES 10 (L) 12 - 49 %    MONOCYTES 5 5 - 13 %    EOSINOPHILS 0 0 - 7 %    BASOPHILS 0 0 - 1 %    IMMATURE GRANULOCYTES 0 0.0 - 0.5 %    ABS. NEUTROPHILS 8.4 (H) 1.8 - 8.0 K/UL    ABS. LYMPHOCYTES 0.9 0.8 - 3.5 K/UL    ABS. MONOCYTES 0.5 0.0 - 1.0 K/UL    ABS. EOSINOPHILS 0.0 0.0 - 0.4 K/UL    ABS. BASOPHILS 0.0 0.0 - 0.1 K/UL    ABS. IMM. GRANS. 0.0 0.00 - 0.04 K/UL    DF AUTOMATED         Lab Results   Component Value Date/Time    HGB 8.5 (L) 2021 08:10 AM       Assessment:   Post-Op , stable  preeclampsia on magnesium for seizure prophylaxis x 24 hr pp if stable, no s/sx toxicity, stable BP  POP CS day 1  POP anemia, acute intraoperative blood loss, stable  GDM vs type 2 DM      Plan:     1. Routine post-operative care  2. Encouraged out of bed and ambulation  3. Oral pain medications  4. Advance diet  5. Continue postpartum and  teaching by nursing  6. Fasting BS tomorrow am  7. Continue magnesium for seizure prophylaxis, monitor for s/sx toxicity   8.  Hold labetalol  Naty Briones MD

## 2021-05-31 NOTE — LACTATION NOTE
This note was copied from a baby's chart. Mother resting in bed. Mother states she does not need any assistance with BF as this is her 6th child to BF. Mother asking about pumping, questions answered. Mother states baby is sleepy and only nurses for 5 minutes. Mother reassured, BF basics reviewed. Discussed with mother her plan for feeding. Reviewed the benefits of exclusive breast milk feeding during the hospital stay. Informed her of the risks of using formula to supplement in the first few days of life as well as the benefits of successful breast milk feeding; referred her to the Breastfeeding booklet about this information. She acknowledges understanding of information reviewed and states that it is her plan to breastfeed her infant. Will support her choice and offer additional information as needed. Reviewed breastfeeding basics:  How milk is made and normal  breastfeeding behaviors discussed. Supply and demand,  stomach size, early feeding cues, skin to skin bonding with comfortable positioning and baby led latch-on reviewed. How to identify signs of successful breastfeeding sessions reviewed; education on assymetrical latch, signs of effective latching vs shallow, in-effective latching, normal  feeding frequency and duration and expected infant output discussed. Normal course of breastfeeding discussed including the AAP's recommendation that children receive exclusive breast milk feedings for the first six months of life with breast milk feedings to continue through the first year of life and/or beyond as complimentary table foods are added. Breastfeeding Booklet and Warm line information provided with discussion. Discussed typical  weight loss and the importance of pediatrician appointment within 24-48 hours of discharge, at 2 weeks of life and normalcy of requesting pediatric weight checks as needed in between visits.     Hand Expression Education:  Mom taught how to manually hand express her colostrum. Emphasized the importance of providing infant with valuable colostrum as infant rests skin to skin at breast.  Aware to avoid extended periods of non-feeding. Aware to offer 10-20+ drops of colostrum every 2-3 hours until infant is latching and nursing effectively. Taught the rationale behind this low tech but highly effective evidence based practice. Pt will successfully establish breastfeeding by feeding in response to early feeding cues or wake every 3h, will obtain deep latch, and will keep log of feedings/output. Taught to BF at hunger cues and or q 2-3 hrs and to offer 10-20 drops of hand expressed colostrum at any non-feeds.       Breast Assessment  Left Breast: Medium  Left Nipple: Everted, Intact  Right Breast: Medium  Right Nipple: Everted, Intact  Breast- Feeding Assessment  Attends Breast-Feeding Classes: No  Breast-Feeding Experience: Yes (6th child to BF)  Breast Trauma/Surgery: No  Type/Quality: Good  Lactation Consultant Visits  Breast-Feedings: Good   Mother/Infant Observation  Mother Observation: Breast comfortable  LATCH Documentation  Latch:  (did not see at breast today)

## 2021-05-31 NOTE — BRIEF OP NOTE
Brief Postoperative Note    Patient: Rosaline Grady  YOB: 1985  MRN: [id number]    Date of Procedure: 2021     Pre-Op Diagnosis: Breech, fullly dilated    Post-Op Diagnosis: transverse back down, liveborn female      Procedure(s):   [facility]CTION[facility]    Surgeon(s):  Shelbi Carlin MD    Surgical Assistant: None    Anesthesia:epidural     Estimated Blood Loss (mL): 739    Complications: None    Specimens: placenta discarded    Implants:gelfoam  Drains: none    Findings: liveborn female infant with Apgars of 7, 7    Electronically Signed by Gopal Bojorquez MD on 2021 at 12:38 AM

## 2021-05-31 NOTE — PROGRESS NOTES
2058 - Patient vomited, cook catheter in bed. Pads changed and Cook catheter disposed of.    Gulshan Carter, to discuss plan of care, after SVE 9/100 can not feel presenting part only, will come to room to assess    2300 - Dr. Mathieu Carter at bedside to assess patient and do ultrasound    2327 - Ancef given to anesthesia and given by anesthesia. 65 - Unable to assess QBL due to large amounts of amniotic fluid from AROM during section.  ml per Dr. Mathieu Carter    2268 - Verbal shift change report given to [address] [place] [address] (oncoming nurse) by Love Figueroa (offgoing nurse). Report included the following information SBAR, Procedure Summary, Intake/Output, MAR and Recent Results.

## 2021-06-01 LAB
BASOPHILS # BLD: 0 K/UL (ref 0–0.1)
BASOPHILS NFR BLD: 0 % (ref 0–1)
DIFFERENTIAL METHOD BLD: ABNORMAL
EOSINOPHIL # BLD: 0 K/UL (ref 0–0.4)
EOSINOPHIL NFR BLD: 0 % (ref 0–7)
ERYTHROCYTE [DISTWIDTH] IN BLOOD BY AUTOMATED COUNT: 14 % (ref 11.5–14.5)
GLUCOSE P FAST SERPL-MCNC: 98 MG/DL (ref 65–100)
HCT VFR BLD AUTO: 26.5 % (ref 35–47)
HGB BLD-MCNC: 8.3 G/DL (ref 11.5–16)
IMM GRANULOCYTES # BLD AUTO: 0 K/UL (ref 0–0.04)
IMM GRANULOCYTES NFR BLD AUTO: 0 % (ref 0–0.5)
LYMPHOCYTES # BLD: 1.2 K/UL (ref 0.8–3.5)
LYMPHOCYTES NFR BLD: 13 % (ref 12–49)
MCH RBC QN AUTO: 25.9 PG (ref 26–34)
MCHC RBC AUTO-ENTMCNC: 31.3 G/DL (ref 30–36.5)
MCV RBC AUTO: 82.6 FL (ref 80–99)
MONOCYTES # BLD: 0.5 K/UL (ref 0–1)
MONOCYTES NFR BLD: 6 % (ref 5–13)
NEUTS SEG # BLD: 7.2 K/UL (ref 1.8–8)
NEUTS SEG NFR BLD: 81 % (ref 32–75)
NRBC # BLD: 0 K/UL (ref 0–0.01)
NRBC BLD-RTO: 0 PER 100 WBC
PLATELET # BLD AUTO: 262 K/UL (ref 150–400)
PMV BLD AUTO: 11 FL (ref 8.9–12.9)
RBC # BLD AUTO: 3.21 M/UL (ref 3.8–5.2)
WBC # BLD AUTO: 9.1 K/UL (ref 3.6–11)

## 2021-06-01 PROCEDURE — 74011250637 HC RX REV CODE- 250/637: Performed by: OBSTETRICS & GYNECOLOGY

## 2021-06-01 PROCEDURE — 36415 COLL VENOUS BLD VENIPUNCTURE: CPT

## 2021-06-01 PROCEDURE — 65270000029 HC RM PRIVATE

## 2021-06-01 PROCEDURE — 82947 ASSAY GLUCOSE BLOOD QUANT: CPT

## 2021-06-01 PROCEDURE — 77030036554

## 2021-06-01 PROCEDURE — 85025 COMPLETE CBC W/AUTO DIFF WBC: CPT

## 2021-06-01 RX ORDER — DOCUSATE SODIUM 100 MG/1
100 CAPSULE, LIQUID FILLED ORAL DAILY
Status: DISCONTINUED | OUTPATIENT
Start: 2021-06-01 | End: 2021-06-02 | Stop reason: HOSPADM

## 2021-06-01 RX ADMIN — IBUPROFEN 800 MG: 800 TABLET, FILM COATED ORAL at 12:09

## 2021-06-01 RX ADMIN — OXYCODONE HYDROCHLORIDE AND ACETAMINOPHEN 2 TABLET: 5; 325 TABLET ORAL at 20:43

## 2021-06-01 RX ADMIN — IBUPROFEN 800 MG: 800 TABLET, FILM COATED ORAL at 20:43

## 2021-06-01 RX ADMIN — DOCUSATE SODIUM 100 MG: 100 CAPSULE, LIQUID FILLED ORAL at 09:33

## 2021-06-01 RX ADMIN — OXYCODONE HYDROCHLORIDE AND ACETAMINOPHEN 2 TABLET: 5; 325 TABLET ORAL at 10:09

## 2021-06-01 RX ADMIN — Medication 1 TABLET: at 09:33

## 2021-06-01 RX ADMIN — IBUPROFEN 800 MG: 800 TABLET, FILM COATED ORAL at 04:20

## 2021-06-01 RX ADMIN — OXYCODONE HYDROCHLORIDE AND ACETAMINOPHEN 2 TABLET: 5; 325 TABLET ORAL at 05:52

## 2021-06-01 RX ADMIN — OXYCODONE HYDROCHLORIDE AND ACETAMINOPHEN 2 TABLET: 5; 325 TABLET ORAL at 16:11

## 2021-06-01 RX ADMIN — OXYCODONE HYDROCHLORIDE AND ACETAMINOPHEN 2 TABLET: 5; 325 TABLET ORAL at 01:57

## 2021-06-01 NOTE — PROGRESS NOTES
0815: This RN asking Dr. Zettie Severance about 2 hour post-prandial blood sugar orders. Dr. Zettie Severance stated this is no longer necessary.

## 2021-06-01 NOTE — ROUTINE PROCESS
TRANSFER - IN REPORT: 
 
Verbal report received from YARY Terrell RN(name) on Rosaline Missed  being received from L&D(unit) for routine progression of care Report consisted of patients Situation, Background, Assessment and  
Recommendations(SBAR). Information from the following report(s) SBAR, Intake/Output, MAR and Recent Results was reviewed with the receiving nurse. Opportunity for questions and clarification was provided. Assessment completed upon patients arrival to unit and care assumed.

## 2021-06-01 NOTE — PROGRESS NOTES
Post-Operative Day Number 2 Progress Note    Patient doing well post-op day 2 from  delivery without significant complaints. Pain controlled on current medication. Voiding without difficulty, normal lochia. Vitals:    Patient Vitals for the past 8 hrs:   BP Temp Pulse Resp SpO2   21 0550 117/66 98.1 °F (36.7 °C) 89 18    21 0227 138/76 98.9 °F (37.2 °C) 92 18    21 0139 119/64 98.2 °F (36.8 °C) 89 14 98 %   21 0134     98 %   21 0129     98 %   21 0124     98 %   21 0119     98 %   21 0114     98 %   21 0109     98 %   21 0104     99 %   21 0054     98 %   21 0049     98 %   21 0044     99 %   21 0039 130/72  82  99 %   21 0034     99 %   21 0024     99 %     Temp (24hrs), Av.2 °F (36.8 °C), Min:97.9 °F (36.6 °C), Max:98.9 °F (37.2 °C)      Vital signs stable, afebrile. Exam:  Patient without distress. Abdomen soft, fundus firm at level of umbilicus, nontender. Incision dry and clean without erythema. Lower extremities are negative for swelling, cords or tenderness. Assessment and Plan:   POD #2 s/p c/s for breech, pre-eclampsia, BPs significantly improved. Continue routine post-op care and maternal education.

## 2021-06-01 NOTE — ROUTINE PROCESS
Bedside and Verbal shift change report given to UMAIR Tejeda RN (oncoming nurse) by Radha Hampton RN (offgoing nurse). Report included the following information SBAR, Kardex, Intake/Output, MAR and Recent Results.

## 2021-06-01 NOTE — LACTATION NOTE
This note was copied from a baby's chart. Went in for lactation visit with mother, mother states she is both breast and formula feeding. Baby is latching well. This is mothers 6th child to breastfeed and she fed her last 5 children for 1-2 years each. Pt chooses to do both breast and bottle. Discussed effects of early supplementation on breastfeeding success; may decrease breastmilk production and supply, increase risk for pathological engorgement, baby may develop preference for faster flow from bottles vs breast, and baby's stomach can be stretched if larger volumes of formula are given. Mother states baby just fed before LC came in, mother declines offer to come back for next feed, states she does not need assistance. Encouraged mother to all [facility] if she changes her mind.

## 2021-06-02 VITALS
SYSTOLIC BLOOD PRESSURE: 143 MMHG | RESPIRATION RATE: 16 BRPM | TEMPERATURE: 98.4 F | OXYGEN SATURATION: 98 % | WEIGHT: 207 LBS | DIASTOLIC BLOOD PRESSURE: 77 MMHG | HEIGHT: 64 IN | HEART RATE: 88 BPM | BODY MASS INDEX: 35.34 KG/M2

## 2021-06-02 PROCEDURE — 74011250637 HC RX REV CODE- 250/637: Performed by: OBSTETRICS & GYNECOLOGY

## 2021-06-02 PROCEDURE — 2709999900 HC NON-CHARGEABLE SUPPLY

## 2021-06-02 RX ORDER — IBUPROFEN 800 MG/1
800 TABLET ORAL EVERY 8 HOURS
Qty: 60 TABLET | Refills: 0 | Status: SHIPPED | OUTPATIENT
Start: 2021-06-02 | End: 2021-07-23

## 2021-06-02 RX ORDER — OXYCODONE AND ACETAMINOPHEN 5; 325 MG/1; MG/1
1 TABLET ORAL
Qty: 20 TABLET | Refills: 0 | Status: SHIPPED | OUTPATIENT
Start: 2021-06-02 | End: 2021-06-05

## 2021-06-02 RX ADMIN — DOCUSATE SODIUM 100 MG: 100 CAPSULE, LIQUID FILLED ORAL at 08:14

## 2021-06-02 RX ADMIN — OXYCODONE HYDROCHLORIDE AND ACETAMINOPHEN 2 TABLET: 5; 325 TABLET ORAL at 04:17

## 2021-06-02 RX ADMIN — OXYCODONE HYDROCHLORIDE AND ACETAMINOPHEN 2 TABLET: 5; 325 TABLET ORAL at 00:14

## 2021-06-02 RX ADMIN — Medication 1 TABLET: at 08:14

## 2021-06-02 RX ADMIN — OXYCODONE HYDROCHLORIDE AND ACETAMINOPHEN 2 TABLET: 5; 325 TABLET ORAL at 08:14

## 2021-06-02 RX ADMIN — OXYCODONE HYDROCHLORIDE AND ACETAMINOPHEN 2 TABLET: 5; 325 TABLET ORAL at 12:12

## 2021-06-02 RX ADMIN — IBUPROFEN 800 MG: 800 TABLET, FILM COATED ORAL at 04:17

## 2021-06-02 RX ADMIN — IBUPROFEN 800 MG: 800 TABLET, FILM COATED ORAL at 12:12

## 2021-06-02 NOTE — DISCHARGE SUMMARY
Obstetrical Discharge Summary     Name: Rui Grady MRN: [id number]  SSN: [id number]    YOB: 1985  Age: 43 y.o. Sex: female      Admit Date: 2021    Discharge Date: 2021     Admitting Physician: Clary Benítez MD     Attending Physician:  Rustam Loja MD     * Admission Diagnoses: Antepartum mild preeclampsia [O14.00]; Pregnancy [Z34.90]    * Discharge Diagnoses:   Information for the patient's :  Miguel A, Female Rui Muhammad [[id number]]   Delivery of a 7 lb 15 oz (3.6 kg) female infant via , Low Transverse on 2021 at 11:53 PM  by Jonas Neville. Apgars were 7  and 7 . Additional Diagnoses:   Hospital Problems as of 2021 Date Reviewed: 2021        Codes Class Noted - Resolved POA    Pregnancy ICD-10-CM: Z34.90  ICD-9-CM: V22.2  2021 - Present Unknown        Antepartum mild preeclampsia ICD-10-CM: O14.00  ICD-9-CM: 642.43  2021 - Present Unknown             Lab Results   Component Value Date/Time    ABO/Rh(D) A POSITIVE 10/10/2018 11:46 PM    Rubella, External Immune 2020 12:00 AM    ABO,Rh A positive 2020 12:00 AM      Immunization History   Administered Date(s) Administered    Tdap 2021       * Procedures:     Procedure(s):   SECTION      * Discharge Condition: stable    * Hospital Course: Normal hospital course following the delivery. * Disposition: home with office follow-up    Discharge Medications:   Current Discharge Medication List      START taking these medications    Details   ibuprofen (MOTRIN) 800 mg tablet Take 1 Tablet by mouth every eight (8) hours. Qty: 60 Tablet, Refills: 0  Start date: 2021      oxyCODONE-acetaminophen (PERCOCET) 5-325 mg per tablet Take 1 Tablet by mouth every four (4) hours as needed for Pain for up to 3 days. Max Daily Amount: 6 Tablets.   Qty: 20 Tablet, Refills: 0  Start date: 2021, End date: 2021    Associated Diagnoses: Postpartum pain             * Follow-up Care/Patient Instructions:   Activity: activity as tolerated  Diet: general  Wound Care: as directed    Follow-up Information     Follow up With Specialties Details Why Contact Info    None    None ([phone]) Patient stated that they have no PCP      Rustam Loja MD Obstetrics & Gynecology, Gynecology, Obstetrics In 1 week  [address] [place] [address]  [phone]             Signed By:  Clary Benítez MD     June 2, 2021

## 2021-06-02 NOTE — PROGRESS NOTES
Discharge instructions and medication regimen provided to patient. Pt will f/u in 1 week with Dr Mike Peters for a BP check. Instructions on incision care provided. Baby staying in [facility]  nursery per mother request since baby is unable to d/c today. Provided her with nursery phone number for her comfort and baby updates.

## 2021-06-02 NOTE — ROUTINE PROCESS
Bedside and Verbal shift change report given to Sanya Salgado RN (oncoming nurse) by Aaron Stiles RN (offgoing nurse). Report included the following information SBAR, Kardex, Intake/Output and MAR.

## 2021-06-02 NOTE — DISCHARGE INSTRUCTIONS
POST DELIVERY DISCHARGE INSTRUCTIONS    Name: Bo Servin Missed  YOB: 1985  Primary Diagnosis: Active Problems:    Antepartum mild preeclampsia (2021)      Pregnancy (2021)        General:     Diet/Diet Restrictions:  Eight 8-ounce glasses of fluid daily (water, juices); avoid excessive caffeine intake. Meals/snacks as desired which are high in fiber and carbohydrates and low in fat and cholesterol. Medications:   Colace stool softner      Physical Activity / Restrictions / Safety:     Avoid heavy lifting, no more that 8 lbs. For 2-3 weeks; No driving while taking narcotic pain medication. Post  patients should not drive until pain free. No intercourse 4-6 weeks, no douching or tampon use. May resume exercise in 6 weeks. Discharge Instructions/Special Treatment/Home Care Needs:     Continue prenatal vitamins. Continue to use squirt bottle with warm water on your episiotomy after each bathroom use until bleeding stops. If steri-strips applied to your incision, remove in 7 days. Take stool softeners daily. Call your doctor for the following:     Fever over 101 degrees by mouth. Vaginal bleeding heavier than a normal menstrual period or lost larger than a golf ball. Red streaks or increased swelling of legs, painful red streaks on your breast.  Painful urination, or increased pain, redness or discharge with your incision. Pain Management:     Pain Management:   Take Acetaminophen (Tylenol) or Ibuprofen (Advil, Motrin), as directed for pain. Use a warm Sitz bath 3 times daily to relieve episiotomy or hemorrhoidal discomfort. Heating pad to  incision as needed. For hemorrhoidal discomfort, use Tucks and Anusol cream as needed and directed.     Follow-Up Care:     Appointment with MD:   Follow-up Appointments   Procedures    FOLLOW UP VISIT Appointment in: One Week     Standing Status:   Standing     Number of Occurrences:   1     Order Specific Question: Appointment in     Answer:    One Week     Telephone number: [phone]    Signed By: Moise Coulter MD                                                                                                   Date: 6/2/2021 Time: 9:23 AM

## 2021-06-02 NOTE — PROGRESS NOTES
Post-Operative  Day 3    Rosaline Missed         Information for the patient's :  Missed, Female Del Santillan [[id number]]   , Low Transverse    Patient doing well without significant complaint. Tolerating diet, passing flatus, voiding and ambulating without difficulty    Vitals:  Visit Vitals  BP (!) 143/77 Comment: up walking   Pulse 88   Temp 98.4 °F (36.9 °C)   Resp 16   Ht 5' 4\" (1.626 m)   Wt 207 lb (93.9 kg)   LMP 2020 (Exact Date)   SpO2 98%   Breastfeeding Unknown   BMI 35.53 kg/m²     Temp (24hrs), Av.3 °F (36.8 °C), Min:97.8 °F (36.6 °C), Max:98.7 °F (37.1 °C)        Exam:        Patient without distress. Abdomen, bowel sounds present, soft, expected tenderness, fundus firm                Wound incision clean, dry and intact               Lower extremities are negative for swelling, cords or tenderness.     Labs:   Lab Results   Component Value Date/Time    WBC 9.1 2021 02:26 AM    WBC 9.9 2021 08:10 AM    WBC 5.8 2021 03:03 PM    WBC 5.5 2021 11:30 AM    WBC 6.2 2021 12:00 AM    WBC 5.9 2021 01:19 PM    WBC 6.9 2020 01:33 AM    WBC 7.5 2020 01:06 AM    WBC 5.0 2020 12:00 AM    WBC 6.4 10/10/2018 10:31 PM    HGB 8.3 (L) 2021 02:26 AM    HGB 8.5 (L) 2021 08:10 AM    HGB 10.8 (L) 2021 03:03 PM    HGB 10.9 (L) 2021 11:30 AM    HGB 11.5 2021 12:00 AM    HGB 11.4 (L) 2021 01:19 PM    HGB 12.6 2020 01:33 AM    HGB 12.5 2020 01:06 AM    HGB 12.5 2020 12:00 AM    HGB 11.7 10/10/2018 10:31 PM    HCT 26.5 (L) 2021 02:26 AM    HCT 26.5 (L) 2021 08:10 AM    HCT 33.1 (L) 2021 03:03 PM    HCT 33.0 (L) 2021 11:30 AM    HCT 34.6 2021 12:00 AM    HCT 35.3 2021 01:19 PM    HCT 37.4 2020 01:33 AM    HCT 37.9 2020 01:06 AM    HCT 37.5 2020 12:00 AM    HCT 34.8 (L) 10/10/2018 10:31 PM    PLATELET 516  02:26 AM    PLATELET 859 95/29/0985 08:10 AM    PLATELET 991 03/25/3025 03:03 PM    PLATELET 455 35/64/6928 11:30 AM    PLATELET 344 48/26/3856 12:00 AM    PLATELET 682 08/56/3572 01:19 PM    PLATELET 638 32/65/5925 01:33 AM    PLATELET 710 02/35/4031 01:06 AM    PLATELET 795 10/88/4848 12:00 AM    PLATELET 262 75/66/7668 10:31 PM       No results found for this or any previous visit (from the past 24 hour(s)). Assessment: Post-Op day 3, doing well    Plan:   1. Discharge home today  2. Follow up in office in 6 weeks with Trinda Fabry, MD  3.  Post partum activity/wound care advised, diet as tolerated

## 2021-06-11 ENCOUNTER — OFFICE VISIT (OUTPATIENT)
Dept: OBGYN CLINIC | Age: 36
End: 2021-06-11
Payer: MEDICAID

## 2021-06-11 ENCOUNTER — HOSPITAL ENCOUNTER (OUTPATIENT)
Dept: VASCULAR SURGERY | Age: 36
Discharge: HOME | End: 2021-06-11
Attending: OBSTETRICS & GYNECOLOGY
Payer: MEDICAID

## 2021-06-11 DIAGNOSIS — M79.89 SWELLING OF LOWER LEG: ICD-10-CM

## 2021-06-11 DIAGNOSIS — M79.89 SWELLING OF LOWER LEG: Primary | ICD-10-CM

## 2021-06-11 PROCEDURE — 93971 EXTREMITY STUDY: CPT

## 2021-06-11 PROCEDURE — 0502F SUBSEQUENT PRENATAL CARE: CPT | Performed by: OBSTETRICS & GYNECOLOGY

## 2021-06-11 RX ORDER — NIFEDIPINE 30 MG/1
30 TABLET, EXTENDED RELEASE ORAL DAILY
Qty: 90 TABLET | Refills: 4 | Status: SHIPPED | OUTPATIENT
Start: 2021-06-11 | End: 2021-07-23

## 2021-06-11 NOTE — PROGRESS NOTES
Blood Pressure Evaluation  Rosaline Missed is a 43 y.o. female returns for a follow-up visit after undergoing the     following: c/s which was done on 5/30/21 for severe pre-eclampsia    Since the patient's delivery, she has had typical discomfort. She presents today for a blood pressure check. Blood pressure today is 186/102. She has been on no meds since delivery. PHYSICAL EXAMINATION    Gastrointestinal  · Abdominal Examination: abdomen non-tender to palpation, incision/s healing well, normal bowel sounds, no masses present  · Liver and spleen: no hepatomegaly present, spleen not palpable  · Hernias: no hernias identified    Skin  · General Inspection: no rash, no lesions identified    Neurologic/Psychiatric  · Mental Status:  · Orientation: grossly oriented to person, place and time  · Mood and Affect: mood normal, affect appropriate    Assessment/Plan:  H/o pre-elcampsia, bp now elevated. Will start procardia today and rto on Monday for BP check. H/a, blurred vision, sob, cp precautions discussed. LLE edema - LLE doppler scheduled.

## 2021-06-14 ENCOUNTER — OFFICE VISIT (OUTPATIENT)
Dept: OBGYN CLINIC | Age: 36
End: 2021-06-14
Payer: MEDICAID

## 2021-06-14 VITALS — SYSTOLIC BLOOD PRESSURE: 150 MMHG | DIASTOLIC BLOOD PRESSURE: 87 MMHG

## 2021-06-14 PROBLEM — Z34.80 SUPERVISION OF OTHER NORMAL PREGNANCY: Status: RESOLVED | Noted: 2020-11-24 | Resolved: 2021-06-14

## 2021-06-14 PROBLEM — Z34.90 PREGNANCY: Status: RESOLVED | Noted: 2021-05-26 | Resolved: 2021-06-14

## 2021-06-14 PROBLEM — O14.00 ANTEPARTUM MILD PREECLAMPSIA: Status: RESOLVED | Noted: 2021-05-24 | Resolved: 2021-06-14

## 2021-06-14 PROCEDURE — 99212 OFFICE O/P EST SF 10 MIN: CPT | Performed by: OBSTETRICS & GYNECOLOGY

## 2021-06-14 RX ORDER — LABETALOL 200 MG/1
200 TABLET, FILM COATED ORAL 2 TIMES DAILY
Qty: 60 TABLET | Refills: 4 | Status: SHIPPED | OUTPATIENT
Start: 2021-06-14 | End: 2021-07-23

## 2021-06-22 ENCOUNTER — OFFICE VISIT (OUTPATIENT)
Dept: OBGYN CLINIC | Age: 36
End: 2021-06-22
Payer: MEDICAID

## 2021-06-22 VITALS — DIASTOLIC BLOOD PRESSURE: 93 MMHG | WEIGHT: 196.2 LBS | SYSTOLIC BLOOD PRESSURE: 163 MMHG | BODY MASS INDEX: 33.68 KG/M2

## 2021-06-22 PROCEDURE — 99212 OFFICE O/P EST SF 10 MIN: CPT | Performed by: OBSTETRICS & GYNECOLOGY

## 2021-06-22 NOTE — PROGRESS NOTES
BP check    The patient is a 43 y.o.,  No LMP recorded. .     She is here for a blood pressure check. Blood pressure today is 163/93    Past Medical History:   Diagnosis Date    Antepartum mild preeclampsia 2021    Diabetes ([facility] [address]         Past Surgical History:   Procedure Laterality Date    HX APPENDECTOMY      HX LAP CHOLECYSTECTOMY       No Known Allergies    Review of Systems - History obtained from the patient  Constitutional: negative for weight loss, fever, night sweats  HEENT: negative for hearing loss, earache, congestion, snoring, sorethroat  CV: negative for chest pain, palpitations, edema  Resp: negative for cough, shortness of breath, wheezing  Breast: negative for breast lumps, nipple discharge, galactorrhea  GI: negative for change in bowel habits, abdominal pain, black or bloody stools  : negative for frequency, dysuria, hematuria  MSK: negative for back pain, joint pain, muscle pain  Skin: negative for itching, rash, hives  Neuro: negative for dizziness, headache, confusion, weakness  Psych: negative for anxiety, depression, change in mood  Heme/lymph: negative for bleeding, bruising, pallor    Assessment/Plan:   Elevated BP with Pre-elcampsia, BP severe range today, however pt reports it has been normal every other day (120/50's) and she forgot to take her medicine today. She will take her medicine immediately when she gets home and continue to monitor. If above 140/90 she will notify and I will increase her BP medication over the phone/Mychart. Strict PIH precautions discussed. RTO in 3 weeks for pp exam.  Instructions given to pt. Handouts given to pt.

## 2021-07-23 ENCOUNTER — OFFICE VISIT (OUTPATIENT)
Dept: OBGYN CLINIC | Age: 36
End: 2021-07-23
Payer: MEDICAID

## 2021-07-23 VITALS — SYSTOLIC BLOOD PRESSURE: 157 MMHG | DIASTOLIC BLOOD PRESSURE: 90 MMHG | WEIGHT: 201 LBS | BODY MASS INDEX: 34.5 KG/M2

## 2021-07-23 DIAGNOSIS — Z30.430 ENCOUNTER FOR IUD INSERTION: ICD-10-CM

## 2021-07-23 PROCEDURE — 58300 INSERT INTRAUTERINE DEVICE: CPT | Performed by: OBSTETRICS & GYNECOLOGY

## 2021-07-23 PROCEDURE — 99024 POSTOP FOLLOW-UP VISIT: CPT | Performed by: OBSTETRICS & GYNECOLOGY

## 2021-07-27 LAB
CYTOLOGIST CVX/VAG CYTO: NORMAL
CYTOLOGY CVX/VAG DOC CYTO: NORMAL
CYTOLOGY CVX/VAG DOC THIN PREP: NORMAL
CYTOLOGY HISTORY:: NORMAL
DX ICD CODE: NORMAL
HPV I/H RISK 4 DNA CVX QL PROBE+SIG AMP: NEGATIVE
Lab: NORMAL
OTHER STN SPEC: NORMAL
STAT OF ADQ CVX/VAG CYTO-IMP: NORMAL

## 2021-08-27 ENCOUNTER — OFFICE VISIT (OUTPATIENT)
Dept: OBGYN CLINIC | Age: 36
End: 2021-08-27
Payer: MEDICAID

## 2021-08-27 VITALS — DIASTOLIC BLOOD PRESSURE: 90 MMHG | SYSTOLIC BLOOD PRESSURE: 140 MMHG | WEIGHT: 199 LBS | BODY MASS INDEX: 34.16 KG/M2

## 2021-08-27 DIAGNOSIS — Z30.431 IUD CHECK UP: ICD-10-CM

## 2021-08-27 DIAGNOSIS — Z86.32 HISTORY OF GESTATIONAL DIABETES: Primary | ICD-10-CM

## 2021-08-27 PROCEDURE — 99212 OFFICE O/P EST SF 10 MIN: CPT | Performed by: OBSTETRICS & GYNECOLOGY

## 2021-08-27 NOTE — PROGRESS NOTES
IUD followup note    This is a follow-up visit for [facility] Missed is a ,  43 y.o. female OTHER No LMP recorded. .     She had an Mirena IUD placed five weeks ago. Since the IUD placement, the patient has not had any unusual complaints. She has had some mild non-menstrual bleeding. She describes having a spotting amount of blood-tinged discharge which has occurred off and on since insertion of the Mirena. She has not had any pelvic pain. She has had no fever. Associated signs and symptoms: she denies dyspareunia, expulsion, heavy bleeding, increased pain, fever, and pelvic pain. Ultrasound was not done today. Past Medical History:   Diagnosis Date    Antepartum mild preeclampsia 2021    Diabetes [facility])      Past Surgical History:   Procedure Laterality Date    HX APPENDECTOMY      HX LAP CHOLECYSTECTOMY       Social History     Occupational History    Not on file   Tobacco Use    Smoking status: Never Smoker    Smokeless tobacco: Never Used   Vaping Use    Vaping Use: Never used   Substance and Sexual Activity    Alcohol use: Never    Drug use: Never    Sexual activity: Yes     Partners: Male     Family History   Problem Relation Age of Onset    Hypertension Mother        No Known Allergies  Prior to Admission medications    Medication Sig Start Date End Date Taking? Authorizing Provider   glucose blood VI test strips (True Metrix Glucose Test Strip) strip Use to check blood sugars eight times daily. Dx. Code E11.65 21   Bacilio Esquivel MD   polyethylene glycol (MIRALAX) 17 gram/dose powder Take 17 g by mouth daily. Patient not taking: Reported on 2021   Opal Brennan MD   Blood-Glucose Meter (True Metrix Glucose Meter) misc Use to check blood sugars eight times daily. Dx. Code E11.65 21   Bacilio Esquivel MD   lancets misc Use to check blood sugars eight times daily.  Dx. Code E11.65 21   Bacilio Esquivel MD   Blood-Glucose Meter monitoring kit Please dispense insurance preferred meter Use PRN to check blood sugars 1/12/21   Davy Bates MD   lancets misc Use 4 times daily to check blood sugar please dispense insurance preferred 1/12/21   Davy Bates MD   glucose blood VI test strips (ASCENSIA AUTODISC VI, ONE TOUCH ULTRA TEST VI) strip Use 4 times daily to check blood sugar please dispense insurance preferred 1/12/21   Davy Bates MD   -veaw-CK-om-3s-dha-epa (Vitafol Gummies) 3.33 mg iron- 0.33 mg chew Take 1 Tab by mouth daily. Patient not taking: Reported on 5/24/2021 11/16/20   Davy Bates MD   doxylamine-pyridoxine, vit B6, (Diclegis) 10-10 mg TbEC DR tablet Take 2 tablets by mouth nightly. May add 1 tab in the morning and 1 tab in the afternoon. 4 tabs max daily.  #120 tabs for 30 days  Patient not taking: Reported on 5/24/2021 11/16/20   Davy Bates MD        Review of Systems: History obtained from the patient  Constitutional: negative for weight loss, fever, night sweats  Breast: negative for breast lumps, nipple discharge, galactorrhea  GI: negative for change in bowel habits, abdominal pain, black or bloody stools  : negative for frequency, dysuria, hematuria, vaginal discharge  MSK: negative for back pain, joint pain, muscle pain  Skin: negative for itching, rash, hives  Psych: negative for anxiety, depression, change in mood      Objective:  Visit Vitals  BP (!) 140/90   Wt 199 lb (90.3 kg)   BMI 34.16 kg/m²       Physical Exam:   PHYSICAL EXAMINATION    Constitutional  · Appearance: well-nourished, well developed, alert, in no acute distress    Gastrointestinal  · Abdominal Examination: abdomen non-tender to palpation, normal bowel sounds, no masses present  · Liver and spleen: no hepatomegaly present, spleen not palpable  · Hernias: no hernias identified    Genitourinary  · External Genitalia: normal appearance for age, no discharge present, no tenderness present, no inflammatory lesions present, no masses present, no atrophy present  · Vagina: normal vaginal vault without central or paravaginal defects, no discharge present, no inflammatory lesions present, no masses present  · Bladder: non-tender to palpation  · Urethra: appears normal  · Cervix: normal with IUD string visible and appropriate length   · Uterus: normal size, shape and consistency  · Adnexa: no adnexal tenderness present, no adnexal masses present  · Perineum: perineum within normal limits, no evidence of trauma, no rashes or skin lesions present    Skin  · General Inspection: no rash, no lesions identified    Neurologic/Psychiatric  · Mental Status:  · Orientation: grossly oriented to person, place and time  · Mood and Affect: mood normal, affect appropriate    Assessment:  Doing well with expected mild irregular bleeding. Plan:   RTO for AE as scheduled.

## 2021-08-28 LAB
GLUCOSE 2H P MEAL SERPL-MCNC: 420 MG/DL (ref 65–139)
GLUCOSE P FAST SERPL-MCNC: 200 MG/DL (ref 65–99)

## 2022-10-31 ENCOUNTER — OFFICE VISIT (OUTPATIENT)
Dept: OBGYN CLINIC | Age: 37
End: 2022-10-31
Payer: MEDICAID

## 2022-10-31 VITALS
BODY MASS INDEX: 31.41 KG/M2 | SYSTOLIC BLOOD PRESSURE: 116 MMHG | HEIGHT: 64 IN | WEIGHT: 184 LBS | DIASTOLIC BLOOD PRESSURE: 72 MMHG

## 2022-10-31 DIAGNOSIS — Z01.419 WELL FEMALE EXAM WITH ROUTINE GYNECOLOGICAL EXAM: Primary | ICD-10-CM

## 2022-10-31 DIAGNOSIS — N89.8 VAGINAL ITCHING: ICD-10-CM

## 2022-10-31 PROCEDURE — 99395 PREV VISIT EST AGE 18-39: CPT | Performed by: OBSTETRICS & GYNECOLOGY

## 2022-10-31 RX ORDER — FLUCONAZOLE 150 MG/1
150 TABLET ORAL DAILY
Qty: 3 TABLET | Refills: 0 | Status: SHIPPED | OUTPATIENT
Start: 2022-10-31 | End: 2022-11-03

## 2022-10-31 RX ORDER — NYSTATIN AND TRIAMCINOLONE ACETONIDE 100000; 1 [USP'U]/G; MG/G
OINTMENT TOPICAL 2 TIMES DAILY
Qty: 30 G | Refills: 0 | Status: SHIPPED | OUTPATIENT
Start: 2022-10-31 | End: 2022-11-07

## 2022-10-31 NOTE — PROGRESS NOTES
Rosaline Missed is a 42 y.o. female returns for an annual exam     Chief Complaint   Patient presents with    Well Woman    Vaginitis       No LMP recorded. (Menstrual status: IUD). Problems: significant vaginal itching    Birth Control: IUD. Arnulfo Moyer Last Pap: normal NO ECC obtained 1 year(s) ago. 1. Have you been to the ER, urgent care clinic, or hospitalized since your last visit? No    2. Have you seen or consulted any other health care providers outside of the [address] [place] [address] since your last visit? No    Examination chaperoned by [employer].     [employer], [facility] [place]

## 2022-10-31 NOTE — PROGRESS NOTES
Annual exam  Rosaline Missed is a ,  42 y.o. female   No LMP recorded. (Menstrual status: IUD). She presents for her annual checkup. She is having  vaginal irritation . Menstrual status:    Her periods are absent with IUD    She does not have dysmenorrhea. She reports no premenstrual symptoms. Sexual history:    She  reports being sexually active and has had partner(s) who are male. Medical conditions:    Since her last annual GYN exam about one year ago, she has not the following changes in her health history: none. Per Nursing Note:  No LMP recorded. (Menstrual status: IUD). Problems: significant vaginal itching   Birth Control: IUD. Pinky Angles Last Pap: normal NO ECC obtained 1 year(s) ago. Past Medical History:   Diagnosis Date    Antepartum mild preeclampsia 2021    Diabetes ([facility] [address]      Past Surgical History:   Procedure Laterality Date    HX APPENDECTOMY      HX LAP CHOLECYSTECTOMY         Current Outpatient Medications   Medication Sig Dispense Refill    glucose blood VI test strips (True Metrix Glucose Test Strip) strip Use to check blood sugars eight times daily. Dx. Code E11.65 (Patient not taking: Reported on 10/31/2022) 250 Strip 6    polyethylene glycol (MIRALAX) 17 gram/dose powder Take 17 g by mouth daily. (Patient not taking: No sig reported) 510 g 0    Blood-Glucose Meter (True Metrix Glucose Meter) misc Use to check blood sugars eight times daily. Dx. Code E11.65 (Patient not taking: Reported on 10/31/2022) 1 Each 0    lancets misc Use to check blood sugars eight times daily.  Dx. Code E11.65 (Patient not taking: Reported on 10/31/2022) 250 Each 6    Blood-Glucose Meter monitoring kit Please dispense insurance preferred meter Use PRN to check blood sugars (Patient not taking: Reported on 10/31/2022) 1 Kit 0    lancets misc Use 4 times daily to check blood sugar please dispense insurance preferred (Patient not taking: Reported on 10/31/2022) 1 Each 11    glucose blood VI test strips (ASCENSIA AUTODISC VI, ONE TOUCH ULTRA TEST VI) strip Use 4 times daily to check blood sugar please dispense insurance preferred (Patient not taking: Reported on 10/31/2022) 120 Strip 11    -iron-FA-om-3s-dha-epa (Vitafol Gummies) 3.33 mg iron- 0.33 mg chew Take 1 Tab by mouth daily. (Patient not taking: No sig reported) 90 Tab 4    doxylamine-pyridoxine, vit B6, (Diclegis) 10-10 mg TbEC DR tablet Take 2 tablets by mouth nightly. May add 1 tab in the morning and 1 tab in the afternoon. 4 tabs max daily. #120 tabs for 30 days (Patient not taking: No sig reported) 120 Tab 11     Allergies: Patient has no known allergies. Tobacco History:  reports that she has never smoked. She has never used smokeless tobacco.  Alcohol Abuse:  reports no history of alcohol use. Drug Abuse:  reports no history of drug use.     Family Medical/Cancer History:   Family History   Problem Relation Age of Onset    Hypertension Mother         Review of Systems - History obtained from the patient  Constitutional: negative for weight loss, fever, night sweats  HEENT: negative for hearing loss, earache, congestion, snoring, sorethroat  CV: negative for chest pain, palpitations, edema  Resp: negative for cough, shortness of breath, wheezing  GI: negative for change in bowel habits, abdominal pain, black or bloody stools  : negative for frequency, dysuria, hematuria, vaginal discharge  MSK: negative for back pain, joint pain, muscle pain  Breast: negative for breast lumps, nipple discharge, galactorrhea  Skin :negative for itching, rash, hives  Neuro: negative for dizziness, headache, confusion, weakness  Psych: negative for anxiety, depression, change in mood  Heme/lymph: negative for bleeding, bruising, pallor    Physical Exam    Visit Vitals  /72   Ht 5' 4\" (1.626 m)   Wt 184 lb (83.5 kg)   BMI 31.58 kg/m²       Constitutional  Appearance: well-nourished, well developed, alert, in no acute distress    HENT  Head and Face: appears normal    Neck  Inspection/Palpation: normal appearance, no masses or tenderness  Lymph Nodes: no lymphadenopathy present  Thyroid: gland size normal, nontender, no nodules or masses present on palpation    Chest  Respiratory Effort: breathing unlabored    Breasts  Inspection of Breasts: breasts symmetrical, no skin changes, no discharge present, nipple appearance normal, no skin retraction present  Palpation of Breasts and Axillae: no masses present on palpation, no breast tenderness  Axillary Lymph Nodes: no lymphadenopathy present    Gastrointestinal  Abdominal Examination: abdomen non-tender to palpation, normal bowel sounds, no masses present  Liver and spleen: no hepatomegaly present, spleen not palpable  Hernias: no hernias identified    Genitourinary  External Genitalia: normal appearance for age, no discharge present, no tenderness present, no inflammatory lesions present, no masses present, no atrophy present  Vagina: normal vaginal vault without central or paravaginal defects, no discharge present, no inflammatory lesions present, no masses present  Bladder: non-tender to palpation  Urethra: appears normal  Cervix: normal, IUD strings appropriate length   Uterus: normal size, shape and consistency  Adnexa: no adnexal tenderness present, no adnexal masses present  Perineum: perineum within normal limits, no evidence of trauma, no rashes or skin lesions present  Anus: anus within normal limits, no hemorrhoids present  Inguinal Lymph Nodes: no lymphadenopathy present    Skin  General Inspection: no rash, no lesions identified    Neurologic/Psychiatric  Mental Status:  Orientation: grossly oriented to person, place and time  Mood and Affect: mood normal, affect appropriate    Assessment:  Routine gynecologic examination  Her current medical status is satisfactory with no evidence of significant gynecologic issues.   Vaginal irritation likely yeast we will treat with Diflucan and Mycolog will follow up with nuab    Plan:  Counseled re: diet, exercise, healthy lifestyle  Return for yearly wellness visits  Pt counseled regarding co-testing for high risk HPV with pap  Rec screening mammo at either 33 or 38

## 2022-11-01 ENCOUNTER — TELEPHONE (OUTPATIENT)
Dept: OBGYN CLINIC | Age: 37
End: 2022-11-01

## 2022-11-01 RX ORDER — NYSTATIN 100000 U/G
OINTMENT TOPICAL
Qty: 30 G | Refills: 0 | Status: SHIPPED | OUTPATIENT
Start: 2022-11-01

## 2022-11-01 RX ORDER — TRIAMCINOLONE ACETONIDE 1 MG/G
OINTMENT TOPICAL
Qty: 30 G | Refills: 0 | Status: SHIPPED | OUTPATIENT
Start: 2022-11-01

## 2022-11-01 NOTE — TELEPHONE ENCOUNTER
----- Message from Fabrizio Leon sent at 11/1/2022  2:34 PM EDT -----  Regarding: medication  Nystatin-Triamcinolone

## 2022-11-03 LAB
A VAGINAE DNA VAG QL NAA+PROBE: ABNORMAL SCORE
BVAB2 DNA VAG QL NAA+PROBE: ABNORMAL SCORE
C ALBICANS DNA VAG QL NAA+PROBE: POSITIVE
C GLABRATA DNA VAG QL NAA+PROBE: NEGATIVE
MEGA1 DNA VAG QL NAA+PROBE: ABNORMAL SCORE

## 2023-06-26 NOTE — Clinical Note
Thank you for allowing us to provide you with medical care today. We realize that you have many choices for your emergency care needs. We thank you for choosing [facility]. Please choose us in the future for any continued health care needs. We hope we addressed all of your medical concerns. We strive to provide excellent quality care in the Emergency Department. Anything less than excellent does not meet our expectations. The exam and treatment you received in the Emergency De partment were for an emergent problem and are not intended as complete care. It is important that you follow up with a doctor, nurse practitioner, or physician's assistant for ongoing care. If your symptoms worsen or you do not improve as expected an d you are unable to reach your usual health care provider, you should return to the Emergency Department. We are available 24 hours a day. Take this sheet with you when you go to your follow-up visit. If you have any problem arranging the fol low-up visit, contact the Emergency Department immediately. Make an appointment your family doctor for follow up of this visit. Return to the ER if you are unable to be seen in a timely manner. Cosentyx Counseling:  I discussed with the patient the risks of Cosentyx including but not limited to worsening of Crohn's disease, immunosuppression, allergic reactions and infections.  The patient understands that monitoring is required including a PPD at baseline and must alert us or the primary physician if symptoms of infection or other concerning signs are noted.

## 2024-06-19 NOTE — PROGRESS NOTES
LOV: 6/5/24 with Dr. Hodges  NOV: 7/18/24 with Lanette LE   Postpartum evaluation    Rosaline Missed is a 43 y.o. female who presents for a postpartum exam.     She is now six weeks post primary  section. Her baby is doing well. She has had no menses since delivery. She has had the following significant problems since her delivery: none    The patient is bottle feeding without difficulty. The patient would like to use IUD for birth control. She is currently taking: no medications. She is due for her next AE in 12 months.      Visit Vitals  BP (!) 157/90   Wt 201 lb (91.2 kg)   BMI 34.50 kg/m²       PHYSICAL EXAMINATION    Constitutional  · Appearance: well-nourished, well developed, alert, in no acute distress    HENT  · Head and Face: appears normal    Neck  · Inspection/Palpation: normal appearance, no masses or tenderness  · Lymph Nodes: no lymphadenopathy present  · Thyroid: gland size normal, nontender, no nodules or masses present on palpation    Breasts  · Inspection of Breasts: breasts symmetrical, no skin changes, no discharge present, nipple appearance normal, no skin retraction present  · Palpation of Breasts and Axillae: no masses present on palpation, no breast tenderness  · Axillary Lymph Nodes: no lymphadenopathy present    Gastrointestinal  · Abdominal Examination: abdomen non-tender to palpation, normal bowel sounds, no masses present  · Liver and spleen: no hepatomegaly present, spleen not palpable  · Hernias: no hernias identified    Genitourinary  · External Genitalia: normal appearance for age, no discharge present, no tenderness present, no inflammatory lesions present, no masses present, no atrophy present  · Vagina: normal vaginal vault without central or paravaginal defects, no discharge present, no inflammatory lesions present, no masses present  · Bladder: non-tender to palpation  · Urethra: appears normal  · Cervix: normal   · Uterus: normal size, shape and consistency  · Adnexa: no adnexal tenderness present, no adnexal masses present  · Perineum: perineum within normal limits, no evidence of trauma, no rashes or skin lesions present  · Anus: anus within normal limits, no hemorrhoids present  · Inguinal Lymph Nodes: no lymphadenopathy present    Skin  · General Inspection: no rash, no lesions identified    Neurologic/Psychiatric  · Mental Status:  · Orientation: grossly oriented to person, place and time  · Mood and Affect: mood normal, affect appropriate    Assessment:  Normal postpartum check    Plan:  RTO for AE. Rx for contraception:     [facility]  OFFICE PROCEDURE PROGRESS NOTE        Chart reviewed for the following:   Angélica JONAS, have reviewed the History, Physical and updated the Allergic reactions for [facility] Missed     TIME OUT performed immediately prior to start of procedure:   Angélica JONAS, have performed the following reviews on [facility] Missed prior to the start of the procedure:            * Patient was identified by name and date of birth   * Agreement on procedure being performed was verified  * Risks and Benefits explained to the patient  * Procedure site verified and marked as necessary  * Patient was positioned for comfort  * Consent was signed and verified     Time: 1:10pm      Date of procedure: 2021    Procedure performed by:  Annie Soares MD    Provider assisted by: Angélica Whittington MA    Patient assisted by: self    How tolerated by patient: tolerated the procedure well with no complications    Post Procedural Pain Scale: 0 - No Hurt    Comments: none      -----------------------------------IUD INSERTION----------------------------------------- Indications:  Rosaline Grady is a ,  43 y.o. female OTHER whose No LMP recorded. was on  and was normal in duration and amount of flow. who presents for insertion of an IUD. The risks, benefits and alternatives of IUD insertion were discussed in detail at last visit. She also has reviewed Mirena information.  She has elected to proceed with the insertion today and she states she has no further questions. A urine pregnancy test was negative No components found for: SPEP, UPEP    Procedure: The pelvic exam revealed normal external genitalia. On bimanual exam the uterus was anteverted and normal in size with no tenderness present. A speculum was inserted into the vagina and the cervix was visualized. The cervix was prepped with zephiran solution. The anterior lip of the cervix was grasped with a single toothed tenaculum. The uterus was sounded with a Camara sound to 7 centimeters. A Mirena was then inserted without difficulty. The string was cut to 3 centimeters. She experienced a mild  amount of cramping. Post Procedure Status: She tolerated the procedure with mild discomfort. The patient was observed for 15 minutes after the insertion. There were no complications. Patient was discharged in stable condition.   The patient received Mirena lot number [id number] (OFFICE SUPPLIED)

## 2025-05-20 ENCOUNTER — HOSPITAL ENCOUNTER (EMERGENCY)
Facility: HOSPITAL | Age: 40
Discharge: HOME | End: 2025-05-20
Attending: EMERGENCY MEDICINE
Payer: COMMERCIAL

## 2025-05-20 VITALS
SYSTOLIC BLOOD PRESSURE: 135 MMHG | DIASTOLIC BLOOD PRESSURE: 80 MMHG | HEART RATE: 82 BPM | RESPIRATION RATE: 20 BRPM | HEIGHT: 64 IN | OXYGEN SATURATION: 99 % | BODY MASS INDEX: 30.49 KG/M2 | WEIGHT: 178.57 LBS | TEMPERATURE: 98.1 F

## 2025-05-20 DIAGNOSIS — T78.40XA ACUTE ALLERGIC REACTION, INITIAL ENCOUNTER: Primary | ICD-10-CM

## 2025-05-20 LAB
ANION GAP SERPL CALC-SCNC: 3 MMOL/L (ref 2–12)
BASOPHILS # BLD: 0.02 K/UL (ref 0–0.1)
BASOPHILS NFR BLD: 0.4 % (ref 0–1)
BUN SERPL-MCNC: 12 MG/DL (ref 6–20)
BUN/CREAT SERPL: 20 (ref 12–20)
CALCIUM SERPL-MCNC: 9.1 MG/DL (ref 8.5–10.1)
CHLORIDE SERPL-SCNC: 110 MMOL/L (ref 97–108)
CO2 SERPL-SCNC: 26 MMOL/L (ref 21–32)
CREAT SERPL-MCNC: 0.6 MG/DL (ref 0.55–1.02)
DIFFERENTIAL METHOD BLD: NORMAL
EOSINOPHIL # BLD: 0.05 K/UL (ref 0–0.4)
EOSINOPHIL NFR BLD: 1.1 % (ref 0–7)
ERYTHROCYTE [DISTWIDTH] IN BLOOD BY AUTOMATED COUNT: 12.1 % (ref 11.5–14.5)
GLUCOSE SERPL-MCNC: 150 MG/DL (ref 65–100)
HCT VFR BLD AUTO: 38.9 % (ref 35–47)
HGB BLD-MCNC: 13.2 G/DL (ref 11.5–16)
IMM GRANULOCYTES # BLD AUTO: 0.01 K/UL (ref 0–0.04)
IMM GRANULOCYTES NFR BLD AUTO: 0.2 % (ref 0–0.5)
LYMPHOCYTES # BLD: 1.32 K/UL (ref 0.8–3.5)
LYMPHOCYTES NFR BLD: 28.5 % (ref 12–49)
MCH RBC QN AUTO: 27.8 PG (ref 26–34)
MCHC RBC AUTO-ENTMCNC: 33.9 G/DL (ref 30–36.5)
MCV RBC AUTO: 81.9 FL (ref 80–99)
MONOCYTES # BLD: 0.25 K/UL (ref 0–1)
MONOCYTES NFR BLD: 5.4 % (ref 5–13)
NEUTS SEG # BLD: 2.98 K/UL (ref 1.8–8)
NEUTS SEG NFR BLD: 64.4 % (ref 32–75)
NRBC # BLD: 0 K/UL (ref 0–0.01)
NRBC BLD-RTO: 0 PER 100 WBC
PLATELET # BLD AUTO: 237 K/UL (ref 150–400)
PMV BLD AUTO: 10.2 FL (ref 8.9–12.9)
POTASSIUM SERPL-SCNC: 4 MMOL/L (ref 3.5–5.1)
RBC # BLD AUTO: 4.75 M/UL (ref 3.8–5.2)
SODIUM SERPL-SCNC: 139 MMOL/L (ref 136–145)
WBC # BLD AUTO: 4.6 K/UL (ref 3.6–11)

## 2025-05-20 PROCEDURE — 2580000003 HC RX 258: Performed by: EMERGENCY MEDICINE

## 2025-05-20 PROCEDURE — 96375 TX/PRO/DX INJ NEW DRUG ADDON: CPT

## 2025-05-20 PROCEDURE — 80048 BASIC METABOLIC PNL TOTAL CA: CPT

## 2025-05-20 PROCEDURE — 6370000000 HC RX 637 (ALT 250 FOR IP): Performed by: EMERGENCY MEDICINE

## 2025-05-20 PROCEDURE — 99284 EMERGENCY DEPT VISIT MOD MDM: CPT

## 2025-05-20 PROCEDURE — 96374 THER/PROPH/DIAG INJ IV PUSH: CPT

## 2025-05-20 PROCEDURE — 85025 COMPLETE CBC W/AUTO DIFF WBC: CPT

## 2025-05-20 PROCEDURE — 6360000002 HC RX W HCPCS: Performed by: EMERGENCY MEDICINE

## 2025-05-20 RX ORDER — 0.9 % SODIUM CHLORIDE 0.9 %
1000 INTRAVENOUS SOLUTION INTRAVENOUS ONCE
Status: COMPLETED | OUTPATIENT
Start: 2025-05-20 | End: 2025-05-20

## 2025-05-20 RX ORDER — CETIRIZINE HYDROCHLORIDE 10 MG/1
20 TABLET ORAL ONCE
Status: COMPLETED | OUTPATIENT
Start: 2025-05-20 | End: 2025-05-20

## 2025-05-20 RX ORDER — FAMOTIDINE 20 MG/1
20 TABLET, FILM COATED ORAL 2 TIMES DAILY PRN
Qty: 10 TABLET | Refills: 0 | Status: SHIPPED | OUTPATIENT
Start: 2025-05-20 | End: 2025-05-25

## 2025-05-20 RX ORDER — CETIRIZINE HYDROCHLORIDE 10 MG/1
10 TABLET ORAL EVERY 12 HOURS PRN
Qty: 10 TABLET | Refills: 0 | Status: SHIPPED | OUTPATIENT
Start: 2025-05-20 | End: 2025-05-25

## 2025-05-20 RX ORDER — PREDNISONE 20 MG/1
60 TABLET ORAL DAILY
Qty: 15 TABLET | Refills: 0 | Status: SHIPPED | OUTPATIENT
Start: 2025-05-20 | End: 2025-05-25

## 2025-05-20 RX ORDER — DEXAMETHASONE SODIUM PHOSPHATE 10 MG/ML
10 INJECTION, SOLUTION INTRAMUSCULAR; INTRAVENOUS ONCE
Status: COMPLETED | OUTPATIENT
Start: 2025-05-20 | End: 2025-05-20

## 2025-05-20 RX ADMIN — SODIUM CHLORIDE 1000 ML: 0.9 INJECTION, SOLUTION INTRAVENOUS at 00:38

## 2025-05-20 RX ADMIN — CETIRIZINE HYDROCHLORIDE 20 MG: 10 TABLET, FILM COATED ORAL at 00:39

## 2025-05-20 RX ADMIN — FAMOTIDINE 20 MG: 10 INJECTION, SOLUTION INTRAVENOUS at 00:39

## 2025-05-20 RX ADMIN — DEXAMETHASONE SODIUM PHOSPHATE 10 MG: 10 INJECTION, SOLUTION INTRAMUSCULAR; INTRAVENOUS at 00:40

## 2025-05-20 ASSESSMENT — PAIN - FUNCTIONAL ASSESSMENT: PAIN_FUNCTIONAL_ASSESSMENT: 0-10

## 2025-05-20 ASSESSMENT — PAIN DESCRIPTION - DESCRIPTORS: DESCRIPTORS: TIGHTNESS

## 2025-05-20 ASSESSMENT — PAIN SCALES - GENERAL: PAINLEVEL_OUTOF10: 7

## 2025-05-20 ASSESSMENT — PAIN DESCRIPTION - LOCATION: LOCATION: UMBILICUS

## 2025-05-20 NOTE — ED TRIAGE NOTES
Pt reports being in the garden yesterday morning around 11am , endorses generalized hives and itchy that started when she got inside from gardening   Also reports SOB, throat feels itchy and difficulty swallowing     Denies any new medication, lotion, food or detergent  Pt took benadryl 25 mg at 5pm  yesterday

## 2025-05-20 NOTE — DISCHARGE INSTRUCTIONS
You have been evaluated in the Emergency Department today for a possible allergic reaction.  You have been given medications to control your symptoms. You have been observed in the emergency Department and you are stable for discharge at this time.    You can take antihistamines, both Type 1 (Benadryl, Zyrtec, Claritin, or hydroxyzine) and Type 2 (Pepcid or Zantac), to help control your symptoms at home.  You have also been given a prescription for steroids, please take them as directed.    Please schedule an appointment with your primary care physician for follow up.    Return to the Emergency Department if you experience rashes, difficulty breathing or swallowing, lip/mouth/tongue swelling, vomiting, or for any other concerning symptoms.    Thank you for choosing us for your care.

## 2025-05-20 NOTE — ED PROVIDER NOTES
[facility] EMERGENCY DEPARTMENT  EMERGENCY DEPARTMENT ENCOUNTER      Pt Name: Esperanza Max  MRN: [id number]  Birthdate 1985  Date of evaluation: 5/20/2025  Provider: Shin Jones MD    CHIEF COMPLAINT       Chief Complaint   Patient presents with    Allergic Reaction       ALLERGIES     Patient has no known allergies.    ENCOUNTER     HISTORY OF PRESENT ILLNESS    A 40-year-old female walked into the Emergency Department, providing her own history. She presents with an allergic reaction characterized by hives, pruritus, severe itching, swollen eyes, severe pain in fingers, shortness of breath, throat itchiness, and difficulty swallowing. These symptoms began after gardening at 11 AM yesterday. She attempted to alleviate her symptoms with Benadryl at 5:00 PM and ibuprofen 800 mg, but her symptoms persisted.    PHYSICAL EXAM    Vitals: Interpreted as normal for this patient.    General: NAD. Well-kept female adult.    Eyes: Swollen, no scleral icterus.    HENT: Atraumatic. Moist mucous membranes, no oropharyngeal edema or erythema, no uvular swelling or deviation. Tolerant of secretions. Conversant without difficulty.    Neck: Atraumatic, supple.    Cardiac: Regular rate, regular rhythm, no significant murmurs appreciated. 2+ pulses.    Respiratory: No respiratory distress, clear lungs bilaterally with no adventitious lung sounds.    Abdomen: Soft. Nontender. Nondistended. No rebound. No guarding. No tenderness over McBurney's point, no tenderness over the liver or spleen, no Caballero's sign, no pulsatile abdominal mass.    : No CVAT.    MS: Extremities atraumatic. Severe pain in fingers. No edema, no calf tenderness to palpation.    Skin: Hives and excoriations on left arm. No cyanosis, no diaphoresis.    Back exam: Atraumatic.    Neurologic: No altered mental status, speech is fluent. Gait is within normal limits. No cerebellar deficits. No motor deficits. No sensory deficits.

## (undated) DEVICE — TOWEL,OR,DSP,ST,BLUE,STD,2/PK,40PK/CS: Brand: MEDLINE

## (undated) DEVICE — TROCAR: Brand: KII FIOS FIRST ENTRY

## (undated) DEVICE — GOWN,SIRUS,NONRNF,SETINSLV,2XL,18/CS: Brand: MEDLINE

## (undated) DEVICE — STERILE POLYISOPRENE POWDER-FREE SURGICAL GLOVES: Brand: PROTEXIS

## (undated) DEVICE — BLADE ASSEMB CLP HAIR FINE --

## (undated) DEVICE — ELECTRODE ES 36CM LAP FLAT L HK COAT DISP CLEANCOAT

## (undated) DEVICE — GOWN,AURORA,FABRIC-REINFORCED,X-LARGE: Brand: MEDLINE

## (undated) DEVICE — SUTURE VCRL SZ 0 L36IN ABSRB VLT L40MM CT 1/2 CIR J358H

## (undated) DEVICE — GARMENT,MEDLINE,DVT,INT,CALF,MED, GEN2: Brand: MEDLINE

## (undated) DEVICE — SOLIDIFIER FLUID 3000 CC ABSORB

## (undated) DEVICE — 3000CC GUARDIAN II: Brand: GUARDIAN

## (undated) DEVICE — C-SECTION II-LF: Brand: MEDLINE INDUSTRIES, INC.

## (undated) DEVICE — DRAPE XR C ARM 41X74IN LF --

## (undated) DEVICE — KIT CHOLGM POLYUR W/ KARLAN BLLN CATH 4FR L60CM 5MM INTRO

## (undated) DEVICE — DECANTER BAG 9": Brand: MEDLINE INDUSTRIES, INC.

## (undated) DEVICE — SUTURE MCRYL 2 0 L27IN ABSRB VLT CT MFIL Y351H

## (undated) DEVICE — Device

## (undated) DEVICE — SOL INJ SOD CL 0.9% 500ML BG --

## (undated) DEVICE — SUTURE MCRYL SZ 4 0 L18IN ABSRB VLT PS 1 L24MM 3 8 CIR REV Y682H

## (undated) DEVICE — MASTISOL ADHESIVE LIQ 2/3ML

## (undated) DEVICE — STRIP,CLOSURE,WOUND,MEDI-STRIP,1/2X4: Brand: MEDLINE

## (undated) DEVICE — TROCAR: Brand: KII® SLEEVE

## (undated) DEVICE — LAPAROSCOPIC TROCAR SLEEVE/SINGLE USE: Brand: KII® OPTICAL ACCESS SYSTEM

## (undated) DEVICE — SUTURE MCRYL SZ 0 L36IN ABSRB UD L36MM CT-1 1/2 CIR Y946H

## (undated) DEVICE — TISSUE RETRIEVAL SYSTEM: Brand: INZII RETRIEVAL SYSTEM

## (undated) DEVICE — TIP CLEANER: Brand: VALLEYLAB

## (undated) DEVICE — SPONGE: LAP 18X18 W  200/CS: Brand: MEDICAL ACTION INDUSTRIES

## (undated) DEVICE — PREP SKN CHLRAPRP APL 26ML STR --

## (undated) DEVICE — APPLIER CLP M L L11.4IN DIA10MM ENDOSCP ROT MULT FOR LIG

## (undated) DEVICE — SUTURE PDS II SZ 0 L60IN ABSRB VLT L48MM CTX 1/2 CIR Z990G

## (undated) DEVICE — STRAP,POSITIONING,KNEE/BODY,FOAM,4X60": Brand: MEDLINE

## (undated) DEVICE — SYR 10ML LUER LOK 1/5ML GRAD --

## (undated) DEVICE — Z DISCONTINUED NO SUB IDED SET EXTN W/ 4 W STPCOCK M SPIN LOK 36IN

## (undated) DEVICE — NEEDLE HYPO 22GA L1.5IN BLK S STL HUB POLYPR SHLD REG BVL

## (undated) DEVICE — REM POLYHESIVE ADULT PATIENT RETURN ELECTRODE: Brand: VALLEYLAB

## (undated) DEVICE — INFECTION CONTROL KIT SYS

## (undated) DEVICE — SURGICAL PROCEDURE KIT GEN LAPAROSCOPY LF

## (undated) DEVICE — ROCKER SWITCH PENCIL HOLSTER: Brand: VALLEYLAB

## (undated) DEVICE — SUTURE MCRYL SZ 4-0 L27IN ABSRB UD L19MM PS-2 1/2 CIR PRIM Y426H

## (undated) DEVICE — SYR 20ML LL STRL LF --

## (undated) DEVICE — KIT,1200CC CANISTER,3/16"X6' TUBING: Brand: MEDLINE INDUSTRIES, INC.

## (undated) DEVICE — SYRINGE IRRIG 60ML SFT PLIABLE BLB EZ TO GRP 1 HND USE W/

## (undated) DEVICE — HANDLE LT SNAP ON ULT DURABLE LENS FOR TRUMPF ALC DISPOSABLE

## (undated) DEVICE — DERMABOND SKIN ADH 0.7ML -- DERMABOND ADVANCED 12/BX

## (undated) DEVICE — TRAY,URINE METER,100% SILICONE,16FR10ML: Brand: MEDLINE

## (undated) DEVICE — SUTURE SZ 0 27IN 5/8 CIR UR-6  TAPER PT VIOLET ABSRB VICRYL J603H

## (undated) DEVICE — SOL IRR SOD CL 0.9% 3000ML --